# Patient Record
Sex: FEMALE | Race: BLACK OR AFRICAN AMERICAN | NOT HISPANIC OR LATINO | URBAN - METROPOLITAN AREA
[De-identification: names, ages, dates, MRNs, and addresses within clinical notes are randomized per-mention and may not be internally consistent; named-entity substitution may affect disease eponyms.]

---

## 2017-09-12 PROBLEM — Z00.00 ENCOUNTER FOR PREVENTIVE HEALTH EXAMINATION: Status: ACTIVE | Noted: 2017-09-12

## 2022-11-22 ENCOUNTER — EMERGENCY (EMERGENCY)
Facility: HOSPITAL | Age: 52
LOS: 1 days | Discharge: HOME | End: 2022-11-22
Attending: EMERGENCY MEDICINE | Admitting: EMERGENCY MEDICINE

## 2022-11-22 VITALS
RESPIRATION RATE: 18 BRPM | SYSTOLIC BLOOD PRESSURE: 176 MMHG | HEART RATE: 100 BPM | DIASTOLIC BLOOD PRESSURE: 93 MMHG | OXYGEN SATURATION: 99 % | TEMPERATURE: 99 F

## 2022-11-22 DIAGNOSIS — I10 ESSENTIAL (PRIMARY) HYPERTENSION: ICD-10-CM

## 2022-11-22 DIAGNOSIS — E78.49 OTHER HYPERLIPIDEMIA: ICD-10-CM

## 2022-11-22 DIAGNOSIS — K59.00 CONSTIPATION, UNSPECIFIED: ICD-10-CM

## 2022-11-22 DIAGNOSIS — E11.9 TYPE 2 DIABETES MELLITUS WITHOUT COMPLICATIONS: ICD-10-CM

## 2022-11-22 LAB
A1C WITH ESTIMATED AVERAGE GLUCOSE RESULT: 11.5 % — HIGH (ref 4–5.6)
ALBUMIN SERPL ELPH-MCNC: 4.4 G/DL — SIGNIFICANT CHANGE UP (ref 3.3–5)
ALP SERPL-CCNC: 132 U/L — HIGH (ref 40–120)
ALT FLD-CCNC: 16 U/L — SIGNIFICANT CHANGE UP (ref 4–33)
ANION GAP SERPL CALC-SCNC: 12 MMOL/L — SIGNIFICANT CHANGE UP (ref 7–14)
APPEARANCE UR: CLEAR — SIGNIFICANT CHANGE UP
AST SERPL-CCNC: 22 U/L — SIGNIFICANT CHANGE UP (ref 4–32)
BASE EXCESS BLDV CALC-SCNC: 1.5 MMOL/L — SIGNIFICANT CHANGE UP (ref -2–3)
BASOPHILS # BLD AUTO: 0.05 K/UL — SIGNIFICANT CHANGE UP (ref 0–0.2)
BASOPHILS NFR BLD AUTO: 0.7 % — SIGNIFICANT CHANGE UP (ref 0–2)
BILIRUB SERPL-MCNC: <0.2 MG/DL — SIGNIFICANT CHANGE UP (ref 0.2–1.2)
BILIRUB UR-MCNC: NEGATIVE — SIGNIFICANT CHANGE UP
BLOOD GAS VENOUS COMPREHENSIVE RESULT: SIGNIFICANT CHANGE UP
BUN SERPL-MCNC: 14 MG/DL — SIGNIFICANT CHANGE UP (ref 7–23)
CALCIUM SERPL-MCNC: 9.4 MG/DL — SIGNIFICANT CHANGE UP (ref 8.4–10.5)
CHLORIDE BLDV-SCNC: 97 MMOL/L — SIGNIFICANT CHANGE UP (ref 96–108)
CHLORIDE SERPL-SCNC: 95 MMOL/L — LOW (ref 98–107)
CO2 BLDV-SCNC: 29.2 MMOL/L — HIGH (ref 22–26)
CO2 SERPL-SCNC: 25 MMOL/L — SIGNIFICANT CHANGE UP (ref 22–31)
COLOR SPEC: SIGNIFICANT CHANGE UP
CREAT SERPL-MCNC: 0.72 MG/DL — SIGNIFICANT CHANGE UP (ref 0.5–1.3)
DIFF PNL FLD: NEGATIVE — SIGNIFICANT CHANGE UP
EGFR: 101 ML/MIN/1.73M2 — SIGNIFICANT CHANGE UP
EOSINOPHIL # BLD AUTO: 0.04 K/UL — SIGNIFICANT CHANGE UP (ref 0–0.5)
EOSINOPHIL NFR BLD AUTO: 0.6 % — SIGNIFICANT CHANGE UP (ref 0–6)
ESTIMATED AVERAGE GLUCOSE: 283 — SIGNIFICANT CHANGE UP
FLUAV AG NPH QL: SIGNIFICANT CHANGE UP
FLUBV AG NPH QL: SIGNIFICANT CHANGE UP
GAS PNL BLDV: 130 MMOL/L — LOW (ref 136–145)
GLUCOSE BLDV-MCNC: 213 MG/DL — HIGH (ref 70–99)
GLUCOSE SERPL-MCNC: 234 MG/DL — HIGH (ref 70–99)
GLUCOSE UR QL: ABNORMAL
HCO3 BLDV-SCNC: 28 MMOL/L — SIGNIFICANT CHANGE UP (ref 22–29)
HCT VFR BLD CALC: 38.2 % — SIGNIFICANT CHANGE UP (ref 34.5–45)
HCT VFR BLDA CALC: 38 % — SIGNIFICANT CHANGE UP (ref 34.5–46.5)
HGB BLD CALC-MCNC: 12.7 G/DL — SIGNIFICANT CHANGE UP (ref 11.5–15.5)
HGB BLD-MCNC: 12.4 G/DL — SIGNIFICANT CHANGE UP (ref 11.5–15.5)
IANC: 4.98 K/UL — SIGNIFICANT CHANGE UP (ref 1.8–7.4)
IMM GRANULOCYTES NFR BLD AUTO: 0.3 % — SIGNIFICANT CHANGE UP (ref 0–0.9)
KETONES UR-MCNC: NEGATIVE — SIGNIFICANT CHANGE UP
LACTATE BLDV-MCNC: 1 MMOL/L — SIGNIFICANT CHANGE UP (ref 0.5–2)
LEUKOCYTE ESTERASE UR-ACNC: NEGATIVE — SIGNIFICANT CHANGE UP
LYMPHOCYTES # BLD AUTO: 1.61 K/UL — SIGNIFICANT CHANGE UP (ref 1–3.3)
LYMPHOCYTES # BLD AUTO: 22.5 % — SIGNIFICANT CHANGE UP (ref 13–44)
MCHC RBC-ENTMCNC: 26.8 PG — LOW (ref 27–34)
MCHC RBC-ENTMCNC: 32.5 GM/DL — SIGNIFICANT CHANGE UP (ref 32–36)
MCV RBC AUTO: 82.5 FL — SIGNIFICANT CHANGE UP (ref 80–100)
MONOCYTES # BLD AUTO: 0.44 K/UL — SIGNIFICANT CHANGE UP (ref 0–0.9)
MONOCYTES NFR BLD AUTO: 6.2 % — SIGNIFICANT CHANGE UP (ref 2–14)
NEUTROPHILS # BLD AUTO: 4.98 K/UL — SIGNIFICANT CHANGE UP (ref 1.8–7.4)
NEUTROPHILS NFR BLD AUTO: 69.7 % — SIGNIFICANT CHANGE UP (ref 43–77)
NITRITE UR-MCNC: NEGATIVE — SIGNIFICANT CHANGE UP
NRBC # BLD: 0 /100 WBCS — SIGNIFICANT CHANGE UP (ref 0–0)
NRBC # FLD: 0 K/UL — SIGNIFICANT CHANGE UP (ref 0–0)
PCO2 BLDV: 49 MMHG — HIGH (ref 39–42)
PH BLDV: 7.36 — SIGNIFICANT CHANGE UP (ref 7.32–7.43)
PH UR: 7.5 — SIGNIFICANT CHANGE UP (ref 5–8)
PLATELET # BLD AUTO: 226 K/UL — SIGNIFICANT CHANGE UP (ref 150–400)
PO2 BLDV: 53 MMHG — SIGNIFICANT CHANGE UP
POTASSIUM BLDV-SCNC: 6.1 MMOL/L — HIGH (ref 3.5–5.1)
POTASSIUM SERPL-MCNC: 4.8 MMOL/L — SIGNIFICANT CHANGE UP (ref 3.5–5.3)
POTASSIUM SERPL-SCNC: 4.8 MMOL/L — SIGNIFICANT CHANGE UP (ref 3.5–5.3)
PROT SERPL-MCNC: 8 G/DL — SIGNIFICANT CHANGE UP (ref 6–8.3)
PROT UR-MCNC: NEGATIVE — SIGNIFICANT CHANGE UP
RBC # BLD: 4.63 M/UL — SIGNIFICANT CHANGE UP (ref 3.8–5.2)
RBC # FLD: 12.5 % — SIGNIFICANT CHANGE UP (ref 10.3–14.5)
RSV RNA NPH QL NAA+NON-PROBE: SIGNIFICANT CHANGE UP
SAO2 % BLDV: 84.5 % — SIGNIFICANT CHANGE UP
SARS-COV-2 RNA SPEC QL NAA+PROBE: SIGNIFICANT CHANGE UP
SODIUM SERPL-SCNC: 132 MMOL/L — LOW (ref 135–145)
SP GR SPEC: 1.01 — SIGNIFICANT CHANGE UP (ref 1.01–1.05)
UROBILINOGEN FLD QL: SIGNIFICANT CHANGE UP
WBC # BLD: 7.14 K/UL — SIGNIFICANT CHANGE UP (ref 3.8–10.5)
WBC # FLD AUTO: 7.14 K/UL — SIGNIFICANT CHANGE UP (ref 3.8–10.5)

## 2022-11-22 PROCEDURE — 71046 X-RAY EXAM CHEST 2 VIEWS: CPT | Mod: 26

## 2022-11-22 PROCEDURE — 99205 OFFICE O/P NEW HI 60 MIN: CPT

## 2022-11-22 PROCEDURE — 70450 CT HEAD/BRAIN W/O DYE: CPT | Mod: 26,MA

## 2022-11-22 PROCEDURE — 99220: CPT

## 2022-11-22 PROCEDURE — 93010 ELECTROCARDIOGRAM REPORT: CPT

## 2022-11-22 RX ORDER — SODIUM CHLORIDE 9 MG/ML
1000 INJECTION, SOLUTION INTRAVENOUS
Refills: 0 | Status: DISCONTINUED | OUTPATIENT
Start: 2022-11-22 | End: 2022-11-25

## 2022-11-22 RX ORDER — AMLODIPINE BESYLATE 2.5 MG/1
5 TABLET ORAL ONCE
Refills: 0 | Status: COMPLETED | OUTPATIENT
Start: 2022-11-22 | End: 2022-11-22

## 2022-11-22 RX ORDER — INSULIN LISPRO 100/ML
10 VIAL (ML) SUBCUTANEOUS
Refills: 0 | Status: DISCONTINUED | OUTPATIENT
Start: 2022-11-22 | End: 2022-11-22

## 2022-11-22 RX ORDER — GLUCAGON INJECTION, SOLUTION 0.5 MG/.1ML
1 INJECTION, SOLUTION SUBCUTANEOUS ONCE
Refills: 0 | Status: DISCONTINUED | OUTPATIENT
Start: 2022-11-22 | End: 2022-11-25

## 2022-11-22 RX ORDER — DEXTROSE 50 % IN WATER 50 %
15 SYRINGE (ML) INTRAVENOUS ONCE
Refills: 0 | Status: DISCONTINUED | OUTPATIENT
Start: 2022-11-22 | End: 2022-11-25

## 2022-11-22 RX ORDER — INSULIN GLARGINE 100 [IU]/ML
16 INJECTION, SOLUTION SUBCUTANEOUS
Refills: 0 | Status: DISCONTINUED | OUTPATIENT
Start: 2022-11-23 | End: 2022-11-23

## 2022-11-22 RX ORDER — DEXTROSE 50 % IN WATER 50 %
12.5 SYRINGE (ML) INTRAVENOUS ONCE
Refills: 0 | Status: DISCONTINUED | OUTPATIENT
Start: 2022-11-22 | End: 2022-11-25

## 2022-11-22 RX ORDER — INSULIN LISPRO 100/ML
VIAL (ML) SUBCUTANEOUS
Refills: 0 | Status: DISCONTINUED | OUTPATIENT
Start: 2022-11-22 | End: 2022-11-25

## 2022-11-22 RX ORDER — SODIUM CHLORIDE 9 MG/ML
1000 INJECTION, SOLUTION INTRAVENOUS ONCE
Refills: 0 | Status: COMPLETED | OUTPATIENT
Start: 2022-11-22 | End: 2022-11-22

## 2022-11-22 RX ORDER — DEXTROSE 50 % IN WATER 50 %
25 SYRINGE (ML) INTRAVENOUS ONCE
Refills: 0 | Status: DISCONTINUED | OUTPATIENT
Start: 2022-11-22 | End: 2022-11-25

## 2022-11-22 RX ORDER — INSULIN GLARGINE 100 [IU]/ML
16 INJECTION, SOLUTION SUBCUTANEOUS ONCE
Refills: 0 | Status: COMPLETED | OUTPATIENT
Start: 2022-11-22 | End: 2022-11-22

## 2022-11-22 RX ORDER — MECLIZINE HCL 12.5 MG
12.5 TABLET ORAL ONCE
Refills: 0 | Status: COMPLETED | OUTPATIENT
Start: 2022-11-22 | End: 2022-11-22

## 2022-11-22 RX ORDER — INSULIN LISPRO 100/ML
8 VIAL (ML) SUBCUTANEOUS
Refills: 0 | Status: DISCONTINUED | OUTPATIENT
Start: 2022-11-22 | End: 2022-11-23

## 2022-11-22 RX ADMIN — Medication 12.5 MILLIGRAM(S): at 10:09

## 2022-11-22 RX ADMIN — SODIUM CHLORIDE 1000 MILLILITER(S): 9 INJECTION, SOLUTION INTRAVENOUS at 10:09

## 2022-11-22 RX ADMIN — Medication 4: at 18:02

## 2022-11-22 RX ADMIN — Medication 8 UNIT(S): at 18:01

## 2022-11-22 RX ADMIN — AMLODIPINE BESYLATE 5 MILLIGRAM(S): 2.5 TABLET ORAL at 13:10

## 2022-11-22 RX ADMIN — INSULIN GLARGINE 16 UNIT(S): 100 INJECTION, SOLUTION SUBCUTANEOUS at 13:55

## 2022-11-22 NOTE — ED PROVIDER NOTE - ATTENDING CONTRIBUTION TO CARE
Pt was seen and evaluated by me. Pt is a 51 y/o female with PMHx of DM type 2 and HTN who presented to the ED for dizziness, cough, nausea, and vomiting X 1 day. Pt states having a cough with some sputum over the past 2 wks. Pt notes over the past day having some nausea with an episode of vomiting. Pt notes having dizziness, noted with position change. Pt states since having a headache. Pt denies any neck pain or back pain. Pt denies any fever, chills, SOB, chest pain, or abd pain. Pt admits to some constipation. Denies any dysuria.  VITALS: Vitals have been reviewed.  GEN APPEARANCE: Alert and cooperative, non-toxic appearing and in NAD  HEAD: Atraumatic, normocephalic.   EYES: PERRL, EOMI. No nystagmus.   EARS: Gross hearing intact. TMI b/l   NOSE: No nasal discharge.   THROAT: MMM. Oral cavity and pharynx normal.   CV: RRR, S1S2, no c/r/m/g. No cyanosis or pallor.   LUNGS: CTAB. No wheezing. No rales. No rhonchi. No diminished breath sounds.   ABDOMEN: Soft, NTND. No guarding or rebound.   MSK/EXT: Spine appears normal, no spine point tenderness.   NEURO: Alert, follows commands. Speech normal. Sensation and motor normal x4 extremities.   SKIN: Normal color for race, warm, dry and intact. No evidence of rash.  51 y/o female with PMHx of DM type 2 and HTN who presented to the ED for dizziness, cough, nausea, and vomiting X 1 day.  Concern for URI, Vertigo, Dehydration, ICB  Labs, EKG, CXR, CT

## 2022-11-22 NOTE — ED PROVIDER NOTE - OBJECTIVE STATEMENT
52-year-old with a past medical history of insulin-dependent diabetes senting with multiple chief complaints.  Of note, vital signs indicate a blood pressure of 176/93, heart rate of 100% afebrile in triage.  Patient has had 2 weeks of dry cough.  With positive sputum production.  Today at 5 AM, patient woke up and felt that the room was spinning when she mekhi to a seated position.  The patient sensation of dizziness went away promptly.  The sensation is usually not there when she is not moving her head it is worsened with head movement,  she has possibly been diagnosed with vertigo in the past but is unsure.  Associated associated is nausea and vomiting.  Headache started occurring today.  With gradual onset.  Is not on aspirin or Plavix.  Is in between PCPs.  Is not on any other medications.  Has recently also been unsteady on her feet.  She has not fallen nor lost consciousness.  No fevers at home. 52-year-old with a past medical history of insulin-dependent diabetes presenting with multiple chief complaints.  Of note, vital signs indicate a blood pressure of 176/93, pulse ox of 100% afebrile in triage.  Patient has had 2 weeks of dry cough.  With positive sputum production.  Today at 5 AM, patient woke up and felt that the room was spinning when she mekhi to a seated position.  The patient sensation of dizziness went away promptly.  The sensation is usually not there when she is not moving her head it is worsened with head movement,  she has possibly been diagnosed with vertigo in the past but is unsure.  Associated with nausea and vomiting.  Headache started occurring today.  With gradual onset.  Is not on aspirin or Plavix.  Is in between PCPs.  Is not on any other medications.  Has recently also been unsteady on her feet.  She has not fallen nor lost consciousness.  No fevers at home.

## 2022-11-22 NOTE — CONSULT NOTE ADULT - SUBJECTIVE AND OBJECTIVE BOX
HPI:    (Stroke only)  NIHSS:   MRS:   ICH:     REVIEW OF SYSTEMS  General:	  Skin/Breast:	  Ophthalmologic:  ENMT:	  Respiratory and Thorax:	  Cardiovascular:	  Gastrointestinal:	  Genitourinary:	  Musculoskeletal:	  Neurological:	  Psychiatric:	  Hematology/Lymphatics:	  Endocrine:	  Allergic/Immunologic:	    A 10-system ROS was performed and is negative except for those items noted above and/or in the HPI.    PAST MEDICAL & SURGICAL HISTORY:  DM2 (diabetes mellitus, type 2)      HTN (hypertension)      No significant past surgical history        FAMILY HISTORY:  No pertinent family history in first degree relatives      SOCIAL HISTORY:   T/E/D:   Occupation:   Lives with:     MEDICATIONS (HOME):  Home Medications:    MEDICATIONS  (STANDING):  dextrose 5%. 1000 milliLiter(s) (50 mL/Hr) IV Continuous <Continuous>  dextrose 5%. 1000 milliLiter(s) (100 mL/Hr) IV Continuous <Continuous>  dextrose 50% Injectable 25 Gram(s) IV Push once  dextrose 50% Injectable 12.5 Gram(s) IV Push once  dextrose 50% Injectable 25 Gram(s) IV Push once  glucagon  Injectable 1 milliGRAM(s) IntraMuscular once  insulin lispro (ADMELOG) corrective regimen sliding scale   SubCutaneous three times a day before meals  insulin lispro Injectable (ADMELOG) 8 Unit(s) SubCutaneous three times a day before meals    MEDICATIONS  (PRN):  dextrose Oral Gel 15 Gram(s) Oral once PRN Blood Glucose LESS THAN 70 milliGRAM(s)/deciliter    ALLERGIES/INTOLERANCES:  Allergies  No Known Allergies    Intolerances    VITALS & EXAMINATION:  Vital Signs Last 24 Hrs  T(C): 36.8 (2022 17:20), Max: 37.2 (2022 08:35)  T(F): 98.3 (2022 17:20), Max: 98.9 (2022 08:35)  HR: 102 (2022 17:20) (87 - 102)  BP: 122/71 (2022 17:20) (122/71 - 176/93)  BP(mean): --  RR: 17 (2022 17:20) (17 - 18)  SpO2: 99% (2022 17:20) (98% - 100%)    Parameters below as of 2022 17:20  Patient On (Oxygen Delivery Method): room air        General:  Constitutional: Obese Female, appears stated age, in no apparent distress including pain  Head: Normocephalic & atraumatic.  ENT: Patent ear canals, intact TM, mucus membranes moist & pink, neck supple, no lymphadenopathy.   Respiratory: Patent airway. All lung fields are clear to auscultation bilaterally.  Extremities: No cyanosis, clubbing, or edema.  Skin: No rashes, bruising, or discoloration.    Cardiovascular (>2): RRR no murmurs. Carotid pulsations symmetric, no bruits. Normal capillary beds refill, 1-2 seconds or less.     Neurological (>12):  MS: Awake, alert, oriented to person, place, situation, time. Normal affect. Follows all commands.    Language: Speech is clear, fluent with good repetition & comprehension (able to name objects___)    CNs: PERRLA (R = 3mm, L = 3mm). VFF. EOMI no nystagmus, no diplopia. V1-3 intact to LT/pinprick, well developed masseter muscles b/l. No facial asymmetry b/l, full eye closure strength b/l. Hearing grossly normal (rubbing fingers) b/l. Symmetric palate elevation in midline. Gag reflex deferred. Head turning & shoulder shrug intact b/l. Tongue midline, normal movements, no atrophy.    Fundoscopic: pale w/ sharp discs margins No vascular changes.      Motor: Normal muscle bulk & tone. No noticeable tremor or seizure. No pronator drift.              Deltoid	Biceps	Triceps	Wrist	Finger ABd	   R	5	5	5	5	5		5 	  L	5	5	5	5	5		5    	H-Flex	H-Ext	H-ABd	H-ADd	K-Flex	K-Ext	D-Flex	P-Flex  R	5	5	5	5	5	5	5	5 	   L	5	5	5	5	5	5	5	5	     Sensation: Intact to LT/PP/Temp/Vibration/Position b/l throughout.     Cortical: Extinction on DSS (neglect): none    Reflexes:              Biceps(C5)       BR(C6)     Triceps(C7)               Patellar(L4)    Achilles(S1)    Plantar Resp  R	2	          2	             2		        2		    2		Down   L	2	          2	             2		        2		    2		Down     Coordination: intact rapid-alt movements. No dysmetria to FTN/HTS    Gait: Normal Romberg. No postural instability. Normal stance and tandem gait.     LABORATORY:  CBC                       12.4   7.14  )-----------( 226      ( 2022 10:40 )             38.2     Chem 11-22    132<L>  |  95<L>  |  14  ----------------------------<  234<H>  4.8   |  25  |  0.72    Ca    9.4      2022 10:40    TPro  8.0  /  Alb  4.4  /  TBili  <0.2  /  DBili  x   /  AST  22  /  ALT  16  /  AlkPhos  132<H>  11-22    LFTs LIVER FUNCTIONS - ( 2022 10:40 )  Alb: 4.4 g/dL / Pro: 8.0 g/dL / ALK PHOS: 132 U/L / ALT: 16 U/L / AST: 22 U/L / GGT: x           Coagulopathy   Lipid Panel   A1c   Cardiac enzymes     U/A Urinalysis Basic - ( 2022 12:30 )    Color: Light Yellow / Appearance: Clear / S.010 / pH: x  Gluc: x / Ketone: Negative  / Bili: Negative / Urobili: <2 mg/dL   Blood: x / Protein: Negative / Nitrite: Negative   Leuk Esterase: Negative / RBC: x / WBC x   Sq Epi: x / Non Sq Epi: x / Bacteria: x      CSF  Immunological  Other    STUDIES & IMAGING:  Studies (EKG, EEG, EMG, etc):     Radiology (XR, CT, MR, U/S, TTE/KRIS):    CT head w/o: 1. Subtle decreased attenuation right external capsular region, may   represent an age-indeterminate ischemic event (axial 2-21 and coronal   4-36).  2. Ill-defined density involving the posterior right lobe, which may   represent retinal hemorrhage versus a tumor (as might be seen with   melanoma).  3. Air-fluid level involving the right frontal sinus with opacification   of anteriorly located right ethmoid air cells. Correlate clinically for   acute sinusitis.     HPI:  53 y/o female with PMHx of DM type 2 and HTN who presented to the ED for dizziness, cough, nausea, and vomiting X 1 day. Found to have a BS in the 200's in the ED with a HGA1C of 11 noncompliant with insulin regimen at home. Sent to CDU for endo consult and further education for DM.     Neurology consulted for CT head imaging showing 'decreased attenuation right external capsular region, may represent an age-indeterminate ischemic event.' Also showed concern for globe vitreous hemm vs mass. Denies...    (Stroke only)  NIHSS: 0   MRS: 0      REVIEW OF SYSTEMS  Per HPI    PAST MEDICAL & SURGICAL HISTORY:  DM2 (diabetes mellitus, type 2)      HTN (hypertension)      No significant past surgical history        FAMILY HISTORY:  No pertinent family history in first degree relatives      SOCIAL HISTORY:   T/E/D:   Occupation:   Lives with:     MEDICATIONS (HOME):  Home Medications:    MEDICATIONS  (STANDING):  dextrose 5%. 1000 milliLiter(s) (50 mL/Hr) IV Continuous <Continuous>  dextrose 5%. 1000 milliLiter(s) (100 mL/Hr) IV Continuous <Continuous>  dextrose 50% Injectable 25 Gram(s) IV Push once  dextrose 50% Injectable 12.5 Gram(s) IV Push once  dextrose 50% Injectable 25 Gram(s) IV Push once  glucagon  Injectable 1 milliGRAM(s) IntraMuscular once  insulin lispro (ADMELOG) corrective regimen sliding scale   SubCutaneous three times a day before meals  insulin lispro Injectable (ADMELOG) 8 Unit(s) SubCutaneous three times a day before meals    MEDICATIONS  (PRN):  dextrose Oral Gel 15 Gram(s) Oral once PRN Blood Glucose LESS THAN 70 milliGRAM(s)/deciliter    ALLERGIES/INTOLERANCES:  Allergies  No Known Allergies    Intolerances    VITALS & EXAMINATION:  Vital Signs Last 24 Hrs  T(C): 36.8 (2022 17:20), Max: 37.2 (2022 08:35)  T(F): 98.3 (2022 17:20), Max: 98.9 (2022 08:35)  HR: 102 (2022 17:20) (87 - 102)  BP: 122/71 (2022 17:20) (122/71 - 176/93)  BP(mean): --  RR: 17 (2022 17:20) (17 - 18)  SpO2: 99% (2022 17:20) (98% - 100%)    Parameters below as of 2022 17:20  Patient On (Oxygen Delivery Method): room air        General:  INCOMPLETE (TO BE UPDATED)  Constitutional: Female, appears stated age, in no apparent distress including pain  Head: Normocephalic & atraumatic.    Neurological (>12):  MS: Awake, alert, oriented to person, place, situation, time. Normal affect. Follows all commands.    Language: Speech is clear, fluent with good repetition & comprehension (able to name objects___)    CNs: PERRLA (R = 3mm, L = 3mm). VFF. EOMI no nystagmus, no diplopia. V1-3 intact to LT/pinprick, well developed masseter muscles b/l. No facial asymmetry b/l, full eye closure strength b/l. Hearing grossly normal (rubbing fingers) b/l. Symmetric palate elevation in midline. Gag reflex deferred. Head turning & shoulder shrug intact b/l. Tongue midline, normal movements, no atrophy.    Motor: Normal muscle bulk & tone. No noticeable tremor or seizure. No pronator drift.    Sensation: Intact to LT/PP/Temp/Vibration/Position b/l throughout.     Cortical: Extinction on DSS (neglect): none    Coordination: intact rapid-alt movements. No dysmetria to FTN/HTS    Gait: Normal Romberg. No postural instability. Normal stance and tandem gait.     LABORATORY:  CBC                       12.4   7.14  )-----------( 226      ( 2022 10:40 )             38.2     Chem 11-22    132<L>  |  95<L>  |  14  ----------------------------<  234<H>  4.8   |  25  |  0.72    Ca    9.4      2022 10:40    TPro  8.0  /  Alb  4.4  /  TBili  <0.2  /  DBili  x   /  AST  22  /  ALT  16  /  AlkPhos  132<H>  11-22    LFTs LIVER FUNCTIONS - ( 2022 10:40 )  Alb: 4.4 g/dL / Pro: 8.0 g/dL / ALK PHOS: 132 U/L / ALT: 16 U/L / AST: 22 U/L / GGT: x           Coagulopathy   Lipid Panel   A1c   Cardiac enzymes     U/A Urinalysis Basic - ( 2022 12:30 )    Color: Light Yellow / Appearance: Clear / S.010 / pH: x  Gluc: x / Ketone: Negative  / Bili: Negative / Urobili: <2 mg/dL   Blood: x / Protein: Negative / Nitrite: Negative   Leuk Esterase: Negative / RBC: x / WBC x   Sq Epi: x / Non Sq Epi: x / Bacteria: x      CSF  Immunological  Other    STUDIES & IMAGING:  Studies (EKG, EEG, EMG, etc):     Radiology (XR, CT, MR, U/S, TTE/KRIS):    CT head w/o: 1. Subtle decreased attenuation right external capsular region, may   represent an age-indeterminate ischemic event (axial 2-21 and coronal   4-36).  2. Ill-defined density involving the posterior right lobe, which may   represent retinal hemorrhage versus a tumor (as might be seen with   melanoma).  3. Air-fluid level involving the right frontal sinus with opacification   of anteriorly located right ethmoid air cells. Correlate clinically for   acute sinusitis.     HPI:  51 y/o female with PMHx of DM type 2 and HTN who presented to the ED for dizziness, cough, nausea, and vomiting X 1 day. Found to have a BS in the 200's in the ED with a HGA1C of 11 noncompliant with insulin regimen at home. Sent to CDU for endo consult and further education for DM.     Neurology consulted for CT head imaging showing 'decreased attenuation right external capsular region, may represent an age-indeterminate ischemic event.' Also showed concern for R globe vitreous hemm vs mass. Denies blurry vision, diplopia, speech changes, LOC, neck pain, focal numbness/tingling/paresis. Patient on and off takes ASA 81mg. No hx of infarcts in the past.     (Stroke only)  NIHSS: 0   MRS: 0      REVIEW OF SYSTEMS  Per HPI    PAST MEDICAL & SURGICAL HISTORY:  DM2 (diabetes mellitus, type 2)      HTN (hypertension)      No significant past surgical history        FAMILY HISTORY:  No pertinent family history in first degree relatives      SOCIAL HISTORY:   T/E/D:   Occupation:   Lives with:     MEDICATIONS (HOME):  Home Medications:    MEDICATIONS  (STANDING):  dextrose 5%. 1000 milliLiter(s) (50 mL/Hr) IV Continuous <Continuous>  dextrose 5%. 1000 milliLiter(s) (100 mL/Hr) IV Continuous <Continuous>  dextrose 50% Injectable 25 Gram(s) IV Push once  dextrose 50% Injectable 12.5 Gram(s) IV Push once  dextrose 50% Injectable 25 Gram(s) IV Push once  glucagon  Injectable 1 milliGRAM(s) IntraMuscular once  insulin lispro (ADMELOG) corrective regimen sliding scale   SubCutaneous three times a day before meals  insulin lispro Injectable (ADMELOG) 8 Unit(s) SubCutaneous three times a day before meals    MEDICATIONS  (PRN):  dextrose Oral Gel 15 Gram(s) Oral once PRN Blood Glucose LESS THAN 70 milliGRAM(s)/deciliter    ALLERGIES/INTOLERANCES:  Allergies  No Known Allergies    Intolerances    VITALS & EXAMINATION:  Vital Signs Last 24 Hrs  T(C): 36.8 (2022 17:20), Max: 37.2 (2022 08:35)  T(F): 98.3 (2022 17:20), Max: 98.9 (2022 08:35)  HR: 102 (2022 17:20) (87 - 102)  BP: 122/71 (2022 17:20) (122/71 - 176/93)  BP(mean): --  RR: 17 (2022 17:20) (17 - 18)  SpO2: 99% (2022 17:20) (98% - 100%)    Parameters below as of 2022 17:20  Patient On (Oxygen Delivery Method): room air        General:  INCOMPLETE (TO BE UPDATED)  Constitutional: Female, appears stated age, in no apparent distress including pain  Head: Normocephalic & atraumatic.    Neurological (>12):  MS: Awake, alert, oriented to person, place, situation, time. Normal affect. Follows all commands.    Language: Speech is clear, fluent with good repetition & comprehension (able to name objects___)    CNs: PERRLA (R = 3mm, L = 3mm). VFF. EOMI no nystagmus, no diplopia. V1-3 intact to LT/pinprick, well developed masseter muscles b/l. No facial asymmetry b/l, full eye closure strength b/l. Hearing grossly normal (rubbing fingers) b/l. Symmetric palate elevation in midline. Gag reflex deferred. Head turning & shoulder shrug intact b/l. Tongue midline, normal movements, no atrophy.    Motor: Normal muscle bulk & tone. No noticeable tremor or seizure. No pronator drift.    Sensation: Intact to LT/PP/Temp/Vibration/Position b/l throughout.     Cortical: Extinction on DSS (neglect): none    Coordination: intact rapid-alt movements. No dysmetria to FTN/HTS    Gait: Normal Romberg. No postural instability. Normal stance and tandem gait.     LABORATORY:  CBC                       12.4   7.14  )-----------( 226      ( 2022 10:40 )             38.2     Chem 11-22    132<L>  |  95<L>  |  14  ----------------------------<  234<H>  4.8   |  25  |  0.72    Ca    9.4      2022 10:40    TPro  8.0  /  Alb  4.4  /  TBili  <0.2  /  DBili  x   /  AST  22  /  ALT  16  /  AlkPhos  132<H>  11-22    LFTs LIVER FUNCTIONS - ( 2022 10:40 )  Alb: 4.4 g/dL / Pro: 8.0 g/dL / ALK PHOS: 132 U/L / ALT: 16 U/L / AST: 22 U/L / GGT: x           Coagulopathy   Lipid Panel   A1c   Cardiac enzymes     U/A Urinalysis Basic - ( 2022 12:30 )    Color: Light Yellow / Appearance: Clear / S.010 / pH: x  Gluc: x / Ketone: Negative  / Bili: Negative / Urobili: <2 mg/dL   Blood: x / Protein: Negative / Nitrite: Negative   Leuk Esterase: Negative / RBC: x / WBC x   Sq Epi: x / Non Sq Epi: x / Bacteria: x      CSF  Immunological  Other    STUDIES & IMAGING:  Studies (EKG, EEG, EMG, etc):     Radiology (XR, CT, MR, U/S, TTE/KRIS):    CT head w/o: 1. Subtle decreased attenuation right external capsular region, may   represent an age-indeterminate ischemic event (axial 2-21 and coronal   4-36).  2. Ill-defined density involving the posterior right lobe, which may   represent retinal hemorrhage versus a tumor (as might be seen with   melanoma).  3. Air-fluid level involving the right frontal sinus with opacification   of anteriorly located right ethmoid air cells. Correlate clinically for   acute sinusitis.     HPI:  51 y/o female with PMHx of DM type 2 and HTN who presented to the ED for dizziness, cough, nausea, and vomiting X 1 day. Found to have a BS in the 200's in the ED with a HGA1C of 11 noncompliant with insulin regimen at home. Sent to [facility]U for endo consult and further education for DM.     Neurology consulted for CT head imaging showing 'decreased attenuation right external capsular region, may represent an age-indeterminate ischemic event.' Also showed concern for R globe vitreous hemm vs mass. Denies blurry vision, diplopia, speech changes, LOC, neck pain, focal numbness/tingling/paresis. Patient on and off takes ASA 81mg. No hx of infarcts in the past.     (Stroke only)  NIHSS: 0   MRS: 0      REVIEW OF SYSTEMS  Per HPI    PAST MEDICAL & SURGICAL HISTORY:  DM2 (diabetes mellitus, type 2)      HTN (hypertension)      No significant past surgical history        FAMILY HISTORY:  No pertinent family history in first degree relatives      SOCIAL HISTORY:   T/E/D:   Occupation:   Lives with:     MEDICATIONS (HOME):  Home Medications:    MEDICATIONS  (STANDING):  dextrose 5%. 1000 milliLiter(s) (50 mL/Hr) IV Continuous <Continuous>  dextrose 5%. 1000 milliLiter(s) (100 mL/Hr) IV Continuous <Continuous>  dextrose 50% Injectable 25 Gram(s) IV Push once  dextrose 50% Injectable 12.5 Gram(s) IV Push once  dextrose 50% Injectable 25 Gram(s) IV Push once  glucagon  Injectable 1 milliGRAM(s) IntraMuscular once  insulin lispro (ADMELOG) corrective regimen sliding scale   SubCutaneous three times a day before meals  insulin lispro Injectable (ADMELOG) 8 Unit(s) SubCutaneous three times a day before meals    MEDICATIONS  (PRN):  dextrose Oral Gel 15 Gram(s) Oral once PRN Blood Glucose LESS THAN 70 milliGRAM(s)/deciliter    ALLERGIES/INTOLERANCES:  Allergies  No Known Allergies    Intolerances    VITALS & EXAMINATION:  Vital Signs Last 24 Hrs  T(C): 36.8 (2022 17:20), Max: 37.2 (2022 08:35)  T(F): 98.3 (2022 17:20), Max: 98.9 (2022 08:35)  HR: 102 (2022 17:20) (87 - 102)  BP: 122/71 (2022 17:20) (122/71 - 176/93)  BP(mean): --  RR: 17 (2022 17:20) (17 - 18)  SpO2: 99% (2022 17:20) (98% - 100%)    Parameters below as of 2022 17:20  Patient On (Oxygen Delivery Method): room air        General:  Constitutional: Female, appears stated age, in no apparent distress including pain  Head: Normocephalic & atraumatic.    Neurological (>12):  MS: Awake, alert, oriented to person, place, situation, time. Normal affect. Follows all commands.    Language: Speech is clear, fluent with good repetition & comprehension    CNs: PERRLA (R = 3mm, L = 3mm). VFF. EOMI no nystagmus V1-3 intact to LT. No facial asymmetry b/l, full eye closure strength b/l.Gag reflex deferred. Head turning & shoulder shrug intact b/l. Tongue midline, normal movements, no atrophy.    Motor: Normal muscle bulk & tone. No noticeable tremor or seizure. No pronator drift. No drift in LE's.     Sensation: Intact to LT b/l throughout.     Cortical: Extinction on DSS (neglect): none    Coordination: intact rapid-alt movements. No dysmetria to FTN/HTS    Gait: Deferred    LABORATORY:  CBC                       12.4   7.14  )-----------( 226      ( 2022 10:40 )             38.2     Chem 11-22    132<L>  |  95<L>  |  14  ----------------------------<  234<H>  4.8   |  25  |  0.72    Ca    9.4      2022 10:40    TPro  8.0  /  Alb  4.4  /  TBili  <0.2  /  DBili  x   /  AST  22  /  ALT  16  /  AlkPhos  132<H>  11-22    LFTs LIVER FUNCTIONS - ( 2022 10:40 )  Alb: 4.4 g/dL / Pro: 8.0 g/dL / ALK PHOS: 132 U/L / ALT: 16 U/L / AST: 22 U/L / GGT: x           Coagulopathy   Lipid Panel   A1c   Cardiac enzymes     U/A Urinalysis Basic - ( 2022 12:30 )    Color: Light Yellow / Appearance: Clear / S.010 / pH: x  Gluc: x / Ketone: Negative  / Bili: Negative / Urobili: <2 mg/dL   Blood: x / Protein: Negative / Nitrite: Negative   Leuk Esterase: Negative / RBC: x / WBC x   Sq Epi: x / Non Sq Epi: x / Bacteria: x      CSF  Immunological  Other    STUDIES & IMAGING:  Studies (EKG, EEG, EMG, etc):     Radiology (XR, CT, MR, U/S, TTE/KRIS):    CT head w/o: 1. Subtle decreased attenuation right external capsular region, may   represent an age-indeterminate ischemic event (axial 2-21 and coronal   4-36).  2. Ill-defined density involving the posterior right lobe, which may   represent retinal hemorrhage versus a tumor (as might be seen with   melanoma).  3. Air-fluid level involving the right frontal sinus with opacification   of anteriorly located right ethmoid air cells. Correlate clinically for   acute sinusitis.     HPI:  51 y/o female with PMHx of DM type 2 and HTN who presented to the ED for dizziness, cough, nausea, and vomiting X 1 day. Found to have a BS in the 200's in the ED with a HGA1C of 11 noncompliant with insulin regimen at home. Sent to CDU for endo consult and further education for DM.     Neurology consulted for CT head imaging showing 'decreased attenuation right external capsular region, may represent an age-indeterminate ischemic event.' Also showed concern for R globe vitreous hemm vs mass. Denies blurry vision, diplopia, speech changes, LOC, neck pain, focal numbness/tingling/paresis. Patient on and off takes ASA 81mg. No hx of infarcts in the past.     (Stroke only)  NIHSS: 0   MRS: 0      REVIEW OF SYSTEMS  Per HPI    PAST MEDICAL & SURGICAL HISTORY:  DM2 (diabetes mellitus, type 2)      HTN (hypertension)      No significant past surgical history        FAMILY HISTORY:  No pertinent family history in first degree relatives      SOCIAL HISTORY:   T/E/D:   Occupation:   Lives with:     MEDICATIONS (HOME):  Home Medications:    MEDICATIONS  (STANDING):  dextrose 5%. 1000 milliLiter(s) (50 mL/Hr) IV Continuous <Continuous>  dextrose 5%. 1000 milliLiter(s) (100 mL/Hr) IV Continuous <Continuous>  dextrose 50% Injectable 25 Gram(s) IV Push once  dextrose 50% Injectable 12.5 Gram(s) IV Push once  dextrose 50% Injectable 25 Gram(s) IV Push once  glucagon  Injectable 1 milliGRAM(s) IntraMuscular once  insulin lispro (ADMELOG) corrective regimen sliding scale   SubCutaneous three times a day before meals  insulin lispro Injectable (ADMELOG) 8 Unit(s) SubCutaneous three times a day before meals    MEDICATIONS  (PRN):  dextrose Oral Gel 15 Gram(s) Oral once PRN Blood Glucose LESS THAN 70 milliGRAM(s)/deciliter    ALLERGIES/INTOLERANCES:  Allergies  No Known Allergies    Intolerances    VITALS & EXAMINATION:  Vital Signs Last 24 Hrs  T(C): 36.8 (2022 17:20), Max: 37.2 (2022 08:35)  T(F): 98.3 (2022 17:20), Max: 98.9 (2022 08:35)  HR: 102 (2022 17:20) (87 - 102)  BP: 122/71 (2022 17:20) (122/71 - 176/93)  BP(mean): --  RR: 17 (2022 17:20) (17 - 18)  SpO2: 99% (2022 17:20) (98% - 100%)    Parameters below as of 2022 17:20  Patient On (Oxygen Delivery Method): room air        General:  Constitutional: Female, appears stated age, in no apparent distress including pain  Head: Normocephalic & atraumatic.    Neurological (>12):  MS: Awake, alert, oriented to person, place, situation, time. Normal affect. Follows all commands.    Language: Speech is clear, fluent with good repetition & comprehension    CNs: PERRLA (R = 3mm, L = 3mm). EOMI no nystagmus V1-3 intact to LT. No facial asymmetry b/l, full eye closure strength b/l.Gag reflex deferred. Head turning & shoulder shrug intact b/l. Tongue midline, normal movements, no atrophy.    Motor: Normal muscle bulk & tone. No noticeable tremor or seizure. No pronator drift. No drift in LE's.     Sensation: Intact to LT b/l throughout.     Cortical: Extinction on DSS (neglect): none    Coordination: intact rapid-alt movements. No dysmetria to FTN/HTS    Gait: Deferred    LABORATORY:  CBC                       12.4   7.14  )-----------( 226      ( 2022 10:40 )             38.2     Chem 11-22    132<L>  |  95<L>  |  14  ----------------------------<  234<H>  4.8   |  25  |  0.72    Ca    9.4      2022 10:40    TPro  8.0  /  Alb  4.4  /  TBili  <0.2  /  DBili  x   /  AST  22  /  ALT  16  /  AlkPhos  132<H>  11-22    LFTs LIVER FUNCTIONS - ( 2022 10:40 )  Alb: 4.4 g/dL / Pro: 8.0 g/dL / ALK PHOS: 132 U/L / ALT: 16 U/L / AST: 22 U/L / GGT: x           Coagulopathy   Lipid Panel   A1c   Cardiac enzymes     U/A Urinalysis Basic - ( 2022 12:30 )    Color: Light Yellow / Appearance: Clear / S.010 / pH: x  Gluc: x / Ketone: Negative  / Bili: Negative / Urobili: <2 mg/dL   Blood: x / Protein: Negative / Nitrite: Negative   Leuk Esterase: Negative / RBC: x / WBC x   Sq Epi: x / Non Sq Epi: x / Bacteria: x      CSF  Immunological  Other    STUDIES & IMAGING:  Studies (EKG, EEG, EMG, etc):     Radiology (XR, CT, MR, U/S, TTE/KRIS):    CT head w/o: 1. Subtle decreased attenuation right external capsular region, may   represent an age-indeterminate ischemic event (axial 2-21 and coronal   4-36).  2. Ill-defined density involving the posterior right lobe, which may   represent retinal hemorrhage versus a tumor (as might be seen with   melanoma).  3. Air-fluid level involving the right frontal sinus with opacification   of anteriorly located right ethmoid air cells. Correlate clinically for   acute sinusitis.     HPI:  51 y/o female with PMHx of DM type 2 and HTN who presented to the ED for dizziness, cough, nausea, and vomiting X 1 day. Found to have a BS in the 200's in the ED with a HGA1C of 11 noncompliant with insulin regimen at home. Sent to CDU for endo consult and further education for DM.     Neurology consulted for CT head imaging showing 'decreased attenuation right external capsular region, may represent an age-indeterminate ischemic event.' Also showed concern for R globe vitreous hemm vs mass. Denies diplopia, speech changes, LOC, neck pain, focal numbness/tingling/paresis. Patient on and off takes ASA 81mg. No hx of infarcts in the past.     (Stroke only)  NIHSS: 0   MRS: 0      REVIEW OF SYSTEMS  Per HPI    PAST MEDICAL & SURGICAL HISTORY:  DM2 (diabetes mellitus, type 2)      HTN (hypertension)      No significant past surgical history        FAMILY HISTORY:  No pertinent family history in first degree relatives      SOCIAL HISTORY:   T/E/D:   Occupation:   Lives with:     MEDICATIONS (HOME):  Home Medications:    MEDICATIONS  (STANDING):  dextrose 5%. 1000 milliLiter(s) (50 mL/Hr) IV Continuous <Continuous>  dextrose 5%. 1000 milliLiter(s) (100 mL/Hr) IV Continuous <Continuous>  dextrose 50% Injectable 25 Gram(s) IV Push once  dextrose 50% Injectable 12.5 Gram(s) IV Push once  dextrose 50% Injectable 25 Gram(s) IV Push once  glucagon  Injectable 1 milliGRAM(s) IntraMuscular once  insulin lispro (ADMELOG) corrective regimen sliding scale   SubCutaneous three times a day before meals  insulin lispro Injectable (ADMELOG) 8 Unit(s) SubCutaneous three times a day before meals    MEDICATIONS  (PRN):  dextrose Oral Gel 15 Gram(s) Oral once PRN Blood Glucose LESS THAN 70 milliGRAM(s)/deciliter    ALLERGIES/INTOLERANCES:  Allergies  No Known Allergies    Intolerances    VITALS & EXAMINATION:  Vital Signs Last 24 Hrs  T(C): 36.8 (2022 17:20), Max: 37.2 (2022 08:35)  T(F): 98.3 (2022 17:20), Max: 98.9 (2022 08:35)  HR: 102 (2022 17:20) (87 - 102)  BP: 122/71 (2022 17:20) (122/71 - 176/93)  BP(mean): --  RR: 17 (2022 17:20) (17 - 18)  SpO2: 99% (2022 17:20) (98% - 100%)    Parameters below as of 2022 17:20  Patient On (Oxygen Delivery Method): room air        General:  Constitutional: Female, appears stated age, in no apparent distress including pain  Head: Normocephalic & atraumatic.    Neurological (>12):  MS: Awake, alert, oriented to person, place, situation, time. Normal affect. Follows all commands.    Language: Speech is clear, fluent with good repetition & comprehension    CNs: PERRLA (R = 3mm, L = 3mm). EOMI no nystagmus V1-3 intact to LT. No facial asymmetry b/l, full eye closure strength b/l.Gag reflex deferred. Head turning & shoulder shrug intact b/l. Tongue midline, normal movements, no atrophy.    Motor: Normal muscle bulk & tone. No noticeable tremor or seizure. No pronator drift. No drift in LE's.     Sensation: Intact to LT b/l throughout.     Cortical: Extinction on DSS (neglect): none    Coordination: intact rapid-alt movements. No dysmetria to FTN/HTS    Gait: Deferred    LABORATORY:  CBC                       12.4   7.14  )-----------( 226      ( 2022 10:40 )             38.2     Chem 11-22    132<L>  |  95<L>  |  14  ----------------------------<  234<H>  4.8   |  25  |  0.72    Ca    9.4      2022 10:40    TPro  8.0  /  Alb  4.4  /  TBili  <0.2  /  DBili  x   /  AST  22  /  ALT  16  /  AlkPhos  132<H>  11-22    LFTs LIVER FUNCTIONS - ( 2022 10:40 )  Alb: 4.4 g/dL / Pro: 8.0 g/dL / ALK PHOS: 132 U/L / ALT: 16 U/L / AST: 22 U/L / GGT: x           Coagulopathy   Lipid Panel   A1c   Cardiac enzymes     U/A Urinalysis Basic - ( 2022 12:30 )    Color: Light Yellow / Appearance: Clear / S.010 / pH: x  Gluc: x / Ketone: Negative  / Bili: Negative / Urobili: <2 mg/dL   Blood: x / Protein: Negative / Nitrite: Negative   Leuk Esterase: Negative / RBC: x / WBC x   Sq Epi: x / Non Sq Epi: x / Bacteria: x      CSF  Immunological  Other    STUDIES & IMAGING:  Studies (EKG, EEG, EMG, etc):     Radiology (XR, CT, MR, U/S, TTE/KRIS):    CT head w/o: 1. Subtle decreased attenuation right external capsular region, may   represent an age-indeterminate ischemic event (axial 2-21 and coronal   4-36).  2. Ill-defined density involving the posterior right lobe, which may   represent retinal hemorrhage versus a tumor (as might be seen with   melanoma).  3. Air-fluid level involving the right frontal sinus with opacification   of anteriorly located right ethmoid air cells. Correlate clinically for   acute sinusitis.     HPI:  53 y/o female with PMHx of DM type 2 and HTN who presented to the ED for dizziness, cough, nausea, and vomiting X 1 day. Found to have a BS in the 200's in the ED with a HGA1C of 11 noncompliant with insulin regimen at home. Sent to CDU for endo consult and further education for DM.     Neurology consulted for CT head imaging showing 'decreased attenuation right external capsular region, may represent an age-indeterminate ischemic event.' Also showed concern for R globe vitreous hemm vs mass. Denies diplopia, speech changes, LOC, neck pain, focal numbness/tingling/paresis. Patient on and off takes ASA 81mg. No hx of infarcts in the past.     (Stroke only)  NIHSS: 1   MRS: 0      REVIEW OF SYSTEMS  Per HPI    PAST MEDICAL & SURGICAL HISTORY:  DM2 (diabetes mellitus, type 2)      HTN (hypertension)      No significant past surgical history        FAMILY HISTORY:  No pertinent family history in first degree relatives      SOCIAL HISTORY:   T/E/D:   Occupation:   Lives with:     MEDICATIONS (HOME):  Home Medications:    MEDICATIONS  (STANDING):  dextrose 5%. 1000 milliLiter(s) (50 mL/Hr) IV Continuous <Continuous>  dextrose 5%. 1000 milliLiter(s) (100 mL/Hr) IV Continuous <Continuous>  dextrose 50% Injectable 25 Gram(s) IV Push once  dextrose 50% Injectable 12.5 Gram(s) IV Push once  dextrose 50% Injectable 25 Gram(s) IV Push once  glucagon  Injectable 1 milliGRAM(s) IntraMuscular once  insulin lispro (ADMELOG) corrective regimen sliding scale   SubCutaneous three times a day before meals  insulin lispro Injectable (ADMELOG) 8 Unit(s) SubCutaneous three times a day before meals    MEDICATIONS  (PRN):  dextrose Oral Gel 15 Gram(s) Oral once PRN Blood Glucose LESS THAN 70 milliGRAM(s)/deciliter    ALLERGIES/INTOLERANCES:  Allergies  No Known Allergies    Intolerances    VITALS & EXAMINATION:  Vital Signs Last 24 Hrs  T(C): 36.8 (2022 17:20), Max: 37.2 (2022 08:35)  T(F): 98.3 (2022 17:20), Max: 98.9 (2022 08:35)  HR: 102 (2022 17:20) (87 - 102)  BP: 122/71 (2022 17:20) (122/71 - 176/93)  BP(mean): --  RR: 17 (2022 17:20) (17 - 18)  SpO2: 99% (2022 17:20) (98% - 100%)    Parameters below as of 2022 17:20  Patient On (Oxygen Delivery Method): room air        General:  Constitutional: Female, appears stated age, in no apparent distress including pain  Head: Normocephalic & atraumatic.    Neurological (>12):  MS: Awake, alert, oriented to person, place, situation, time. Normal affect. Follows all commands.    Language: Speech is clear, fluent with good repetition & comprehension    CNs: PERRLA (R = 3mm, L = 3mm). EOMI no nystagmus V1-3 intact to LT. No facial asymmetry b/l, full eye closure strength b/l.Gag reflex deferred. Head turning & shoulder shrug intact b/l. Tongue midline, normal movements, no atrophy.    Motor: Normal muscle bulk & tone. No noticeable tremor or seizure. No pronator drift. No drift in LE's.     Sensation: Intact to LT b/l throughout.     Cortical: Extinction on DSS (neglect): none    Coordination: intact rapid-alt movements. No dysmetria to FTN/HTS    Gait: Deferred    LABORATORY:  CBC                       12.4   7.14  )-----------( 226      ( 2022 10:40 )             38.2     Chem 11-22    132<L>  |  95<L>  |  14  ----------------------------<  234<H>  4.8   |  25  |  0.72    Ca    9.4      2022 10:40    TPro  8.0  /  Alb  4.4  /  TBili  <0.2  /  DBili  x   /  AST  22  /  ALT  16  /  AlkPhos  132<H>  11-22    LFTs LIVER FUNCTIONS - ( 2022 10:40 )  Alb: 4.4 g/dL / Pro: 8.0 g/dL / ALK PHOS: 132 U/L / ALT: 16 U/L / AST: 22 U/L / GGT: x           Coagulopathy   Lipid Panel   A1c   Cardiac enzymes     U/A Urinalysis Basic - ( 2022 12:30 )    Color: Light Yellow / Appearance: Clear / S.010 / pH: x  Gluc: x / Ketone: Negative  / Bili: Negative / Urobili: <2 mg/dL   Blood: x / Protein: Negative / Nitrite: Negative   Leuk Esterase: Negative / RBC: x / WBC x   Sq Epi: x / Non Sq Epi: x / Bacteria: x      CSF  Immunological  Other    STUDIES & IMAGING:  Studies (EKG, EEG, EMG, etc):     Radiology (XR, CT, MR, U/S, TTE/KRIS):    CT head w/o: 1. Subtle decreased attenuation right external capsular region, may   represent an age-indeterminate ischemic event (axial 2-21 and coronal   4-36).  2. Ill-defined density involving the posterior right lobe, which may   represent retinal hemorrhage versus a tumor (as might be seen with   melanoma).  3. Air-fluid level involving the right frontal sinus with opacification   of anteriorly located right ethmoid air cells. Correlate clinically for   acute sinusitis.

## 2022-11-22 NOTE — ED PROVIDER NOTE - NS ED ROS FT
Constitutional: (-) chills, (-) lethargy  ENMT: (-) nasal discharge, (-) neck pain or stiffness  Cardiac: (-) chest pain   Respiratory:  (+) dry cough  GI:  (+) nausea (+) vomiting (-) diarrhea (-) abdominal pain.  :  (-) dysuria   Neuro: (-) numbness (-) tingling (-) focal weakness  Skin:  (-) rash  Except as documented in the HPI,  all other systems are negative

## 2022-11-22 NOTE — ED ADULT TRIAGE NOTE - CHIEF COMPLAINT QUOTE
Pt brought in by EMS from home complaining of nausea, vomiting and headache. Pt denies chest pain, sob, fever or chills.

## 2022-11-22 NOTE — ED CDU PROVIDER INITIAL DAY NOTE - OBJECTIVE STATEMENT
53 y/o female with PMHx of DM type 2 and HTN who presented to the ED for dizziness, cough, nausea, and vomiting X 1 day. Pt states having a cough with some sputum over the past 2 wks. Pt notes over the past day having some nausea with an episode of vomiting. Pt notes having dizziness, noted with position change. Pt states since having a headache. Pt denies any neck pain or back pain. Pt denies any fever, chills, SOB, chest pain, or abd pain. Pt admits to some constipation. Denies any dysuria. Sent to OBS for medical management. 53 y/o female with PMHx of DM type 2 and HTN who presented to the ED for dizziness, cough, nausea, and vomiting X 1 day. Pt states having a cough with some sputum over the past 2 wks. Pt notes over the past day having some nausea with an episode of vomiting. Pt notes having dizziness, noted with position change. Pt states since having a headache. Pt denies any neck pain or back pain. Pt denies any fever, chills, SOB, chest pain, or abd pain. Pt admits to some constipation. Denies any dysuria.     CDU PA Sofie-   HPI as above. Patient found to have a BS in the 200's in the ED with a HGA1C of 11 noncompliant with insulin regimen at home. Does not have an Endocrinologist.   Patient also noted to have elevated BP not on BP meds at home. Sent to CDU for Endo consult, DM education and to start antihypertensive medications- norvasc suggested by ED team. 51 y/o female with PMHx of DM type 2 and HTN who presented to the ED for dizziness, cough, nausea, and vomiting X 1 day. Pt states having a cough with some sputum over the past 2 wks. Pt notes over the past day having some nausea with an episode of vomiting. Pt notes having dizziness, noted with position change. Pt states since having a headache. Pt denies any neck pain or back pain. Pt denies any fever, chills, SOB, chest pain, or abd pain. Pt admits to some constipation. Denies any dysuria.     CDU LUIS Sullivanloricruzito-   51 y/o F presenting to ED with multiple complaints including cough, nausea, vomiting, dizziness found to have a BS in the 200's in the ED with a HGA1C of 11 noncompliant with insulin regimen at home. States she takes "insulin 10 units before meals 3x/day and another insulin at night 16 units" Cannot recall names. Does not have an Endocrinologist.   Patient also noted to have elevated BP, not on BP meds at home. Sent to CDU for Endo consult, DM education and to start antihypertensive medications- Norvasc given in ED.  PMD- Dr. Santoyo  Plan: Endo consult, diabetes education, glucose monitoring, BP monitoring, Endo outpt follow up

## 2022-11-22 NOTE — CONSULT NOTE ADULT - NS ATTEND AMEND GEN_ALL_CORE FT
DM2 uncontrolled with hyperglycemia HbA1c 11.5%.  Missing basal insulin at night, will change timing to noon per patient preference. Take rapid acting insulin before not after meals.  Will need outpatient endocrine follow up with provider in [facility].  Endocrine team consulted for uncontrolled diabetes. Patient is high risk with high level decision making due to uncontrolled diabetes which places patient at high risk for cardiovascular and cerebrovascular events. Patient with lability of glucose requiring close monitoring and insulin adjustments.    Leland Terry MD  Division of Endocrinology  Pager: [phone]    If after 6PM or before 9AM, or on weekends/holidays, please call endocrine answering service for assistance ([phone]).  For nonurgent matters email [email or web address] for assistance.

## 2022-11-22 NOTE — CONSULT NOTE ADULT - SUBJECTIVE AND OBJECTIVE BOX
HPI: 52 yr old female with history of htn, uncontrolled dm2 arrives with to ED for dizziness, cough, nausea, and vomiting X 1 day. Pt states having a cough with some sputum over the past 2 wks. Has had DM2 for 15 years.  Initially took oral meds, then changed to insulin in last 8 years. Patient reports being nonadherent to basal bolus regimen at home.  Patient is on a long acting insulin, 16 units qhs but admits missing dose often.  Reports on novolog 10 units and takes this after meals.  States rarely missing any doses.  Patient is managed by PCP who has changed recently and also insurance has changed recently.  Patient used to have a freestyle chas but reports with insurance change, unsure if it is covered.  DOes not check FS at home.  In the past year, pt reports being hospitalized at [facility] for osteomyelitis, refused amputation and was dc'd on iv antibiotics x 3 months.  Has had hypoglycemia episodes 2-3 x in life and drank juice to feel better. 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  Left eye with intermittent blurry vision.  Diet consists of eggs, rice and beans, proteins like chicken or beef.  Only eats broccolui for vegetables, otherwise its mostly protein and vegetables.  Eats crackers with cheese for snacks.      PAST MEDICAL & SURGICAL HISTORY:  DM2 (diabetes mellitus, type 2)      HTN (hypertension)      No significant past surgical history          FAMILY HISTORY:  mother - HTN  father- DM        Social History: does not drink etoh, smoke cogaretes or use drugs, lives with mother    Outpatient Medications: see above    MEDICATIONS  (STANDING):  dextrose 5%. 1000 milliLiter(s) (50 mL/Hr) IV Continuous <Continuous>  dextrose 5%. 1000 milliLiter(s) (100 mL/Hr) IV Continuous <Continuous>  dextrose 50% Injectable 25 Gram(s) IV Push once  dextrose 50% Injectable 12.5 Gram(s) IV Push once  dextrose 50% Injectable 25 Gram(s) IV Push once  glucagon  Injectable 1 milliGRAM(s) IntraMuscular once  insulin lispro (ADMELOG) corrective regimen sliding scale   SubCutaneous three times a day before meals  insulin lispro Injectable (ADMELOG) 8 Unit(s) SubCutaneous three times a day before meals    MEDICATIONS  (PRN):  dextrose Oral Gel 15 Gram(s) Oral once PRN Blood Glucose LESS THAN 70 milliGRAM(s)/deciliter      Allergies    No Known Allergies    Intolerances      Review of Systems:  Constitutional: No fever  Eyes: + blurry vision  Neuro: No tremors  HEENT: No pain  Cardiovascular: No chest pain, palpitations  Respiratory: No SOB, + cough  GI: No nausea, vomiting, abdominal pain  : No dysuria  Skin: no rash  Psych: no depression  Endocrine: no polyuria, polydipsia  Hem/lymph: no swelling  Osteoporosis: no fractures    ALL OTHER SYSTEMS REVIEWED AND NEGATIVE        PHYSICAL EXAM:  VITALS: T(C): 36.8 (11-22-22 @ 15:43)  T(F): 98.3 (11-22-22 @ 15:43), Max: 98.9 (11-22-22 @ 08:35)  HR: 87 (11-22-22 @ 15:43) (87 - 100)  BP: 122/78 (11-22-22 @ 15:43) (122/78 - 176/93)  RR:  (17 - 18)  SpO2:  (98% - 100%)  Wt(kg): --  GENERAL: NAD, well-groomed, well-developed  EYES: No proptosis, no lid lag, anicteric  HEENT:  Atraumatic, Normocephalic, moist mucous membranes  THYROID: Normal size, no palpable nodules  RESPIRATORY: Mild wheeze b/l  CARDIOVASCULAR: Regular rate and rhythm; No murmurs; no peripheral edema  GI: Soft, nontender, non distended, normal bowel sounds  SKIN: Dry, intact, No rashes or lesions  MUSCULOSKELETAL: Full range of motion, normal strength  NEURO: sensation intact, extraocular movements intact, no tremor, normal reflexes  PSYCH: Alert and oriented x 3, normal affect, normal mood  CUSHING'S SIGNS: no striae    POCT Blood Glucose.: 208 mg/dL (11-22-22 @ 16:41)  POCT Blood Glucose.: 220 mg/dL (11-22-22 @ 13:33)  POCT Blood Glucose.: 232 mg/dL (11-22-22 @ 10:48)  POCT Blood Glucose.: 311 mg/dL (11-22-22 @ 08:40)                            12.4   7.14  )-----------( 226      ( 22 Nov 2022 10:40 )             38.2       11-22    132<L>  |  95<L>  |  14  ----------------------------<  234<H>  4.8   |  25  |  0.72    eGFR: 101    Ca    9.4      11-22    TPro  8.0  /  Alb  4.4  /  TBili  <0.2  /  DBili  x   /  AST  22  /  ALT  16  /  AlkPhos  132<H>  11-22      A1C with Estimated Average Glucose (11.22.22 @ 10:40)    A1C with Estimated Average Glucose Result: 11.5 %    Estimated Average Glucose: 283

## 2022-11-22 NOTE — CONSULT NOTE ADULT - ASSESSMENT
52 yr old female with uncontrolled dm2, a1c found to be 11%, non adherent to insulins at home arrives with cough, n/v.    Endocrine team consulted for uncontrolled diabetes. Patient is high risk with high level decision making due to uncontrolled diabetes which places patient at high risk for cardiovascular and cerebrovascular events. Patient with lability of glucose requiring close monitoring and insulin adjustments.      Uncontrolled Dm2  target a1c < 7%, pt a1c 11%  Home meds: long acting insulin 16 units- often misses dose, and novolog 10 units tid  CDU PLAN:  Start Lantus 16 units sq q 12pm, received at 2pm today.  Discussed with patient regarding adherence, and patient states changing time of basal insulin would help.  Start Admelog 8 units tid ac- hold if skips meal  Start Admelog low correction scale for ac and for hs  hypoglycemia protocol active  Bedside RN: to reinforce Dm education including diet, timing of insulins, and review insulin pen injection technique  Extensively discussed high a1c and risk of micro/macrovascular complications  Discussed importance of adherence to medication regimen and impact that diet has on diabetes  DISCHARGE PLAN:  Since patient has had some insurance changes, recommend to send both insulins to pharmacy to check insurance coverage.  Additionally please send freestyle chas 2 sensors to pharmacy and check for coverage  Please send Lantus solostar pen and humalog kwikpen as test script to check insurance coverage.  Ok to send with current doses and update prior to d/c, if needed  If Lantus not covered- can try alternating with one of following   tresiba/basaglar/toujeo/Levemir/Semglee  If Humalog not covered- can try alternating with one of following  novolog/apidra/admelog/fiasp  Please send Freestyle chas 2 reader and chas 2 sensors to pharmacy and check insurance coverage.    Ensure patient has working glucometer, test strips, lancets, alcohol pads, and BD saadia pen needles  Please also prescribe glucose tabs, Baqsimi nasal spray or glucagon emergency kit for hypoglycemia risk   Patient has [employer] medicaid, [facility] does not accept.  Please ask patient to call insurance company to find an in network endocrinologist    Constipation  Recommend to check thyroid panel    HLD  Check lipid panel      HTN  Consider Acei or ARB  Urine microalbumin outpatient        Selina Kaur  Nurse Practitioner  Division of Endocrinology & Diabetes  Pager # [phone]      If after 6PM or before 9AM, or on weekends/holidays, please call endocrine answering service for assistance ([phone]).  For nonurgent matters email [email or web address] for assistance.

## 2022-11-22 NOTE — ED CDU PROVIDER INITIAL DAY NOTE - PROGRESS NOTE DETAILS
Seen by Carli. Patient states she was taking her insulin after meals not before and was falling asleep before taking her Lantus. Endo recommends Humalog 8 pre meal tid and moving Lantus 16 units from bedtime to 12pm daily. [facility] does not take her insurance. Patient was instructed to call her insurance company for an Endocrinologist recommendation. CT Head impression: "Subtle decreased attenuation right external capsular region, may   represent an age-indeterminate ischemic event (axial 2-21 and coronal   4-36). Ill-defined density involving the posterior right lobe, which may   represent retinal hemorrhage versus a tumor (as might be seen with   melanoma.    Neuro exam, non-focal. Strength 5/5 to upper and lower extremities. Finger-to-nose are smooth and symmetric.  Sensation intact to light touch.  Moving all extremities equally.  Neurology consult obtained, will be in to see patient within the next half hour. CT Head impression: "Subtle decreased attenuation right external capsular region, may   represent an age-indeterminate ischemic event (axial 2-21 and coronal   4-36). Ill-defined density involving the posterior right lobe, which may   represent retinal hemorrhage versus a tumor (as might be seen with   melanoma.    Neuro exam, non-focal. Strength 5/5 to upper and lower extremities. Finger-to-nose are smooth and symmetric.  Sensation intact to light touch.  Moving all extremities equally.  Neurology consult obtained, will be in to see patient within the next half hour. MRs ordered per CT read recommendations. CT Head impression: "Subtle decreased attenuation right external capsular region, may   represent an age-indeterminate ischemic event (axial 2-21 and coronal   4-36). Ill-defined density involving the posterior right lobe, which may   represent retinal hemorrhage versus a tumor (as might be seen with   melanoma.    Neuro exam, non-focal. Strength 5/5 to upper and lower extremities. Finger-to-nose are smooth and symmetric.  Sensation intact to light touch.  Moving all extremities equally.  Neurology and ophtho consult obtained, will be in to see patient within the next half hour. MRs ordered per CT read recommendations. LUIS Walters: pt resting comfortably in bed, NAD, Opthalmology at bedside, pt states symptoms improved, pt pending MRI, neurology following, pt seen by endocrinology.

## 2022-11-22 NOTE — ED PROVIDER NOTE - PHYSICAL EXAMINATION
CONSTITUTIONAL: well-appearing, in NAD  SKIN: Warm dry  HEAD: NCAT  EYES: no scleral icterus, conjunctiva pink  ENT: normal pharynx with no erythema or exudates  NECK: Supple; non tender.   CARD: RRR, no murmurs.  RESP: clear to ausculation b/l. No crackles or wheezing.  ABD: soft, non-tender, non-distended, no rebound or guarding.  MSK: no pedal edema, no calf tenderness  NEURO:     CRANIAL NERVES:  CN 2: Pupils 3 mm, PERRLA.   CN 3, 4, 6: EOMI.   CN 5: Facial sensation intact to light touch in all 3 divisions b/l.  CN 7: Face is symmetric with normal eye closure, eye opening, and smile.  CN 8: Hearing is intact and appropriate for age/phx, to rubbing fingers.  CN 9 and 10: Palate elevates symmetrically, phonation is normal.    CN 11: Shoulder shrug intact w/ appropriate strength bilaterally.   CN 12: Tongue is midline with normal movements and no atrophy.    MOTOR (Bilateral unless specified below):   Deltoids: 5/5.   Biceps: 5/5.  Tripceps: 5/5.  Wrist Flexors: 5/5.  Wrist Extensors: 5/5.   Hip Flexors: 5/5.  Knee Flexors: 5/5.  Knee Extensors: 5/5.   Plantarflexion: 5/5.  Dorsiflexion: 5/5.     SENSORY (Bilateral unless specified below):  Light touch sensation normal in the UE and LE.     PROPRIOCEPTION and BALANCE (Bilateral unless specified below):   Finger to nose test: Pt performed w/o issue b/l.   Pronator drift: Negative b/l.   Romberg test: Negative.    PSYCH: Cooperative, appropriate.

## 2022-11-22 NOTE — ED CDU PROVIDER INITIAL DAY NOTE - MEDICAL DECISION MAKING DETAILS
51 y/o female with PMHx of DM type 2 and HTN who presented to the ED for dizziness, cough, nausea, and vomiting X 1 day. Sent to OBS for medical management. 51 y/o female with PMHx of DM type 2 and HTN who presented to the ED for dizziness, cough, nausea, and vomiting X 1 day.  Patient found to have a BS in the 200's in the ED with a HGA1C of 11 noncompliant with insulin regimen at home. Does not have an Endocrinologist.  Patient also noted to have elevated BP not on BP meds at home. Sent to CDU for Endo consult, DM education and to start antihypertensive medications- Norvasc suggested by ED team. 53 y/o F presenting to ED with multiple complaints including cough, nausea, vomiting, dizziness found to have a BS in the 200's in the ED with a HGA1C of 11 noncompliant with insulin regimen at home. States she takes "insulin 10 units before meals 3x/day and another insulin at night 16 units" Cannot recall names. Does not have an Endocrinologist.   Patient also noted to have elevated BP, not on BP meds at home. Sent to CDU for Endo consult, DM education and to start antihypertensive medications- Norvasc given in ED.  Plan: Endo consult, diabetes education, glucose monitoring, BP monitoring, Endo outpt follow up

## 2022-11-22 NOTE — ED ADULT NURSE NOTE - OBJECTIVE STATEMENT
Patient to the ED With c/o fo dizziness x 1 day and dry cough x 2 weeks. The patient describes the verbalized having a hx of HTN and DM ( insulin dependent). The patient is alert and oriented x4. The patient stated " I woke up this morning and the room with spinning. I said I have to 911 right away because I know I'm sick".  Patient verbalized having an unsteady gait. No distress noted at this time. Verbalized dizziness stops when she laying down. Denies chest pain, numbnes, tingling or SOB

## 2022-11-22 NOTE — ED PROVIDER NOTE - PROGRESS NOTE DETAILS
Dr. Carrizales: Pt states she did not take her Long acting insulin last night, 16 units. Will order Lantus for pt. Dr. Carrizales: Discussed concern with pt's non-compliance and elevated A1C as well as BP. Offered OBS for management and Endocrine consult. Dr. Carrizales: Discussed concern with pt's non-compliance and elevated A1C as well as BP. Offered OBS for management and Endocrine consult which pt agreed to. Dr. Carrizales: Noted CT head results that were reported from a pt's different images. Pt remains awaiting CT head. GEMINI MD - Called w/ critical labs regarding head imaging. 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 PA made aware and discussed w/ PA plan in-person. Likely admit from CDU w/ neuro/opthal consults.

## 2022-11-22 NOTE — CONSULT NOTE ADULT - ATTENDING COMMENTS
Uvaldo   53 y/o female with uncontrolled DM type 2 and HTN who presented to the ED for dizziness, cough, nausea, and vomiting X 1 day. Found to have a BS in the 200's in the ED with a HGA1C of 11. Sent to CDU for endo consult.  Neurology consulted for CT head imaging showing 'decreased attenuation right external capsular region, may represent an age-indeterminate ischemic event.' Also showed concern for 'R globe retinal hemm vs mass.' Denies diplopia, speech changes, LOC, neck pain, focal numbness/tingling/paresis. Patient on and off takes ASA 81mg. No hx of infarcts in the past. Decreased visual acuity (R>L), not new. No other focal deficits on neuro exam. Further imaging to r/o infarct warranted.     NIHSS: 1   MRS: 0  MRA H/N unreamrkable   MRI brain and orbits: retinal thickening on R>L; old R BG and R frontal infarcts.      A1c 11.5    Impression: old/chronic infarcts likely from small vessel disease. R BG and R frontal  - ASA 81mg PO daily if no obection from optho.    - High dose statin therapy - atorvastatin 40mg PO daily. LDL goal <70mg/dL. PCSK9 is another option.   - TTE can be outpatient as part of stroke workup   - telemetry  - PT/OT/SS/SLP, OOBC, no needs   - check FS, glucose control <180  - GI/DVT ppx  - Counseling on diet, exercise, and medication adherence was done  - Counseling on smoking cessation and alcohol consumption offered when appropriate.  - Pain assessed and judicious use of narcotics when appropriate was discussed.    - Stroke education given when appropriate.  - Importance of fall prevention discussed.   - Differential diagnosis and plan of care discussed with patient and/or family and primary team  - Thank you for allowing me to participate in the care of this patient. Call with questions.   - outpatient f/u with me  Cleve Guerin MD  Vascular Neurology  Office: [phone]

## 2022-11-22 NOTE — CONSULT NOTE ADULT - SUBJECTIVE AND OBJECTIVE BOX
[facility] DEPARTMENT OF OPHTHALMOLOGY - INITIAL ADULT CONSULT  -----------------------------------------------------------------------------------------------------------------  Tania Neville MD PGY 2  Contact via [employer] Teams  -----------------------------------------------------------------------------------------------------------------    HPI: Per ED  52-year-old with a past medical history of insulin-dependent diabetes presenting with multiple chief complaints.  Of note, vital signs indicate a blood pressure of 176/93, pulse ox of 100% afebrile in triage.  Patient has had 2 weeks of dry cough.  With positive sputum production.  Today at 5 AM, patient woke up and felt that the room was spinning when she mekhi to a seated position.  The patient sensation of dizziness went away promptly.  The sensation is usually not there when she is not moving her head it is worsened with head movement,  she has possibly been diagnosed with vertigo in the past but is unsure.  Associated with nausea and vomiting.  Headache started occurring today.  With gradual onset.  Is not on aspirin or Plavix.  Is in between PCPs.  Is not on any other medications.  Has recently also been unsteady on her feet.  She has not fallen nor lost consciousness.  No fevers at home.    Interval History: Ophthalmology consulted because CT scan of head reads "ill-defined density in posterior right lobe representing retinal hemorrhage versus tumor (as can be seen with melanoma". Patient states she has known "bleeding in the back of the eye". She has had chronic vision loss in both eyes, right more than left. She states this all started with her cataract surgeries. Patient states she was seeing a retina specialist for injections in both eyes. Denies any NEW blurry vision at this time. Denies any changes from baseline. Denies any eye pain, double vision, new flashes/floaters/curtains.     PAST MEDICAL & SURGICAL HISTORY:  DM2 (diabetes mellitus, type 2)      HTN (hypertension)      No significant past surgical history        Past Ocular History: PCIOL OU, Retinal intravitreal injections for unknown reason    Family History:  FAMILY HISTORY:  No pertinent family history in first degree relatives    Ophthalmic Medications: None    MEDICATIONS  (STANDING):  dextrose 5%. 1000 milliLiter(s) (50 mL/Hr) IV Continuous <Continuous>  dextrose 5%. 1000 milliLiter(s) (100 mL/Hr) IV Continuous <Continuous>  dextrose 50% Injectable 25 Gram(s) IV Push once  dextrose 50% Injectable 12.5 Gram(s) IV Push once  dextrose 50% Injectable 25 Gram(s) IV Push once  glucagon  Injectable 1 milliGRAM(s) IntraMuscular once  insulin lispro (ADMELOG) corrective regimen sliding scale   SubCutaneous three times a day before meals  insulin lispro Injectable (ADMELOG) 8 Unit(s) SubCutaneous three times a day before meals    MEDICATIONS  (PRN):  dextrose Oral Gel 15 Gram(s) Oral once PRN Blood Glucose LESS THAN 70 milliGRAM(s)/deciliter    Allergies & Intolerances:     Review of Systems:  Constitutional: No fever, chills  Eyes: +blurry vision, flashes, floaters, FBS, erythema, discharge, double vision, OU  Neuro: No tremors  Cardiovascular: No chest pain, palpitations  Respiratory: No SOB, no cough  GI: No nausea, vomiting, abdominal pain  : No dysuria  Skin: no rash  Psych: no depression  Endocrine: no polyuria, polydipsia  Heme/lymph: no swelling    VITALS: T(C): 36.9 (11-22-22 @ 21:40)  T(F): 98.5 (11-22-22 @ 21:40), Max: 98.9 (11-22-22 @ 08:35)  HR: 93 (11-22-22 @ 21:40) (87 - 102)  BP: 115/62 (11-22-22 @ 21:40) (115/62 - 176/93)  RR:  (17 - 18)  SpO2:  (98% - 100%)  Wt(kg): --  General: AAO x 3, appropriate mood and affect    Ophthalmology Exam:  Visual acuity (sc): LP OD, 20/200 -1 OS.  Pupils: OD: peaked inferiorly. reactive. OS: Round reactibe. +APD right  Tonometry:  14 OD, 12 OS  Extraocular movements (EOMs): Full OU, no pain, no diplopia.  Confrontational Visual Field (CVF): Unable OD. Superotemporal and inferotemporal constriction OS.   Color Plates: Unable OD. 6/12 OS.     Pen Light Exam (PLE)  External: Normal OU.  Lids/Lashes/Lacrimal Ducts: Flat OU.  Sclera/Conjunctiva: White and quiet OU.  Cornea: Trace SPK OU.   Anterior Chamber: Deep and formed OU.    Iris: Flat OU.  Lens: PCIOL OU    Fundus Exam: dilated with 1% tropicamide and 2.5% phenylephrine  Approval obtained from primary team for dilation  Patient aware that pupils can remained dilated for at least 4-6 hours.  Exam performed with 20 D lens    Vitreous: wnl OU  Disc, cup/disc: sharp and pink, 0.4 OU  Macula: wnl OU  Vessels: wnl OU  Periphery: wnl OU    Labs/Imaging:  < from: CT Head No Cont (11.22.22 @ 17:45) >  1. Subtle decreased attenuation right external capsular region, may   represent an age-indeterminate ischemic event (axial 2-21 and coronal   4-36).  2. Ill-defined density involving the posterior right lobe, which may   represent retinal hemorrhage versus a tumor (as might be seen with   melanoma).  3. Air-fluid level involving the right frontal sinus with opacification   of anteriorly located right ethmoid air cells. Correlate clinically for   acute sinusitis.    < end of copied text >   [facility] DEPARTMENT OF OPHTHALMOLOGY - INITIAL ADULT CONSULT  -----------------------------------------------------------------------------------------------------------------  Tania Neville MD PGY 2  Contact via [employer] Teams  -----------------------------------------------------------------------------------------------------------------    HPI: Per ED  52-year-old with a past medical history of insulin-dependent diabetes presenting with multiple chief complaints.  Of note, vital signs indicate a blood pressure of 176/93, pulse ox of 100% afebrile in triage.  Patient has had 2 weeks of dry cough.  With positive sputum production.  Today at 5 AM, patient woke up and felt that the room was spinning when she mekhi to a seated position.  The patient sensation of dizziness went away promptly.  The sensation is usually not there when she is not moving her head it is worsened with head movement,  she has possibly been diagnosed with vertigo in the past but is unsure.  Associated with nausea and vomiting.  Headache started occurring today.  With gradual onset.  Is not on aspirin or Plavix.  Is in between PCPs.  Is not on any other medications.  Has recently also been unsteady on her feet.  She has not fallen nor lost consciousness.  No fevers at home.    Interval History: Ophthalmology consulted because CT scan of head reads "ill-defined density in posterior right lobe representing retinal hemorrhage versus tumor (as can be seen with melanoma". Patient states she has known "bleeding in the back of the eye". She has had chronic vision loss in both eyes, right more than left. She states this all started with her cataract surgeries. Patient states she was seeing a retina specialist for injections in both eyes. Denies any NEW blurry vision at this time. Denies any changes from baseline. Denies any eye pain, double vision, new flashes/floaters/curtains.     PAST MEDICAL & SURGICAL HISTORY:  DM2 (diabetes mellitus, type 2)      HTN (hypertension)      No significant past surgical history        Past Ocular History: PCIOL OU, Retinal intravitreal injections for unknown reason    Family History:  FAMILY HISTORY:  No pertinent family history in first degree relatives    Ophthalmic Medications: None    MEDICATIONS  (STANDING):  dextrose 5%. 1000 milliLiter(s) (50 mL/Hr) IV Continuous <Continuous>  dextrose 5%. 1000 milliLiter(s) (100 mL/Hr) IV Continuous <Continuous>  dextrose 50% Injectable 25 Gram(s) IV Push once  dextrose 50% Injectable 12.5 Gram(s) IV Push once  dextrose 50% Injectable 25 Gram(s) IV Push once  glucagon  Injectable 1 milliGRAM(s) IntraMuscular once  insulin lispro (ADMELOG) corrective regimen sliding scale   SubCutaneous three times a day before meals  insulin lispro Injectable (ADMELOG) 8 Unit(s) SubCutaneous three times a day before meals    MEDICATIONS  (PRN):  dextrose Oral Gel 15 Gram(s) Oral once PRN Blood Glucose LESS THAN 70 milliGRAM(s)/deciliter    Allergies & Intolerances:     Review of Systems:  Constitutional: No fever, chills  Eyes: +blurry vision, flashes, floaters, FBS, erythema, discharge, double vision, OU  Neuro: No tremors  Cardiovascular: No chest pain, palpitations  Respiratory: No SOB, no cough  GI: No nausea, vomiting, abdominal pain  : No dysuria  Skin: no rash  Psych: no depression  Endocrine: no polyuria, polydipsia  Heme/lymph: no swelling    VITALS: T(C): 36.9 (11-22-22 @ 21:40)  T(F): 98.5 (11-22-22 @ 21:40), Max: 98.9 (11-22-22 @ 08:35)  HR: 93 (11-22-22 @ 21:40) (87 - 102)  BP: 115/62 (11-22-22 @ 21:40) (115/62 - 176/93)  RR:  (17 - 18)  SpO2:  (98% - 100%)  Wt(kg): --  General: AAO x 3, appropriate mood and affect    Ophthalmology Exam:  Visual acuity (sc): LP OD, 20/200 -1 OS.  Pupils: OD: peaked inferiorly. reactive. OS: Round reactibe. +APD right  Tonometry:  14 OD, 12 OS  Extraocular movements (EOMs): Full OU, no pain, no diplopia.  Confrontational Visual Field (CVF): Unable OD. Superotemporal and inferotemporal constriction OS.   Color Plates: Unable OD. 6/12 OS.     Pen Light Exam (PLE)  External: Normal OU.  Lids/Lashes/Lacrimal Ducts: Flat OU.  Sclera/Conjunctiva: White and quiet OU.  Cornea: Trace SPK OU.   Anterior Chamber: Deep and formed OU.    Iris: Flat OU.  Lens: PCIOL OU    Fundus Exam: dilated with 1% tropicamide and 2.5% phenylephrine  Approval obtained from primary team for dilation  Patient aware that pupils can remained dilated for at least 4-6 hours.  Exam performed with 20 D lens    Vitreous: wnl OU  Disc, cup/disc: sharp and pink, 0.4 OU  Macula: wnl OU  Vessels: wnl OU  Periphery: wnl OU    Labs/Imaging:  < from: CT Head No Cont (11.22.22 @ 17:45) >  1. Subtle decreased attenuation right external capsular region, may   represent an age-indeterminate ischemic event (axial 2-21 and coronal   4-36).  2. Ill-defined density involving the posterior right lobe, which may   represent retinal hemorrhage versus a tumor (as might be seen with   melanoma).  3. Air-fluid level involving the right frontal sinus with opacification   of anteriorly located right ethmoid air cells. Correlate clinically for   acute sinusitis.    < end of copied text >   [facility] DEPARTMENT OF OPHTHALMOLOGY - INITIAL ADULT CONSULT  -----------------------------------------------------------------------------------------------------------------  Tania Neville MD PGY 2  Contact via [employer] Teams  -----------------------------------------------------------------------------------------------------------------    HPI: Per ED  52-year-old with a past medical history of insulin-dependent diabetes presenting with multiple chief complaints.  Of note, vital signs indicate a blood pressure of 176/93, pulse ox of 100% afebrile in triage.  Patient has had 2 weeks of dry cough.  With positive sputum production.  Today at 5 AM, patient woke up and felt that the room was spinning when she mekhi to a seated position.  The patient sensation of dizziness went away promptly.  The sensation is usually not there when she is not moving her head it is worsened with head movement,  she has possibly been diagnosed with vertigo in the past but is unsure.  Associated with nausea and vomiting.  Headache started occurring today.  With gradual onset.  Is not on aspirin or Plavix.  Is in between PCPs.  Is not on any other medications.  Has recently also been unsteady on her feet.  She has not fallen nor lost consciousness.  No fevers at home.    Interval History: Ophthalmology consulted because CT scan of head reads "ill-defined density in posterior right lobe representing retinal hemorrhage versus tumor (as can be seen with melanoma". Patient states she has known "bleeding in the back of the eye". She has had chronic vision loss in both eyes, right more than left. She states this all started with her cataract surgeries. Patient states she was seeing a retina specialist for injections in both eyes. Denies any NEW blurry vision at this time. Denies any changes from baseline. Denies any eye pain, double vision, new flashes/floaters/curtains.     PAST MEDICAL & SURGICAL HISTORY:  DM2 (diabetes mellitus, type 2)      HTN (hypertension)      No significant past surgical history        Past Ocular History: PCIOL OU, Retinal intravitreal injections for unknown reason    Family History:  FAMILY HISTORY:  No pertinent family history in first degree relatives    Ophthalmic Medications: None    MEDICATIONS  (STANDING):  dextrose 5%. 1000 milliLiter(s) (50 mL/Hr) IV Continuous <Continuous>  dextrose 5%. 1000 milliLiter(s) (100 mL/Hr) IV Continuous <Continuous>  dextrose 50% Injectable 25 Gram(s) IV Push once  dextrose 50% Injectable 12.5 Gram(s) IV Push once  dextrose 50% Injectable 25 Gram(s) IV Push once  glucagon  Injectable 1 milliGRAM(s) IntraMuscular once  insulin lispro (ADMELOG) corrective regimen sliding scale   SubCutaneous three times a day before meals  insulin lispro Injectable (ADMELOG) 8 Unit(s) SubCutaneous three times a day before meals    MEDICATIONS  (PRN):  dextrose Oral Gel 15 Gram(s) Oral once PRN Blood Glucose LESS THAN 70 milliGRAM(s)/deciliter    Allergies & Intolerances:     Review of Systems:  Constitutional: No fever, chills  Eyes: +blurry vision, flashes, floaters, FBS, erythema, discharge, double vision, OU  Neuro: No tremors  Cardiovascular: No chest pain, palpitations  Respiratory: No SOB, no cough  GI: No nausea, vomiting, abdominal pain  : No dysuria  Skin: no rash  Psych: no depression  Endocrine: no polyuria, polydipsia  Heme/lymph: no swelling    VITALS: T(C): 36.9 (11-22-22 @ 21:40)  T(F): 98.5 (11-22-22 @ 21:40), Max: 98.9 (11-22-22 @ 08:35)  HR: 93 (11-22-22 @ 21:40) (87 - 102)  BP: 115/62 (11-22-22 @ 21:40) (115/62 - 176/93)  RR:  (17 - 18)  SpO2:  (98% - 100%)  Wt(kg): --  General: AAO x 3, appropriate mood and affect    Ophthalmology Exam:  Visual acuity (sc): LP OD, 20/200 -1 OS.  Pupils: OD: peaked inferiorly. reactive. OS: Round reactibe. +APD right  Tonometry:  14 OD, 12 OS  Extraocular movements (EOMs): Full OU, no pain, no diplopia.  Confrontational Visual Field (CVF): Unable OD. Superotemporal and inferotemporal constriction OS.   Color Plates: Unable OD. 6/12 OS.     Pen Light Exam (PLE)  External: Normal OU.  Lids/Lashes/Lacrimal Ducts: Flat OU.  Sclera/Conjunctiva: White and quiet OU.  Cornea: Trace SPK OU.   Anterior Chamber: Deep and formed OU.    Iris: Flat OU.  Lens: PCIOL OU    Fundus Exam: dilated with 1% tropicamide and 2.5% phenylephrine  Approval obtained from primary team for dilation  Patient aware that pupils can remained dilated for at least 4-6 hours.  Exam performed with 20 D lens    Vitreous: wnl OU  Disc, cup/disc: sharp and pink, 0.4 OU  Macula: wnl OU  Vessels: wnl OU  Periphery: wnl OU    Labs/Imaging:  < from: CT Head No Cont (11.22.22 @ 17:45) >  1. Subtle decreased attenuation right external capsular region, may   represent an age-indeterminate ischemic event (axial 2-21 and coronal   4-36).  2. Ill-defined density involving the posterior right lobe, which may   represent retinal hemorrhage versus a tumor (as might be seen with   melanoma).  3. Air-fluid level involving the right frontal sinus with opacification   of anteriorly located right ethmoid air cells. Correlate clinically for   acute sinusitis.    < end of copied text >   [facility] DEPARTMENT OF OPHTHALMOLOGY - INITIAL ADULT CONSULT  -----------------------------------------------------------------------------------------------------------------  Tania Neville MD PGY 2  Contact via [employer] Teams  -----------------------------------------------------------------------------------------------------------------    HPI: Per ED  52-year-old with a past medical history of insulin-dependent diabetes presenting with multiple chief complaints.  Of note, vital signs indicate a blood pressure of 176/93, pulse ox of 100% afebrile in triage.  Patient has had 2 weeks of dry cough.  With positive sputum production.  Today at 5 AM, patient woke up and felt that the room was spinning when she mekhi to a seated position.  The patient sensation of dizziness went away promptly.  The sensation is usually not there when she is not moving her head it is worsened with head movement,  she has possibly been diagnosed with vertigo in the past but is unsure.  Associated with nausea and vomiting.  Headache started occurring today.  With gradual onset.  Is not on aspirin or Plavix.  Is in between PCPs.  Is not on any other medications.  Has recently also been unsteady on her feet.  She has not fallen nor lost consciousness.  No fevers at home.    Interval History: Ophthalmology consulted because CT scan of head reads "ill-defined density in posterior right lobe representing retinal hemorrhage versus tumor (as can be seen with melanoma". Patient states she has known "bleeding in the back of the eye". She has had chronic vision loss in both eyes, right more than left. She states this all started with her cataract surgeries. Patient states she was seeing a retina specialist for injections in both eyes. Denies any NEW blurry vision at this time. Denies any changes from baseline. Denies any eye pain, double vision, new flashes/floaters/curtains.     PAST MEDICAL & SURGICAL HISTORY:  DM2 (diabetes mellitus, type 2)      HTN (hypertension)      No significant past surgical history        Past Ocular History: PCIOL OU, Retinal intravitreal injections for unknown reason    Family History:  FAMILY HISTORY:  No pertinent family history in first degree relatives    Ophthalmic Medications: None    MEDICATIONS  (STANDING):  dextrose 5%. 1000 milliLiter(s) (50 mL/Hr) IV Continuous <Continuous>  dextrose 5%. 1000 milliLiter(s) (100 mL/Hr) IV Continuous <Continuous>  dextrose 50% Injectable 25 Gram(s) IV Push once  dextrose 50% Injectable 12.5 Gram(s) IV Push once  dextrose 50% Injectable 25 Gram(s) IV Push once  glucagon  Injectable 1 milliGRAM(s) IntraMuscular once  insulin lispro (ADMELOG) corrective regimen sliding scale   SubCutaneous three times a day before meals  insulin lispro Injectable (ADMELOG) 8 Unit(s) SubCutaneous three times a day before meals    MEDICATIONS  (PRN):  dextrose Oral Gel 15 Gram(s) Oral once PRN Blood Glucose LESS THAN 70 milliGRAM(s)/deciliter    Allergies & Intolerances:     Review of Systems:  Constitutional: No fever, chills  Eyes: +blurry vision, flashes, floaters, FBS, erythema, discharge, double vision, OU  Neuro: No tremors  Cardiovascular: No chest pain, palpitations  Respiratory: No SOB, no cough  GI: No nausea, vomiting, abdominal pain  : No dysuria  Skin: no rash  Psych: no depression  Endocrine: no polyuria, polydipsia  Heme/lymph: no swelling    VITALS: T(C): 36.9 (11-22-22 @ 21:40)  T(F): 98.5 (11-22-22 @ 21:40), Max: 98.9 (11-22-22 @ 08:35)  HR: 93 (11-22-22 @ 21:40) (87 - 102)  BP: 115/62 (11-22-22 @ 21:40) (115/62 - 176/93)  RR:  (17 - 18)  SpO2:  (98% - 100%)  Wt(kg): --  General: AAO x 3, appropriate mood and affect    Ophthalmology Exam:  Visual acuity (sc): LP OD, 20/200 -1 OS.  Pupils: OD: peaked inferiorly. reactive. OS: Round reactibe. +APD right  Tonometry:  14 OD, 12 OS  Extraocular movements (EOMs): Full OU, no pain, no diplopia.  Confrontational Visual Field (CVF): Unable OD. Superotemporal and inferotemporal constriction OS.   Color Plates: Unable OD. 6/12 OS.     Pen Light Exam (PLE)  External: Normal OU.  Lids/Lashes/Lacrimal Ducts: Flat OU.  Sclera/Conjunctiva: White and quiet OU.  Cornea: Trace SPK OU.   Anterior Chamber: Deep and formed OU.    Iris: Flat OU.  Lens: PCIOL OU    Fundus Exam: dilated with 1% tropicamide and 2.5% phenylephrine  Approval obtained from primary team for dilation  Patient aware that pupils can remained dilated for at least 4-6 hours.  Exam performed with 20 D lens    Vitreous: wnl OU  Disc, cup/disc: sharp margins, 0.5 OU. NVD OU.   Macula: OD: Tractional Retinal detachment. Fibrovascular tissue with NVE. Pre-retinal heme in periphery. OS: FIbrovascular tissues with NVE. Pre-retinal heme inferiorly.   Vessels: wnl OU  Periphery: Fibrovascular tissue with scattered Blot heme.     Labs/Imaging:  < from: CT Head No Cont (11.22.22 @ 17:45) >  1. Subtle decreased attenuation right external capsular region, may   represent an age-indeterminate ischemic event (axial 2-21 and coronal   4-36).  2. Ill-defined density involving the posterior right lobe, which may   represent retinal hemorrhage versus a tumor (as might be seen with   melanoma).  3. Air-fluid level involving the right frontal sinus with opacification   of anteriorly located right ethmoid air cells. Correlate clinically for   acute sinusitis.    < end of copied text >   [facility] DEPARTMENT OF OPHTHALMOLOGY - INITIAL ADULT CONSULT  -----------------------------------------------------------------------------------------------------------------  Tania Neville MD PGY 2  Contact via [employer] Teams  -----------------------------------------------------------------------------------------------------------------    HPI: Per ED  52-year-old with a past medical history of insulin-dependent diabetes presenting with multiple chief complaints.  Of note, vital signs indicate a blood pressure of 176/93, pulse ox of 100% afebrile in triage.  Patient has had 2 weeks of dry cough.  With positive sputum production.  Today at 5 AM, patient woke up and felt that the room was spinning when she mekhi to a seated position.  The patient sensation of dizziness went away promptly.  The sensation is usually not there when she is not moving her head it is worsened with head movement,  she has possibly been diagnosed with vertigo in the past but is unsure.  Associated with nausea and vomiting.  Headache started occurring today.  With gradual onset.  Is not on aspirin or Plavix.  Is in between PCPs.  Is not on any other medications.  Has recently also been unsteady on her feet.  She has not fallen nor lost consciousness.  No fevers at home.    Interval History: Ophthalmology consulted because CT scan of head reads "ill-defined density in posterior right lobe representing retinal hemorrhage versus tumor (as can be seen with melanoma". Patient states she has known "bleeding in the back of the eye". She has had chronic vision loss in both eyes, right more than left. She states this all started with her cataract surgeries. Patient states she was seeing a retina specialist for injections in both eyes. Denies any NEW blurry vision at this time. Denies any changes from baseline. Denies any eye pain, double vision, new flashes/floaters/curtains.     PAST MEDICAL & SURGICAL HISTORY:  DM2 (diabetes mellitus, type 2)      HTN (hypertension)      No significant past surgical history        Past Ocular History: PCIOL OU, Retinal intravitreal injections for unknown reason    Family History:  FAMILY HISTORY:  No pertinent family history in first degree relatives    Ophthalmic Medications: None    MEDICATIONS  (STANDING):  dextrose 5%. 1000 milliLiter(s) (50 mL/Hr) IV Continuous <Continuous>  dextrose 5%. 1000 milliLiter(s) (100 mL/Hr) IV Continuous <Continuous>  dextrose 50% Injectable 25 Gram(s) IV Push once  dextrose 50% Injectable 12.5 Gram(s) IV Push once  dextrose 50% Injectable 25 Gram(s) IV Push once  glucagon  Injectable 1 milliGRAM(s) IntraMuscular once  insulin lispro (ADMELOG) corrective regimen sliding scale   SubCutaneous three times a day before meals  insulin lispro Injectable (ADMELOG) 8 Unit(s) SubCutaneous three times a day before meals    MEDICATIONS  (PRN):  dextrose Oral Gel 15 Gram(s) Oral once PRN Blood Glucose LESS THAN 70 milliGRAM(s)/deciliter    Allergies & Intolerances:     Review of Systems:  Constitutional: No fever, chills  Eyes: +blurry vision, flashes, floaters, FBS, erythema, discharge, double vision, OU  Neuro: No tremors  Cardiovascular: No chest pain, palpitations  Respiratory: No SOB, no cough  GI: No nausea, vomiting, abdominal pain  : No dysuria  Skin: no rash  Psych: no depression  Endocrine: no polyuria, polydipsia  Heme/lymph: no swelling    VITALS: T(C): 36.9 (11-22-22 @ 21:40)  T(F): 98.5 (11-22-22 @ 21:40), Max: 98.9 (11-22-22 @ 08:35)  HR: 93 (11-22-22 @ 21:40) (87 - 102)  BP: 115/62 (11-22-22 @ 21:40) (115/62 - 176/93)  RR:  (17 - 18)  SpO2:  (98% - 100%)  Wt(kg): --  General: AAO x 3, appropriate mood and affect    Ophthalmology Exam:  Visual acuity (sc): LP OD, 20/200 -1 OS.  Pupils: OD: peaked inferiorly. reactive. OS: Round reactibe. +APD right  Tonometry:  14 OD, 12 OS  Extraocular movements (EOMs): Full OU, no pain, no diplopia.  Confrontational Visual Field (CVF): Unable OD. Superotemporal and inferotemporal constriction OS.   Color Plates: Unable OD. 6/12 OS.     Pen Light Exam (PLE)  External: Normal OU.  Lids/Lashes/Lacrimal Ducts: Flat OU.  Sclera/Conjunctiva: White and quiet OU.  Cornea: Trace SPK OU.   Anterior Chamber: Deep and formed OU.    Iris: Flat OU.  Lens: PCIOL OU    Fundus Exam: dilated with 1% tropicamide and 2.5% phenylephrine  Approval obtained from primary team for dilation  Patient aware that pupils can remained dilated for at least 4-6 hours.  Exam performed with 20 D lens    Vitreous: wnl OU  Disc, cup/disc: sharp margins, 0.5 OU. NVD OU.   Macula: OD: Tractional Retinal detachment. Fibrovascular tissue with NVE. Pre-retinal heme in periphery. OS: FIbrovascular tissues with NVE. Pre-retinal heme inferiorly.   Vessels: wnl OU  Periphery: Fibrovascular tissue with scattered Blot heme.     Labs/Imaging:  < from: CT Head No Cont (11.22.22 @ 17:45) >  1. Subtle decreased attenuation right external capsular region, may   represent an age-indeterminate ischemic event (axial 2-21 and coronal   4-36).  2. Ill-defined density involving the posterior right lobe, which may   represent retinal hemorrhage versus a tumor (as might be seen with   melanoma).  3. Air-fluid level involving the right frontal sinus with opacification   of anteriorly located right ethmoid air cells. Correlate clinically for   acute sinusitis.    < end of copied text >   [facility] DEPARTMENT OF OPHTHALMOLOGY - INITIAL ADULT CONSULT  -----------------------------------------------------------------------------------------------------------------  Tania Neville MD PGY 2  Contact via [employer] Teams  -----------------------------------------------------------------------------------------------------------------    HPI: Per ED  52-year-old with a past medical history of insulin-dependent diabetes presenting with multiple chief complaints.  Of note, vital signs indicate a blood pressure of 176/93, pulse ox of 100% afebrile in triage.  Patient has had 2 weeks of dry cough.  With positive sputum production.  Today at 5 AM, patient woke up and felt that the room was spinning when she mekhi to a seated position.  The patient sensation of dizziness went away promptly.  The sensation is usually not there when she is not moving her head it is worsened with head movement,  she has possibly been diagnosed with vertigo in the past but is unsure.  Associated with nausea and vomiting.  Headache started occurring today.  With gradual onset.  Is not on aspirin or Plavix.  Is in between PCPs.  Is not on any other medications.  Has recently also been unsteady on her feet.  She has not fallen nor lost consciousness.  No fevers at home.    Interval History: Ophthalmology consulted because CT scan of head reads "ill-defined density in posterior right lobe representing retinal hemorrhage versus tumor (as can be seen with melanoma". Patient states she has known "bleeding in the back of the eye". She has had chronic vision loss in both eyes, right more than left. She states this all started with her cataract surgeries. Patient states she was seeing a retina specialist for injections in both eyes. Denies any NEW blurry vision at this time. Denies any changes from baseline. Denies any eye pain, double vision, new flashes/floaters/curtains.     PAST MEDICAL & SURGICAL HISTORY:  DM2 (diabetes mellitus, type 2)      HTN (hypertension)      No significant past surgical history        Past Ocular History: PCIOL OU, Retinal intravitreal injections for unknown reason    Family History:  FAMILY HISTORY:  No pertinent family history in first degree relatives    Ophthalmic Medications: None    MEDICATIONS  (STANDING):  dextrose 5%. 1000 milliLiter(s) (50 mL/Hr) IV Continuous <Continuous>  dextrose 5%. 1000 milliLiter(s) (100 mL/Hr) IV Continuous <Continuous>  dextrose 50% Injectable 25 Gram(s) IV Push once  dextrose 50% Injectable 12.5 Gram(s) IV Push once  dextrose 50% Injectable 25 Gram(s) IV Push once  glucagon  Injectable 1 milliGRAM(s) IntraMuscular once  insulin lispro (ADMELOG) corrective regimen sliding scale   SubCutaneous three times a day before meals  insulin lispro Injectable (ADMELOG) 8 Unit(s) SubCutaneous three times a day before meals    MEDICATIONS  (PRN):  dextrose Oral Gel 15 Gram(s) Oral once PRN Blood Glucose LESS THAN 70 milliGRAM(s)/deciliter    Allergies & Intolerances:     Review of Systems:  Constitutional: No fever, chills  Eyes: +blurry vision, flashes, floaters, FBS, erythema, discharge, double vision, OU  Neuro: No tremors  Cardiovascular: No chest pain, palpitations  Respiratory: No SOB, no cough  GI: No nausea, vomiting, abdominal pain  : No dysuria  Skin: no rash  Psych: no depression  Endocrine: no polyuria, polydipsia  Heme/lymph: no swelling    VITALS: T(C): 36.9 (11-22-22 @ 21:40)  T(F): 98.5 (11-22-22 @ 21:40), Max: 98.9 (11-22-22 @ 08:35)  HR: 93 (11-22-22 @ 21:40) (87 - 102)  BP: 115/62 (11-22-22 @ 21:40) (115/62 - 176/93)  RR:  (17 - 18)  SpO2:  (98% - 100%)  Wt(kg): --  General: AAO x 3, appropriate mood and affect    Ophthalmology Exam:  Visual acuity (sc): LP OD, 20/200 -1 OS.  Pupils: OD: peaked inferiorly. reactive. OS: Round reactibe. +APD right  Tonometry:  14 OD, 12 OS  Extraocular movements (EOMs): Full OU, no pain, no diplopia.  Confrontational Visual Field (CVF): Unable OD. Superotemporal and inferotemporal constriction OS.   Color Plates: Unable OD. 6/12 OS.     Pen Light Exam (PLE)  External: Normal OU.  Lids/Lashes/Lacrimal Ducts: Flat OU.  Sclera/Conjunctiva: White and quiet OU.  Cornea: Trace SPK OU.   Anterior Chamber: Deep and formed OU.    Iris: Flat OU.  Lens: PCIOL OU    Fundus Exam: dilated with 1% tropicamide and 2.5% phenylephrine  Approval obtained from primary team for dilation  Patient aware that pupils can remained dilated for at least 4-6 hours.  Exam performed with 20 D lens    Vitreous: wnl OU  Disc, cup/disc: sharp margins, 0.5 OU. NVD OU.   Macula: OD: Tractional Retinal detachment. Fibrovascular tissue with NVE. Pre-retinal heme in periphery. OS: FIbrovascular tissues with NVE. Pre-retinal heme inferiorly.   Vessels: wnl OU  Periphery: Fibrovascular tissue with scattered Blot heme.     Labs/Imaging:  < from: CT Head No Cont (11.22.22 @ 17:45) >  1. Subtle decreased attenuation right external capsular region, may   represent an age-indeterminate ischemic event (axial 2-21 and coronal   4-36).  2. Ill-defined density involving the posterior right lobe, which may   represent retinal hemorrhage versus a tumor (as might be seen with   melanoma).  3. Air-fluid level involving the right frontal sinus with opacification   of anteriorly located right ethmoid air cells. Correlate clinically for   acute sinusitis.    < end of copied text >

## 2022-11-22 NOTE — ED PROVIDER NOTE - CLINICAL SUMMARY MEDICAL DECISION MAKING FREE TEXT BOX
52-year-old female presenting with sudden onset (this morning) hiccups, nausea, headache, vertigo in the setting of dry cough starting 2 weeks ago, found to have a blood pressure of 176/93.  He is not on aspirin or Plavix.  Likely is a viral illness that has started this malaise and sickness 2 weeks ago starting with a dry cough.  Patient is not medically optimized at home and with her blood pressure here there is a concern for possible atherosclerosis or chronic infarcts of the brain.  We will CTA head and neck check urine and x-ray for possible infection such as pneumonia or UTI.  Due to poor compliance with her insulin, will check hemoglobin A1c and will also check VBG to make sure patient is not in DKA.  We will get a head CT to rule out masses or bleeds.  Less likely given good neuro exam.  Given episodic dizziness, could also be peripheral vertigo versus central.

## 2022-11-22 NOTE — CONSULT NOTE ADULT - ASSESSMENT
Assessment: *Brief summary w/ reason why presented to hospital/admitted, chief concern why neuro was consulted, pertinent history/physical exam/imaging/studies/labs. Differential diagnosis.        Plan:  -MRI brain w/ w/o  -MRI orbits w/ w/o  -MRA head/neck w/  -Optho c/s  -DM management per primary team and endo c/s  -Further recs pending above imaging, notably for possible start of AP therapy    -Management & disposition to be discussed with neuro attending, Dr. Guerin Assessment: 53 y/o female with PMHx of DM type 2 and HTN who presented to the ED for dizziness, cough, nausea, and vomiting X 1 day. Found to have a BS in the 200's in the ED with a HGA1C of 11. Sent to CDU for endo consult.  Neurology consulted for CT head imaging showing 'decreased attenuation right external capsular region, may represent an age-indeterminate ischemic event.' Also showed concern for 'R globe retinal hemm vs mass.' Denies blurry vision, diplopia, speech changes, LOC, neck pain, focal numbness/tingling/paresis. Patient on and off takes ASA 81mg. No hx of infarcts in the past. No focal deficits on neuro exam. Further imaging to r/o infarct warranted.     (Stroke only)  NIHSS: 0   MRS: 0    Plan:  -MRI brain w/ w/o  -MRI orbits w/ w/o  -MRA head/neck w/  -Optho c/s  -DM management per primary team and endo c/s  -Further recs pending above imaging, notably for possibly start AP therapy    -Management & disposition to be discussed with neuro attending, Dr. Guerin Assessment: 53 y/o female with PMHx of DM type 2 and HTN who presented to the ED for dizziness, cough, nausea, and vomiting X 1 day. Found to have a BS in the 200's in the ED with a HGA1C of 11. Sent to CDU for endo consult.  Neurology consulted for CT head imaging showing 'decreased attenuation right external capsular region, may represent an age-indeterminate ischemic event.' Also showed concern for 'R globe retinal hemm vs mass.' Denies diplopia, speech changes, LOC, neck pain, focal numbness/tingling/paresis. Patient on and off takes ASA 81mg. No hx of infarcts in the past. No focal deficits on neuro exam. Further imaging to r/o infarct warranted.     (Stroke only)  NIHSS: 0   MRS: 0    Plan:  -MRI brain w/ w/o  -MRI orbits w/ w/o  -MRA head/neck w/  -Optho c/s  -DM management per primary team and endo c/s  -Further recs pending above imaging, notably for possibly start AP therapy    -Management & disposition to be discussed with neuro attending, Dr. Guerin Assessment: 51 y/o female with PMHx of DM type 2 and HTN who presented to the ED for dizziness, cough, nausea, and vomiting X 1 day. Found to have a BS in the 200's in the ED with a HGA1C of 11. Sent to CDU for endo consult.  Neurology consulted for CT head imaging showing 'decreased attenuation right external capsular region, may represent an age-indeterminate ischemic event.' Also showed concern for 'R globe retinal hemm vs mass.' Denies diplopia, speech changes, LOC, neck pain, focal numbness/tingling/paresis. Patient on and off takes ASA 81mg. No hx of infarcts in the past. Decreased visual acuity (R>L), not new. No other focal deficits on neuro exam. Further imaging to r/o infarct warranted.     (Stroke only)  NIHSS: 1   MRS: 0    Plan:  -MRI brain w/ w/o  -MRI orbits w/ w/o  -MRA head/neck w/  -Optho c/s  -DM management per primary team and endo c/s  -Further recs pending above imaging, notably for possibly start AP therapy    -Management & disposition to be discussed with neuro attending, Dr. Guerin

## 2022-11-22 NOTE — CONSULT NOTE ADULT - ASSESSMENT
Assessment and Recommendations:  52y female with a past medical history insulin-dependent diabetes and ocular history of PCIOL OU and retinal intra-vitreal injections for unknown reason, ophthalmology consulted for evaluation in setting of incidental findings of "intra-retinal hemorrhage vs tumor" on CT scan.     #Retinal Hemorrhage OU  #Vitreous Hemorrhage OD  -Likely in setting of proliferative diabetic retinopathy given patient's history of uncontrolled diabetes  -Patient will likely need continued intravitreal injections as an outpatient to control retinal and vitreous hemorrhage  - Recommend head of bed elevation at all times  - no heavy lifting, straining, or bending forward  - no NSAIDs; may continue antiplatelets if clinically necessary per primary team  - Cautioned for urgent reevaluation if symptoms of curtain vision loss or severe flashing of lights occurs      DW Dr. Beltran        Outpatient Follow-up: Patient should follow-up with his/her ophthalmologist or with [facility] Department of Ophthalmology within 1 week of after discharge at:    [address]  [place] [address] [phone]    Tania Neville MD PGY 1  Available on [employer] Teams Assessment and Recommendations:  52y female with a past medical history insulin-dependent diabetes and ocular history of PCIOL OU and retinal intra-vitreal injections for unknown reason, ophthalmology consulted for evaluation in setting of incidental findings of "intra-retinal hemorrhage vs tumor" on CT scan.     #Retinal Hemorrhage OU  #Vitreous Hemorrhage OD  -Likely in setting of proliferative diabetic retinopathy given patient's history of uncontrolled diabetes  -Patient will likely need continued intravitreal injections as an outpatient to control retinal and vitreous hemorrhage  - Recommend head of bed elevation at all times  - no heavy lifting, straining, or bending forward  - no NSAIDs; may continue antiplatelets if clinically necessary per primary team  - Cautioned for urgent reevaluation if symptoms of curtain vision loss or severe flashing of lights occurs      DW Dr. Beltran        Outpatient Follow-up: Patient should follow-up with his/her ophthalmologist or with [facility] Department of Ophthalmology within 1 week of after discharge at:    [address]  [place] [address] [phone]    Tania Neville MD PGY 1  Available on [employer] Teams Assessment and Recommendations:  52y female with a past medical history insulin-dependent diabetes and ocular history of PCIOL OU and retinal intra-vitreal injections for unknown reason, ophthalmology consulted for evaluation in setting of incidental findings of "intra-retinal hemorrhage vs tumor" on CT scan.     #Retinal Hemorrhage OU  #Vitreous Hemorrhage OD  -Likely in setting of proliferative diabetic retinopathy given patient's history of uncontrolled diabetes  -Patient will likely need continued intravitreal injections as an outpatient to control retinal and vitreous hemorrhage  - Recommend head of bed elevation at all times  - no heavy lifting, straining, or bending forward  - no NSAIDs; may continue antiplatelets if clinically necessary per primary team  - Cautioned for urgent reevaluation if symptoms of curtain vision loss or severe flashing of lights occurs      DW Dr. Beltran        Outpatient Follow-up: Patient should follow-up with his/her ophthalmologist or with [facility] Department of Ophthalmology within 1 week of after discharge at:    [address]  [place] [address] [phone]    Tania Neville MD PGY 1  Available on [employer] Teams Assessment and Recommendations:  52y female with a past medical history insulin-dependent diabetes and ocular history of PCIOL OU and retinal intra-vitreal injections for unknown reason, ophthalmology consulted for evaluation in setting of incidental findings of "intra-retinal hemorrhage vs tumor" on CT scan.     #Proliferative Vitreoretinopathy with retinal hemorrhages OU  -Patient is at her baseline visual acuity subjectively.  -Retinal findings likely all secondary to uncontrolled diabetes. 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   -No masses identified on B-scan (placed in chart)  -Patient will need continued intravitreal anti-VEGF injections as an outpatient to control retinal hemorrhage and curb neovascularization  -Please let ophthalmology know if patient develops any new onset curtains/flashes in LEFT eye. Patient is essentially monocular, all efforts should be made to protect vision in her left eye.   -Diabetes management per primary team    DW Dr. Devin NUÑEZ PGY 4    Outpatient Follow-up: Patient should follow-up with his/her ophthalmologist or with [facility] Department of Ophthalmology within 1 week of after discharge at:    [address]  [place] [address]  [phone]    Tania Neville MD PGY 2  Available on [employer] Teams Assessment and Recommendations:  52y female with a past medical history insulin-dependent diabetes and ocular history of PCIOL OU and retinal intra-vitreal injections for unknown reason, ophthalmology consulted for evaluation in setting of incidental findings of "intra-retinal hemorrhage vs tumor" on CT scan.     #Proliferative Vitreoretinopathy with retinal hemorrhages OU  -Patient is at her baseline visual acuity subjectively.  -Retinal findings likely all secondary to uncontrolled diabetes. 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   -No masses identified on B-scan (placed in chart)  -Patient will need continued intravitreal anti-VEGF injections as an outpatient to control retinal hemorrhage and curb neovascularization  -Please let ophthalmology know if patient develops any new onset curtains/flashes in LEFT eye. Patient is essentially monocular, all efforts should be made to protect vision in her left eye.   -Diabetes management per primary team    DW Dr. Devin NUÑEZ PGY 4    Outpatient Follow-up: Patient should follow-up with his/her ophthalmologist or with [facility] Department of Ophthalmology within 1 week of after discharge at:    [address]  [place] [address]  [phone]    Tania Neville MD PGY 2  Available on [employer] Teams Assessment and Recommendations:  52y female with a past medical history insulin-dependent diabetes and ocular history of PCIOL OU and retinal intra-vitreal injections for unknown reason, ophthalmology consulted for evaluation in setting of incidental findings of "intra-retinal hemorrhage vs tumor" on CT scan.     #Proliferative Vitreoretinopathy with retinal hemorrhages OU  -Patient is at her baseline visual acuity subjectively.  -Retinal findings likely all secondary to uncontrolled diabetes. 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   -No masses identified on B-scan (placed in chart)  -Patient will need continued intravitreal anti-VEGF injections as an outpatient to control retinal hemorrhage and curb neovascularization  -Please let ophthalmology know if patient develops any new onset curtains/flashes in LEFT eye. Patient is essentially monocular, all efforts should be made to protect vision in her left eye.   -Diabetes management per primary team    DW Dr. Devin NUÑEZ PGY 4    Outpatient Follow-up: Patient should follow-up with his/her ophthalmologist or with [facility] Department of Ophthalmology within 1 week of after discharge at:    [address]  [place] [address]  [phone]    Tania Neville MD PGY 2  Available on [employer] Teams

## 2022-11-22 NOTE — ED ADULT NURSE NOTE - CHPI ED NUR TIMING2
Vancomycin IV Pharmacy-to-Dose Consultation   Erick Pizano is a 39 y o  male who was receiving Vancomycin IV with management by the Pharmacy Consult service for treatment of staphylococcus aureus bacteremia  The patients Vancomycin therapy has discontinued  Thank you for allowing us to take part in this patient's care  Pharmacy will sign-off now; please call or re-consult if there are any questions         Imani Cardona, [address]    [phone] none

## 2022-11-23 VITALS
HEART RATE: 97 BPM | SYSTOLIC BLOOD PRESSURE: 125 MMHG | RESPIRATION RATE: 17 BRPM | DIASTOLIC BLOOD PRESSURE: 67 MMHG | TEMPERATURE: 98 F | OXYGEN SATURATION: 100 %

## 2022-11-23 LAB
CHOLEST SERPL-MCNC: 237 MG/DL — HIGH
HDLC SERPL-MCNC: 48 MG/DL — LOW
LIPID PNL WITH DIRECT LDL SERPL: 171 MG/DL — HIGH
NON HDL CHOLESTEROL: 189 MG/DL — HIGH
T4 FREE SERPL-MCNC: 1.2 NG/DL — SIGNIFICANT CHANGE UP (ref 0.9–1.8)
TRIGL SERPL-MCNC: 91 MG/DL — SIGNIFICANT CHANGE UP
TSH SERPL-MCNC: 0.76 UIU/ML — SIGNIFICANT CHANGE UP (ref 0.27–4.2)

## 2022-11-23 PROCEDURE — 70553 MRI BRAIN STEM W/O & W/DYE: CPT | Mod: 26,MA

## 2022-11-23 PROCEDURE — 70548 MR ANGIOGRAPHY NECK W/DYE: CPT | Mod: 26

## 2022-11-23 PROCEDURE — 70544 MR ANGIOGRAPHY HEAD W/O DYE: CPT | Mod: 26,59,MA

## 2022-11-23 PROCEDURE — 70543 MRI ORBT/FAC/NCK W/O &W/DYE: CPT | Mod: 26,MA

## 2022-11-23 PROCEDURE — 99217: CPT

## 2022-11-23 RX ORDER — INSULIN LISPRO 100/ML
8 VIAL (ML) SUBCUTANEOUS
Qty: 1 | Refills: 0
Start: 2022-11-23 | End: 2022-12-22

## 2022-11-23 RX ORDER — ATORVASTATIN CALCIUM 80 MG/1
20 TABLET, FILM COATED ORAL AT BEDTIME
Refills: 0 | Status: DISCONTINUED | OUTPATIENT
Start: 2022-11-23 | End: 2022-11-25

## 2022-11-23 RX ORDER — INSULIN GLARGINE 100 [IU]/ML
17 INJECTION, SOLUTION SUBCUTANEOUS
Qty: 0 | Refills: 0 | DISCHARGE
Start: 2022-11-23 | End: 2022-12-22

## 2022-11-23 RX ORDER — ACETAMINOPHEN 500 MG
650 TABLET ORAL ONCE
Refills: 0 | Status: COMPLETED | OUTPATIENT
Start: 2022-11-23 | End: 2022-11-23

## 2022-11-23 RX ORDER — INSULIN GLARGINE 100 [IU]/ML
16 INJECTION, SOLUTION SUBCUTANEOUS
Qty: 1 | Refills: 0
Start: 2022-11-23 | End: 2022-12-22

## 2022-11-23 RX ORDER — SIMVASTATIN 20 MG/1
1 TABLET, FILM COATED ORAL
Qty: 30 | Refills: 0
Start: 2022-11-23 | End: 2022-12-22

## 2022-11-23 RX ORDER — INSULIN LISPRO 100/ML
10 VIAL (ML) SUBCUTANEOUS
Qty: 0 | Refills: 0 | DISCHARGE
Start: 2022-11-23 | End: 2022-12-22

## 2022-11-23 RX ORDER — ISOPROPYL ALCOHOL, BENZOCAINE .7; .06 ML/ML; ML/ML
1 SWAB TOPICAL
Qty: 100 | Refills: 1
Start: 2022-11-23 | End: 2023-01-11

## 2022-11-23 RX ORDER — INSULIN LISPRO 100/ML
10 VIAL (ML) SUBCUTANEOUS
Refills: 0 | Status: DISCONTINUED | OUTPATIENT
Start: 2022-11-23 | End: 2022-11-25

## 2022-11-23 RX ORDER — INSULIN GLARGINE 100 [IU]/ML
20 INJECTION, SOLUTION SUBCUTANEOUS
Refills: 0 | Status: DISCONTINUED | OUTPATIENT
Start: 2022-11-23 | End: 2022-11-25

## 2022-11-23 RX ADMIN — Medication 650 MILLIGRAM(S): at 08:53

## 2022-11-23 RX ADMIN — Medication 1: at 11:38

## 2022-11-23 RX ADMIN — Medication 10 UNIT(S): at 11:38

## 2022-11-23 RX ADMIN — Medication 650 MILLIGRAM(S): at 09:23

## 2022-11-23 RX ADMIN — Medication 2: at 07:35

## 2022-11-23 RX ADMIN — Medication 8 UNIT(S): at 07:36

## 2022-11-23 NOTE — ED CDU PROVIDER SUBSEQUENT DAY NOTE - ATTENDING APP SHARED VISIT CONTRIBUTION OF CARE
I have made the decision to admit this patient to the CDU as documented in my Provider note I have reviewed the note  written by Kevin Sen [facility], on that visit day. I have supervised and participated as necessary in the performance of procedures indicated for patient management and was available at all phases of the patient´s visit when needed.    Please see the provider note for the details of the decision to admit  Vital Signs Last 24 Hrs  T(F): 98 HR: 84 BP: 147/84 RR: 16 SpO2: 99% (23 Nov 2022 05:53) (96% - 100%)  PE unchanged at time of admission  cough nausea CTA head and neck = suspicious lesion   MRI IMPRESSIONS:    1.  MR ORBITS:   Right retinal thickening material suggestive of   hemorrhagic fluid, moderate on the right, minimal on the left  2.  MR BRAIN:  Right basal ganglia and right frontal subcortical white   matter remote infarctions. Few cerebral hemispheric white matter lesions   consistent with ischemic white matter disease, advanced for age.    Plan Cleared by Neuro for d/c on statin and baby ASA (provided optho clears her for ASA)  Endocrine reccs noted will follow  Will reassess; probable d/c   c/o groin pain dysuria; UA negative will give APAP I have made the decision to admit this patient to the CDU as documented in my Provider note I have reviewed the note  written by Kevin Sen [facility], on that visit day. I have supervised and participated as necessary in the performance of procedures indicated for patient management and was available at all phases of the patient´s visit when needed.    Please see the provider note for the details of the decision to admit  Vital Signs Last 24 Hrs  T(F): 98 HR: 84 BP: 147/84 RR: 16 SpO2: 99% (23 Nov 2022 05:53) (96% - 100%)  PE unchanged at time of admission  cough nausea CTA head and neck = suspicious lesion   MRI IMPRESSIONS:    1.  MR ORBITS:   Right retinal thickening material suggestive of   hemorrhagic fluid, moderate on the right, minimal on the left  2.  MR BRAIN:  Right basal ganglia and right frontal subcortical white   matter remote infarctions. Few cerebral hemispheric white matter lesions   consistent with ischemic white matter disease, advanced for age.    Plan Cleared by Neuro for d/c on statin and baby ASA (provided optho clears her for ASA)  Endocrine reccs noted Dr Tripp to reeval as outpt  Will reassess; probable d/c   c/o groin pain dysuria; UA negative will give APAP

## 2022-11-23 NOTE — ED CDU PROVIDER SUBSEQUENT DAY NOTE - MEDICAL DECISION MAKING DETAILS
51 y/o female with a hx of DM, HTN presented to the ER c/o dizziness, cough, nausea, vomiting.  Pt placed in CDU for MRI, Glucose control, Endocrinology consult, Opthalmology consult, neurology following.

## 2022-11-23 NOTE — ED CDU PROVIDER SUBSEQUENT DAY NOTE - NS ED ATTENDING STATEMENT MOD
This was a shared visit with the GAMALIEL. I reviewed and verified the documentation and independently performed the documented: HPI:  69 y/o male former smoker (60 PYH) with pmh of HTN (controlled on meds), HLD, CAD s/p MI (2004), PCI/LAD stent 2004, HFreF 2/2 ICM AICD 2005, Gen change 2011, LVEF 30% (as per pt), severe AS s/p AVR 2014 after 12 sec of Asystole/Syncope with 1 shock, Afib post procedure s/p DCCV 2014 with return 3 weeks later (on Eliquis last dose 7/9 AM) seen and evaluated by Dr. Snowden, EP for reports of shortness of breath from his AF noted to have atrial flutter and presents for Aflutter ablation with ANDREY prior.  Pt denies symptoms presently.      HF/Cardiologist - Dr. De Leon (taking over from Dr. Ricci) (10 Jul 2017 11:48)    Pt s/p Aflutter ablation via RFV with no site complications or Tele events overnight.  Pt restarted on Eliquis and will f/u with Dr. Snowden as scheduled.  Pt tolerated procedure well with no post procedure complications and hospitalization remained uneventful. Pt stable for discharge home.

## 2022-11-23 NOTE — ED CDU PROVIDER SUBSEQUENT DAY NOTE - PROGRESS NOTE DETAILS
Pt feeling better after CDU stay. Pt seen and eval by neuro attending, rec to dc on ASA and statin. Spoke with ohptho, ok to take ASA with retinal hemorrhage since benefits outweigh the risks. Pt is stable for dc w/ close outpatient f/u

## 2022-11-23 NOTE — ED CDU PROVIDER DISPOSITION NOTE - PATIENT PORTAL LINK FT
You can access the FollowMyHealth Patient Portal offered by [facility] by registering at the following website: [email or web address]. By joining [employer]’s FollowMyHealth portal, you will also be able to view your health information using other applications (apps) compatible with our system.

## 2022-11-23 NOTE — ED CDU PROVIDER DISPOSITION NOTE - ATTENDING CONTRIBUTION TO CARE
I have made the decision to discharge this patient to the CDU as documented in my Provider note I have reviewed the note  written by Kevin Sen [facility], on that visit day. I have supervised and participated as necessary in the performance of procedures indicated for patient management and was available at all phases of the patient´s visit when needed.    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

## 2022-11-23 NOTE — ED ADULT NURSE REASSESSMENT NOTE - NS ED NURSE REASSESS COMMENT FT1
Pt escorted to Pharmacy to  home medications, Pt escorted back to [place] for assistance in calling a cab.  Pt denies any adverse symptoms at this time.  Medication instructions reviewed with patient including daily lantus to be taken today.  instructions written down by author read to patient by author, read back by patient.

## 2022-11-23 NOTE — ED CDU PROVIDER DISPOSITION NOTE - CLINICAL COURSE
51 y/o female pmh htn dm c/o dizziness, nausea and vomiting. pt was found to be hyperglycemic and had abnormal CT head. Pt was sent to CDU for endocrine, neuro and ophtho eval w/ glucose control. Pt was seen by endo who rec to dc with lantus 16 and novolog 8, neuro rec to dc on ASA and statin. Optho will continue outpatient management for retinal hemorrhage. Pt felt better after CDU stay and is stable for dc.

## 2022-11-25 LAB
-  AMIKACIN: SIGNIFICANT CHANGE UP
-  AMOXICILLIN/CLAVULANIC ACID: SIGNIFICANT CHANGE UP
-  AMPICILLIN/SULBACTAM: SIGNIFICANT CHANGE UP
-  AMPICILLIN: SIGNIFICANT CHANGE UP
-  AZTREONAM: SIGNIFICANT CHANGE UP
-  CEFAZOLIN: SIGNIFICANT CHANGE UP
-  CEFEPIME: SIGNIFICANT CHANGE UP
-  CEFOXITIN: SIGNIFICANT CHANGE UP
-  CEFTRIAXONE: SIGNIFICANT CHANGE UP
-  CIPROFLOXACIN: SIGNIFICANT CHANGE UP
-  ERTAPENEM: SIGNIFICANT CHANGE UP
-  GENTAMICIN: SIGNIFICANT CHANGE UP
-  LEVOFLOXACIN: SIGNIFICANT CHANGE UP
-  MEROPENEM: SIGNIFICANT CHANGE UP
-  NITROFURANTOIN: SIGNIFICANT CHANGE UP
-  PIPERACILLIN/TAZOBACTAM: SIGNIFICANT CHANGE UP
-  TOBRAMYCIN: SIGNIFICANT CHANGE UP
-  TRIMETHOPRIM/SULFAMETHOXAZOLE: SIGNIFICANT CHANGE UP
CULTURE RESULTS: SIGNIFICANT CHANGE UP
METHOD TYPE: SIGNIFICANT CHANGE UP
ORGANISM # SPEC MICROSCOPIC CNT: SIGNIFICANT CHANGE UP
ORGANISM # SPEC MICROSCOPIC CNT: SIGNIFICANT CHANGE UP
SPECIMEN SOURCE: SIGNIFICANT CHANGE UP

## 2022-11-25 RX ORDER — CEFDINIR 250 MG/5ML
1 POWDER, FOR SUSPENSION ORAL
Qty: 14 | Refills: 0
Start: 2022-11-25 | End: 2022-12-01

## 2022-11-25 NOTE — ED POST DISCHARGE NOTE - DETAILS
patient symptomatic- Cefdinir 300mg 1 tab 2x/day for 7 days. PMD follow up advised. Strict ED return precautions discussed. Patient understands and agrees.

## 2023-01-28 ENCOUNTER — EMERGENCY (EMERGENCY)
Facility: HOSPITAL | Age: 53
LOS: 1 days | Discharge: HOME | End: 2023-01-28
Attending: EMERGENCY MEDICINE | Admitting: EMERGENCY MEDICINE
Payer: MEDICAID

## 2023-01-28 VITALS
SYSTOLIC BLOOD PRESSURE: 142 MMHG | OXYGEN SATURATION: 100 % | HEART RATE: 98 BPM | DIASTOLIC BLOOD PRESSURE: 87 MMHG | TEMPERATURE: 98 F | RESPIRATION RATE: 16 BRPM

## 2023-01-28 VITALS
SYSTOLIC BLOOD PRESSURE: 143 MMHG | HEART RATE: 96 BPM | DIASTOLIC BLOOD PRESSURE: 94 MMHG | TEMPERATURE: 98 F | OXYGEN SATURATION: 98 % | RESPIRATION RATE: 16 BRPM

## 2023-01-28 PROBLEM — E11.9 TYPE 2 DIABETES MELLITUS WITHOUT COMPLICATIONS: Chronic | Status: ACTIVE | Noted: 2022-11-22

## 2023-01-28 PROCEDURE — 99284 EMERGENCY DEPT VISIT MOD MDM: CPT

## 2023-01-28 NOTE — ED PROVIDER NOTE - PROGRESS NOTE DETAILS
Evaluated by ophthalmology, pt refused dilation exam at this time. Ophtho recommending discharge with outpatient surgery on Monday, clinic to call and schedule appointment for her.  Patient requesting /home care services. Typically independent in all ADLs but now concerned given worsening vision. lives with family but states they will not be able to help her.  consulted.

## 2023-01-28 NOTE — ED PROVIDER NOTE - CLINICAL SUMMARY MEDICAL DECISION MAKING FREE TEXT BOX
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 Was evaluated by ophthalmology, found to have L fresh vitreous hemorrhage on exam, suspect 2/2 proliferative vitreoretinopathy 2/2 uncontrolled T2DM, recommending discharge home with outpatient ophtho follow up on Monday for surgical evaluation.

## 2023-01-28 NOTE — ED PROVIDER NOTE - NSFOLLOWUPCLINICS_GEN_ALL_ED_FT
[facility] Endocrinology  Endocrinology  [address] [place] [address]  Phone: [phone]  Fax:

## 2023-01-28 NOTE — ED PROVIDER NOTE - CHIEF COMPLAINT
The patient is a 52y Female complaining of L vision change The patient is a 52y Female complaining of decreased L vision

## 2023-01-28 NOTE — ED PROVIDER NOTE - OBJECTIVE STATEMENT
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 Pt states she was watching TV last night, fell asleep, and when she woke up, noticed blurry vision in L eye, not dark. Was previously receiving intravitreous injections for eyes but stopped because she believe it was making her vision worse. Denies any headache, eye pain, flashing lights, fever/chills, URI sxs, CP, SOB. 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 Pt states she was watching TV last night, fell asleep, and when she woke up, noticed blurry vision in L eye, not dark. Was previously receiving intravitreous injections for eyes but stopped because she believed it was making her vision worse. Denies any headache, eye pain, flashing lights, fever/chills, URI sxs, CP, SOB.

## 2023-01-28 NOTE — ED PROVIDER NOTE - PATIENT PORTAL LINK FT
You can access the FollowMyHealth Patient Portal offered by [facility] by registering at the following website: [email or web address]. By joining [employer]’s FollowMyHealth portal, you will also be able to view your health information using other applications (apps) compatible with our system.

## 2023-01-28 NOTE — ED PROVIDER NOTE - NS ED ROS FT
REVIEW OF SYSTEMS:  CONSTITUTIONAL: No fevers or chills  EYES/ENT: No vertigo or throat pain   NECK: No pain or stiffness  RESPIRATORY: No cough, wheezing, hemoptysis; No shortness of breath  CARDIOVASCULAR: No chest pain or palpitations  GASTROINTESTINAL: No abdominal or epigastric pain. No nausea, vomiting, or hematemesis; No diarrhea or constipation. No melena or hematochezia.  GENITOURINARY: No dysuria, frequency or hematuria  NEUROLOGICAL: No syncope or dizziness  SKIN: No itching, rashes

## 2023-01-28 NOTE — ED PROVIDER NOTE - ATTENDING CONTRIBUTION TO CARE
Brief HPI:  52-year-old female past medical history of diabetes, bilateral proliferative vitreoretinopathy, presents with floaters and decreased vision in left eye since last night.  Symptoms occurred from watching TV.  Denies trauma.  Denies pain or headache.  She does not wear contact lenses.    Vitals:   Reviewed    Exam:    GEN:  Non-toxic appearing, non-distressed, speaking full sentences, non-diaphoretic, AAOx3  HEENT:  NCAT, neck supple, no stridor, normal voice, no tonsillar exudate, uvula midline  EYE:  see resident note   CV:  regular rhythm and rate, s1/s2 audible, no murmurs, rubs or gallops, peripheral pulses 2+ and symmetric  PULM:  non-labored respirations, lungs clear to auscultation bilaterally, no wheezes, crackles or rales  ABD:  non distended, non-tender, no rebound, no guarding, negative Hsu's sign, bowel sounds normal, no cvat  MSK:  no gross deformity, non-tender extremities and joints, range of motion grossly normal appearing, no extremity edema, extremities warm and well perfused   NEURO:  AAOx3, CN II-XII intact, motor 5/5 in upper and lower extremities bilaterally, sensation grossly intact in extremities and trunk  SKIN:  warm, dry, no rash or vesicles     A/P:  52-year-old female past medical history of diabetes, bilateral proliferative vitreoretinopathy, presents with floaters and decreased vision in left eye since last night. VSS AF.  Exam non-toxic appearing.  Decreased acuity with field loss in left eye.  Possible vitreous hemorrhage given vasculopathy.  Low c/f acute closed angle glaucoma.  Plan for optho consult.  Dispo pending.

## 2023-01-28 NOTE — ED PROVIDER NOTE - NSPTACCESSSVCSAPPTDETAILS_ED_ALL_ED_FT
Pt knows to present to ophtho clinic on Monday, clinic will also reach out.    Please follow up with PCP in order to obtain authorization for "free style chas 2 sensor" as well as any home care services if she qualifies.

## 2023-01-28 NOTE — ED ADULT TRIAGE NOTE - CHIEF COMPLAINT QUOTE
pt woke up and was seeing floating things in front of eye  pt is diabetic and loss vision in rt eye 3 yrs ago   today left eye vision problem states I feel like I am just seeing the silhouettes of people pt woke up and was seeing floating things in front of eye  pt is diabetic and loss vision in rt eye 3 yrs ago   today left eye vision problem states I feel like I am just seeing the silhouettes of people   pt admits it started yesterday evening the eye become dark

## 2023-01-28 NOTE — ED PROVIDER NOTE - PHYSICAL EXAMINATION
General: NAD, non-toxic appearing   HEENT: PERRLA, EOMi without pain, no scleral icterus, complete vision loss in R eye, 20/200 in L eye, visual fields unable  CV: RRR, normal S1 and S2  Lungs: normal respiratory effort on RA, CTAB  Abd: soft, nontender, nondistended  Ext: no edema, 2+ peripheral pulses   Pysch: AAOx3, appropriate affect   Neuro: grossly non-focal, moving all extremities spontaneously   Skin: no rashes or lesions

## 2023-01-28 NOTE — ED ADULT NURSE NOTE - CHIEF COMPLAINT QUOTE
pt woke up and was seeing floating things in front of eye  pt is diabetic and loss vision in rt eye 3 yrs ago   today left eye vision problem states I feel like I am just seeing the silhouettes of people   pt admits it started yesterday evening the eye become dark

## 2023-01-28 NOTE — ED PROVIDER NOTE - CARE PROVIDER_API CALL
., .  [place] [facility] Op[facility]  [address], [address]  Phone: (   )    -  Fax: (   )    -  Follow Up Time:

## 2023-01-28 NOTE — CONSULT NOTE ADULT - SUBJECTIVE AND OBJECTIVE BOX
[facility] DEPARTMENT OF OPHTHALMOLOGY - INITIAL ADULT CONSULT  -----------------------------------------------------------------------------------------------------------------  Branden Andres MD PGY 3  [phone]  -----------------------------------------------------------------------------------------------------------------    HPI: 52F with history of insulin dependent T2DM, recent evaluation in ER in Nov 2022 with findings of PVR secondary to PDR, chronically poor vision in the right eye. Patient reports getting cataract surgeries with a doctor in [place] followed by deterioration of her vision in both eyes. Reports right eye has been blind for years. Left eye acutely worsened today with associated floaters which brought her to the hospital. Denies loss of vision, headaches, scalp tenderness, shoulder pain, jaw claudication.     PAST MEDICAL & SURGICAL HISTORY:  DM2 (diabetes mellitus, type 2)      HTN (hypertension)      No significant past surgical history        Past Ocular History: PVR OU,PCIOL OU  Ophthalmic Medications: none  FAMILY HISTORY:    Social History: denies etoh/tobacco    MEDICATIONS  (STANDING):    MEDICATIONS  (PRN):    Allergies & Intolerances:     Review of Systems:  Constitutional: No fever, chills  Eyes: No blurry vision, flashes, floaters, FBS, erythema, discharge, double vision, OU  Neuro: No tremors  Cardiovascular: No chest pain, palpitations  Respiratory: No SOB, no cough  GI: No nausea, vomiting, abdominal pain  : No dysuria  Skin: no rash  Psych: no depression  Endocrine: no polyuria, polydipsia  Heme/lymph: no swelling    VITALS: T(C): 36.7 (01-28-23 @ 14:23)  T(F): 98 (01-28-23 @ 14:23), Max: 98 (01-28-23 @ 14:23)  HR: 98 (01-28-23 @ 14:23) (98 - 98)  BP: 142/87 (01-28-23 @ 14:23) (142/87 - 142/87)  RR:  (16 - 16)  SpO2:  (100% - 100%)  Wt(kg): --  General: AAO x 3, appropriate mood and affect    Ophthalmology Exam:  Visual acuity (sc):  LP OD, 20/400 OS NIPH  Pupils: OD: peaked inferiorly. reactive. OS: Round reactibe. +APD right  Ttono: 16 OD, 16 OS  Extraocular movements (EOMs): Full OU, no pain, no diplopia      Pen Light Exam (PLE)  External: Flat OU  Lids/Lashes/Lacrimal Ducts: Flat OU    Sclera/Conjunctiva: W+Q OU  Cornea: Cl OU  Anterior Chamber: D+F OU    Iris: Flat OU  Lens: PCIOL OU    Fundus Exam: dilated with 1% tropicamide and 2.5% phenylephrine  Approval obtained from primary team for dilation  Patient aware that pupils can remained dilated for at least 4-6 hours  Exam performed with 20D lens    *** [facility] DEPARTMENT OF OPHTHALMOLOGY - INITIAL ADULT CONSULT  -----------------------------------------------------------------------------------------------------------------  Branden Andres MD PGY 3  [phone]  -----------------------------------------------------------------------------------------------------------------    HPI: 52F with history of insulin dependent T2DM, recent evaluation in ER in Nov 2022 with findings of PVR secondary to PDR, chronically poor vision in the right eye. Patient reports getting cataract surgeries with a doctor in [place] followed by deterioration of her vision in both eyes. Reports right eye has been blind for years. Left eye acutely worsened today with associated floaters which brought her to the hospital. Denies loss of vision, headaches, scalp tenderness, shoulder pain, jaw claudication.     PAST MEDICAL & SURGICAL HISTORY:  DM2 (diabetes mellitus, type 2)      HTN (hypertension)      No significant past surgical history        Past Ocular History: PVR OU,PCIOL OU  Ophthalmic Medications: none  FAMILY HISTORY:    Social History: denies etoh/tobacco    MEDICATIONS  (STANDING):    MEDICATIONS  (PRN):    Allergies & Intolerances:     Review of Systems:  Constitutional: No fever, chills  Eyes: No blurry vision, flashes, floaters, FBS, erythema, discharge, double vision, OU  Neuro: No tremors  Cardiovascular: No chest pain, palpitations  Respiratory: No SOB, no cough  GI: No nausea, vomiting, abdominal pain  : No dysuria  Skin: no rash  Psych: no depression  Endocrine: no polyuria, polydipsia  Heme/lymph: no swelling    VITALS: T(C): 36.7 (01-28-23 @ 14:23)  T(F): 98 (01-28-23 @ 14:23), Max: 98 (01-28-23 @ 14:23)  HR: 98 (01-28-23 @ 14:23) (98 - 98)  BP: 142/87 (01-28-23 @ 14:23) (142/87 - 142/87)  RR:  (16 - 16)  SpO2:  (100% - 100%)  Wt(kg): --  General: AAO x 3, appropriate mood and affect    Ophthalmology Exam:  Visual acuity (sc):  LP OD, 20/400 OS NIPH  Pupils: OD: peaked inferiorly. reactive. OS: Round reactibe. +APD right  Ttono: 16 OD, 16 OS  Extraocular movements (EOMs): Full OU, no pain, no diplopia      Pen Light Exam (PLE)  External: Flat OU  Lids/Lashes/Lacrimal Ducts: Flat OU    Sclera/Conjunctiva: W+Q OU  Cornea: Cl OU  Anterior Chamber: D+F OU    Iris: Flat OU  Lens: PCIOL OU    Fundus Exam: dilated with 1% tropicamide and 2.5% phenylephrine  Approval obtained from primary team for dilation  Patient aware that pupils can remained dilated for at least 4-6 hours  Exam performed with 20D lens    Vitreous: fibrotic contractile membranes OU  Disc, cup/disc: sharp margins, poorly visualized OU  Macula: OD: Tractional Retinal detachment. Fibrovascular tissue with NVE. Pre-retinal heme in periphery. OS: Fibrovascular tissues with NVE. Pre-retinal heme inferiorly. Fresh vitreous hemorrhage centrally   Vessels: sclerotic vessels OU  Periphery: Fibrovascular tissue with scattered Blot heme.

## 2023-01-28 NOTE — ED PROVIDER NOTE - NSFOLLOWUPINSTRUCTIONS_ED_ALL_ED_FT
Retinal Hemorrhage    WHAT YOU NEED TO KNOW:    What is retinal hemorrhage? Retinal hemorrhage is bleeding from the blood vessels in the retina, inside your eye. Your retina is the thin layer that lines the back of your eye.  Eye Anatomy         What causes retinal hemorrhage?   •Medical conditions, such as diabetes, high blood pressure, anemia, or leukemia      •Eye problems, such as macular degeneration, or a bulging of the blood vessels in the retina      •Head trauma caused by car accidents, or child abuse      •Rapid change in air pressure, such as in mountain climbing, or scuba diving      What are the signs and symptoms of retinal hemorrhage? You may have no symptoms. You may have a sudden or gradual loss of vision, ranging from mild to severe. You may have blind spots.    How is retinal hemorrhage diagnosed?   •Blood tests may show information about your overall health. They may also show if you have a medical condition that caused your retinal hemorrhage.      •Vision tests may be done to check how well you see straight ahead, off to the sides, and at different distances.      •Fluorescein angiography may be used to take a picture of the inside of your eye. This test uses a dye that is injected into a vein in your hand or arm. The dye flows into the blood vessels of your retina, so your healthcare provider can see it clearly.      •Ultrasound may be used to show the bleeding inside your eye. Ultrasound uses sound waves to show pictures on a monitor.      How is retinal hemorrhage treated? You may not need treatment, because a retinal hemorrhage often heals by itself. If your bleeding is caused by a medical condition, your healthcare provider will treat that illness. You may need any of the following:   •Steroid medicine may be given if you have macular degeneration.      •Laser treatment may be used to stop the bleeding.             What can I do to protect my vision? Your healthcare provider will tell you when it is okay to do your regular activities if you receive treatment. He or she will tell you if you need to change or stop any activity that may damage your retina. The following can help you build and keep eye health:  •Manage health conditions that can cause vision problems. Common examples include diabetes, high blood pressure, and high cholesterol. Follow up with healthcare providers who manage these conditions.      •Wear sunglasses with ultraviolet (UV) light protection. UV light from the sun can damage your eyes. It can increase your risk for vision loss.      •Eat foods that contain eye-healthy nutrients. Healthy nutrients include vitamin A, vitamin C, vitamin E, omega-3 fatty acids, lutein, and zeaxanthin. They can be found in foods such as spinach, peanuts, salmon, kwan greens, avocados, squash, eggs, and blueberries. Ask your healthcare provider for a full list of foods that contain eye-healthy nutrients. You may also need to take a vitamin or supplement to help you get enough of these nutrients.  Eye Health Nutrition           •Exercise as directed. Ask your healthcare provider about the best exercise plan for you.  [employer] Walking for Exercise           •Do not smoke. Nicotine can damage blood vessels in your eyes. Do not use e-cigarettes or smokeless tobacco in place of cigarettes or to help you quit. They still contain nicotine. Ask your healthcare provider for information if you currently smoke and need help to quit.      When should I seek immediate care?   •You cannot see, or your vision is greatly reduced.          When should I call my doctor?   •Your vision does not improve.      •You develop new vision problems, such as a lazy eye, or blind spots.      •You have questions or concerns about your condition or care.      CARE AGREEMENT:    You have the right to help plan your care. Learn about your health condition and how it may be treated. Discuss treatment options with your healthcare providers to decide what care you want to receive. You always have the right to refuse treatment

## 2023-01-28 NOTE — ED PROVIDER NOTE - PROVIDER TOKENS
FREE:[LAST:[.],FIRST:[.],PHONE:[(   )    -],FAX:[(   )    -],ADDRESS:[[place] [facility]  [address] [place] [address], [address]]]

## 2023-01-28 NOTE — CONSULT NOTE ADULT - ASSESSMENT
Assessment and Recommendations:  52y female with a past medical history insulin-dependent diabetes and ocular history of PCIOL OU and PVR OU, ophthalmology consulted for deterioration of chronically poor vision    # Proliferative Vitreoretinopathy with retinal hemorrhages OU  - Right eye at baseline; left eye 1 line worse than last admission in Nov 2022  - Dilated exam unchanged in the right eye. Left shows ***  - Retinal findings likely all secondary to uncontrolled diabetes. 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   - Patient will need continued intravitreal anti-VEGF injections as an outpatient to control retinal hemorrhage and curb neovascularization. Further evaluation needed to determine need for surgery  - Patient understands to follow-up at [facility] for further evaluation    Outpatient Follow-up: Patient should follow-up with his/her ophthalmologist or with [facility] within 1 week of after discharge at:    [address] [facility] [address], [address], [id number] Assessment and Recommendations:  52y female with a past medical history insulin-dependent diabetes and ocular history of PCIOL OU and PVR OU, ophthalmology consulted for deterioration of chronically poor vision    # Proliferative Vitreoretinopathy with retinal hemorrhages OU  - Right eye at baseline; left eye 1 line worse than last admission in Nov 2022  - Dilated exam unchanged in the right eye. Left shows fresh vitreous hemorrhage; both eyes affected by proliferative vitreoretinopathy   - Retinal findings likely all secondary to uncontrolled diabetes. 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   - Patient will need continued intravitreal anti-VEGF injections as an outpatient to control retinal hemorrhage and curb neovascularization. Further evaluation needed to determine need for surgery  - HOB elevation  - Avoid ASA/NSAID unless medically necessary   - Patient understands to follow-up at [facility] for further evaluation for laser vs injection vs surgery     Outpatient Follow-up: Patient should follow-up with his/her ophthalmologist or with [place] [facility] Op[facility]mology within 1 week of after discharge at:    [address] [facility] [place], [place] [id number] [place] [address], 4th floor, [id number]

## 2023-01-28 NOTE — PROVIDER CONTACT NOTE (OTHER) - ASSESSMENT
Pt was referred to ED CM by Dr. Sharif for initial assessment and potential home assistance, Met with Pt at the bedside, Pt with deterioration of her vision in both eyes, she stated her insurance coverage from her job has been terminated last month and currently she has [employer], She used to control glucose levels at home with FreeStyle Anthony 2 system, however, [facility] does not cover the system and she has a new glucometer but she not able to read correctly the numbers. CM explained the [employer] coverage process and the fact that her PCP- Dr. Kate Tate needs to obtain Auth. for the noted device. Also, recommended to follow-up with PCP to start the process for aide. Pt was referred to ED CM by Dr. Sharif for initial assessment and potential home assistance, Met with Pt at the bedside, Pt with deterioration of her vision in both eyes, she stated her insurance coverage from her job has been terminated last month and currently she has [facility], She used to control glucose levels at home with FreeStyle Anthony 2 system, however, [facility] did not cover the noted system and she uses a new glucometer and has difficulty to read correctly the numbers. CM explained the [facility] coverage process and the fact that her PCP- Dr. Kate Tate needs to obtain Auth. for the noted device from [facility].   Also, CM recommended to follow-up with PCP to complete M11Q form and [employer] to start the process for home aide through [facility]. Pt was referred to ED CM by Dr. Sharif for initial assessment and potential home assistance, Met with Pt at the bedside, Pt with deterioration of her vision in both eyes, she stated her insurance coverage from her job has been terminated last month and currently she has [facility], She used to control glucose levels at home with FreeStyle Anthony 2 system, however, [facility] did not cover the noted system and she uses a new glucometer and has difficulty to read correctly . CM explained the [facility] coverage process and the fact that her PCP- Dr. Kate Tate needs to obtain Auth. for the noted device from [facility].   Also, CM recommended to follow-up with PCP to complete M11Q form and [employer] to start the process for home aide through [facility].

## 2023-01-28 NOTE — ED PROVIDER NOTE - CARE PLAN
1 Principal Discharge DX:	Vitreous hemorrhage, left eye  Secondary Diagnosis:	Proliferative vitreoretinopathy, both eyes  Secondary Diagnosis:	T2DM (type 2 diabetes mellitus)

## 2023-01-28 NOTE — ED ADULT NURSE NOTE - OBJECTIVE STATEMENT
Pt received in spot 28. A&Ox4. Pt comes into ED after a change in vision in her L eye. Pt reports at baseline she is unable to see out of R eye and L eye is blurry. However this AM vision in L eye became worse and pt reports seeing more "specks". NSR on monitor. Radial pulses +2 bilaterally. Respirations even and unlabored. No abdominal distension noted. Pt reports pain in R lumbar region of abdomen upon palpation. No swelling noted in pt legs. Pedal pulses +2 bilaterally. Pt denies chest pain, N/V, dysuria. Phmx of DM and HTN. Pt received in spot 28. A&Ox4. Pt comes into ED after a change in vision in her L eye. Pt reports at baseline she is unable to see out of R eye and L eye is blurry. However this AM vision in L eye became worse and pt reports seeing more "specks". Pupils non reactive to light. NSR on monitor. Radial pulses +2 bilaterally. Respirations even and unlabored. No abdominal distension noted. Pt reports pain in R lumbar region of abdomen upon palpation. No swelling noted in pt legs. Pedal pulses +2 bilaterally. Pt denies chest pain, N/V, dysuria. Phmx of DM and HTN.

## 2023-01-28 NOTE — PROVIDER CONTACT NOTE (OTHER) - ASSESSMENT
Medical team informed pt has been medically cleared and needs taxi to get home. Writer contacted [facility] [phone] and spoke to Ms. Chiang and  invoice # [id number] with [employer]. Writer contacted [employer] [phone] and spoke with Evangelista and malaika came and  the pt in ED ramp. medical team was made aware. no further SW intervention needed for this visit.

## 2023-01-28 NOTE — PROVIDER CONTACT NOTE (OTHER) - ACTION/TREATMENT ORDERED:
M11Q application and [employer] office information provided, Pt will follow with PCP, as per Pt request d/c notes faxed to PCP office as well. M11Q application and [employer] office information provided, Pt will follow with PCP. As per Pt request d/c notes faxed to PCP office as well. M11Q application and [facility] office information provided, Pt will follow with PCP. ED attending notified.   As per Pt's request d/c notes faxed to PCP office as well.

## 2023-02-22 ENCOUNTER — EMERGENCY (EMERGENCY)
Facility: HOSPITAL | Age: 53
LOS: 0 days | Discharge: HOME | End: 2023-02-23
Attending: STUDENT IN AN ORGANIZED HEALTH CARE EDUCATION/TRAINING PROGRAM
Payer: MEDICAID

## 2023-02-22 VITALS
WEIGHT: 160.06 LBS | DIASTOLIC BLOOD PRESSURE: 99 MMHG | RESPIRATION RATE: 20 BRPM | HEIGHT: 62 IN | HEART RATE: 108 BPM | SYSTOLIC BLOOD PRESSURE: 176 MMHG | TEMPERATURE: 98 F | OXYGEN SATURATION: 96 %

## 2023-02-22 DIAGNOSIS — E78.5 HYPERLIPIDEMIA, UNSPECIFIED: ICD-10-CM

## 2023-02-22 DIAGNOSIS — Z79.4 LONG TERM (CURRENT) USE OF INSULIN: ICD-10-CM

## 2023-02-22 DIAGNOSIS — K59.00 CONSTIPATION, UNSPECIFIED: ICD-10-CM

## 2023-02-22 DIAGNOSIS — R42 DIZZINESS AND GIDDINESS: ICD-10-CM

## 2023-02-22 DIAGNOSIS — E11.9 TYPE 2 DIABETES MELLITUS WITHOUT COMPLICATIONS: ICD-10-CM

## 2023-02-22 DIAGNOSIS — R00.2 PALPITATIONS: ICD-10-CM

## 2023-02-22 DIAGNOSIS — I10 ESSENTIAL (PRIMARY) HYPERTENSION: ICD-10-CM

## 2023-02-22 LAB
ALBUMIN SERPL ELPH-MCNC: 3.5 G/DL — SIGNIFICANT CHANGE UP (ref 3.3–5)
ALP SERPL-CCNC: 105 U/L — SIGNIFICANT CHANGE UP (ref 40–120)
ALT FLD-CCNC: 21 U/L — SIGNIFICANT CHANGE UP (ref 12–78)
ANION GAP SERPL CALC-SCNC: 8 MMOL/L — SIGNIFICANT CHANGE UP (ref 5–17)
APPEARANCE UR: CLEAR — SIGNIFICANT CHANGE UP
AST SERPL-CCNC: 13 U/L — LOW (ref 15–37)
BASOPHILS # BLD AUTO: 0.03 K/UL — SIGNIFICANT CHANGE UP (ref 0–0.2)
BASOPHILS NFR BLD AUTO: 0.4 % — SIGNIFICANT CHANGE UP (ref 0–2)
BILIRUB SERPL-MCNC: 0.1 MG/DL — LOW (ref 0.2–1.2)
BILIRUB UR-MCNC: NEGATIVE — SIGNIFICANT CHANGE UP
BUN SERPL-MCNC: 21 MG/DL — SIGNIFICANT CHANGE UP (ref 7–23)
CALCIUM SERPL-MCNC: 9.2 MG/DL — SIGNIFICANT CHANGE UP (ref 8.5–10.1)
CHLORIDE SERPL-SCNC: 102 MMOL/L — SIGNIFICANT CHANGE UP (ref 96–108)
CO2 SERPL-SCNC: 27 MMOL/L — SIGNIFICANT CHANGE UP (ref 22–31)
COLOR SPEC: YELLOW — SIGNIFICANT CHANGE UP
CREAT SERPL-MCNC: 0.89 MG/DL — SIGNIFICANT CHANGE UP (ref 0.5–1.3)
DIFF PNL FLD: NEGATIVE — SIGNIFICANT CHANGE UP
EGFR: 78 ML/MIN/1.73M2 — SIGNIFICANT CHANGE UP
EOSINOPHIL # BLD AUTO: 0.11 K/UL — SIGNIFICANT CHANGE UP (ref 0–0.5)
EOSINOPHIL NFR BLD AUTO: 1.5 % — SIGNIFICANT CHANGE UP (ref 0–6)
GLUCOSE BLDC GLUCOMTR-MCNC: 154 MG/DL — HIGH (ref 70–99)
GLUCOSE SERPL-MCNC: 139 MG/DL — HIGH (ref 70–99)
GLUCOSE UR QL: NEGATIVE MG/DL — SIGNIFICANT CHANGE UP
HCG SERPL-ACNC: 1 MIU/ML — SIGNIFICANT CHANGE UP
HCT VFR BLD CALC: 36.6 % — SIGNIFICANT CHANGE UP (ref 34.5–45)
HGB BLD-MCNC: 11.6 G/DL — SIGNIFICANT CHANGE UP (ref 11.5–15.5)
IMM GRANULOCYTES NFR BLD AUTO: 0.3 % — SIGNIFICANT CHANGE UP (ref 0–0.9)
KETONES UR-MCNC: NEGATIVE — SIGNIFICANT CHANGE UP
LEUKOCYTE ESTERASE UR-ACNC: NEGATIVE — SIGNIFICANT CHANGE UP
LYMPHOCYTES # BLD AUTO: 1.94 K/UL — SIGNIFICANT CHANGE UP (ref 1–3.3)
LYMPHOCYTES # BLD AUTO: 26.6 % — SIGNIFICANT CHANGE UP (ref 13–44)
MCHC RBC-ENTMCNC: 26.8 PG — LOW (ref 27–34)
MCHC RBC-ENTMCNC: 31.7 G/DL — LOW (ref 32–36)
MCV RBC AUTO: 84.5 FL — SIGNIFICANT CHANGE UP (ref 80–100)
MONOCYTES # BLD AUTO: 0.69 K/UL — SIGNIFICANT CHANGE UP (ref 0–0.9)
MONOCYTES NFR BLD AUTO: 9.5 % — SIGNIFICANT CHANGE UP (ref 2–14)
NEUTROPHILS # BLD AUTO: 4.49 K/UL — SIGNIFICANT CHANGE UP (ref 1.8–7.4)
NEUTROPHILS NFR BLD AUTO: 61.7 % — SIGNIFICANT CHANGE UP (ref 43–77)
NITRITE UR-MCNC: NEGATIVE — SIGNIFICANT CHANGE UP
NRBC # BLD: 0 /100 WBCS — SIGNIFICANT CHANGE UP (ref 0–0)
PH UR: 6 — SIGNIFICANT CHANGE UP (ref 5–8)
PLATELET # BLD AUTO: 216 K/UL — SIGNIFICANT CHANGE UP (ref 150–400)
POTASSIUM SERPL-MCNC: 3.8 MMOL/L — SIGNIFICANT CHANGE UP (ref 3.5–5.3)
POTASSIUM SERPL-SCNC: 3.8 MMOL/L — SIGNIFICANT CHANGE UP (ref 3.5–5.3)
PROT SERPL-MCNC: 7.5 GM/DL — SIGNIFICANT CHANGE UP (ref 6–8.3)
PROT UR-MCNC: NEGATIVE MG/DL — SIGNIFICANT CHANGE UP
RBC # BLD: 4.33 M/UL — SIGNIFICANT CHANGE UP (ref 3.8–5.2)
RBC # FLD: 13 % — SIGNIFICANT CHANGE UP (ref 10.3–14.5)
SODIUM SERPL-SCNC: 137 MMOL/L — SIGNIFICANT CHANGE UP (ref 135–145)
SP GR SPEC: 1.01 — SIGNIFICANT CHANGE UP (ref 1.01–1.02)
TROPONIN I, HIGH SENSITIVITY RESULT: 8.6 NG/L — SIGNIFICANT CHANGE UP
TSH SERPL-MCNC: 1.07 UIU/ML — SIGNIFICANT CHANGE UP (ref 0.36–3.74)
UROBILINOGEN FLD QL: NEGATIVE MG/DL — SIGNIFICANT CHANGE UP
WBC # BLD: 7.28 K/UL — SIGNIFICANT CHANGE UP (ref 3.8–10.5)
WBC # FLD AUTO: 7.28 K/UL — SIGNIFICANT CHANGE UP (ref 3.8–10.5)

## 2023-02-22 PROCEDURE — 99285 EMERGENCY DEPT VISIT HI MDM: CPT

## 2023-02-22 PROCEDURE — 93010 ELECTROCARDIOGRAM REPORT: CPT | Mod: 76

## 2023-02-22 RX ORDER — SODIUM CHLORIDE 9 MG/ML
1000 INJECTION INTRAMUSCULAR; INTRAVENOUS; SUBCUTANEOUS ONCE
Refills: 0 | Status: COMPLETED | OUTPATIENT
Start: 2023-02-22 | End: 2023-02-22

## 2023-02-22 RX ADMIN — SODIUM CHLORIDE 1000 MILLILITER(S): 9 INJECTION INTRAMUSCULAR; INTRAVENOUS; SUBCUTANEOUS at 20:40

## 2023-02-22 NOTE — ED PROVIDER NOTE - CLINICAL SUMMARY MEDICAL DECISION MAKING FREE TEXT BOX
Pt here with PMH DM, chronic vision loss, HTN, HLD, here with episode of palpitations and dizziness after injecting insulin without checking fsg. FSG on arrival 150s without intervention. EKG 96 BPM NSR without ischemic change or arrhythmia. Pt currently asymptomatic. No LOC or syncope. On ROS pt also mentions R flank pain and constipation. NO CVA tendreness. NO UTI on UA to suggest uti/pyelo causing constipation. No n/v, low suspicion SBO. TSH, electrolytes, fsg, CR wnl. Pending ct a/p for dispo.

## 2023-02-22 NOTE — ED PROVIDER NOTE - IV ALTEPLASE EXCL REL HIDDEN
Subjective     Avis Paul is a 55 year old female who presents to clinic today for the following health issues:    HPI           Concern for COVID-19  About how many days ago did these symptoms start? X 3 weeks but fever was in the last 5 days. Patient does get allergies from cats and they did have a cat in the house in October/ November for three weeks.   Is this your first visit for this illness? Yes  In the 14 days before your symptoms started, have you had close contact with someone with COVID-19 (Coronavirus)? No  Do you have a fever or chills? Yes, the highest temperature was 103.  started 12/24  Are you having new or worsening difficulty breathing? Yes   Please describe what kind of difficulty you are having breathing:Mild dyspnea (able to do ADLs without difficulty, mild shortness of breath with activities such as climbing one or two flights of stairs or walking briskly)  Do you have new or worsening cough? Yes. Coughing up phlegm or mucus.  Have you had any new or unexplained body aches? No    Have you experienced any of the following NEW symptoms?    Headache: No    Sore throat: YES    Loss of taste or smell: No    Chest pain: No    Diarrhea: YES - 5-6 times a day. 3 weeks    Rash: No  What treatments have you tried? Tylenol and Singulair and Claritin helped a little bit.  Who do you live with?  and daughter  Are you, or a household member, a healthcare worker or a ? No  Do you live in a nursing home, group home, or shelter? No  Do you have a way to get food/medications if quarantined? Yes, I have a friend or family member who can help me.    When this started did have a kitten in the house. And has known allergies to cat, so if she brushed up against her would then get hives and shortness of breath, throat closing, and vomiting.    Cat then went in garage.  Then had puppies in the house taking care of them.  Any activity causes shortness of breath, sweating profusely (just  start in the last 1.5weeks).  Night sweats and fever started 5 days ago lasting 2-3 hours from 2-5 am.     Does not leave the home.   and daughter do shopping and they are the only two she sees, they have no symptoms. Does have a high schooler who could have brought something home.      Review of Systems   Constitutional, HEENT, cardiovascular, pulmonary, gi and gu systems are negative, except as otherwise noted.      Objective    Pulse 107   Temp 100.4  F (38  C) (Tympanic)   SpO2 94%   There is no height or weight on file to calculate BMI.  Physical Exam   GENERAL: healthy, alert and no distress  HENT: ear canals and TM's normal, nose and mouth without ulcers or lesions  NECK: no adenopathy, no asymmetry, masses, or scars and thyroid normal to palpation  RESP: lungs clear to auscultation - no rales, rhonchi or wheezes  CV: regular rate and rhythm, normal S1 S2, no S3 or S4, no murmur, click or rub, no peripheral edema and peripheral pulses strong  ABDOMEN: soft, nontender, no hepatosplenomegaly, no masses and bowel sounds normal  MS: no gross musculoskeletal defects noted, no edema  SKIN: no suspicious lesions or rashes  LYMPH: no cervical, or supraclavicular adenopathy    No results found for this or any previous visit (from the past 24 hour(s)).        Assessment & Plan     Avis was seen today for covid concern.    Diagnoses and all orders for this visit:    Lymphoma, mantle cell, multiple sites (H)    Cough: coccidiomycosis concern and COVID concern  Has new puppies.  At this time normal O2 sat, normal lung sounds, no need for Xray unless symptoms worsen.  -     Symptomatic COVID-19 Virus (Coronavirus) by PCR; Future    Fever, unspecified fever cause: many potential reasons for fever including cancer recurrance rue ellie with blood counts, fever and diarrhea could be COVID, cdiff, stool infection from these new puppies.    -     Symptomatic COVID-19 Virus (Coronavirus) by PCR; Future  -     Enteric  "Bacteria and Virus Panel by [facility]RI[facility] Stool  -     Cryptosporidium/Giardia Immunoassay  -     Coccidioides Agn Quant EIA Blood  -     CBC with platelets and differential  -     Comprehensive metabolic panel (BMP + Alb, Alk Phos, ALT, AST, Total. Bili, TP)  -     Clostridium difficile Toxin B PCR    Shortness of breath    Suspected COVID-19 virus infection  -     Symptomatic COVID-19 Virus (Coronavirus) by PCR; Future    Diarrhea, unspecified type  -     Enteric Bacteria and Virus Panel by ENRIQUE Stool  -     Cryptosporidium/Giardia Immunoassay  -     Clostridium difficile Toxin B PCR            Patient Instructions   Power port access 1 inch needle for draw and heparin.    I'll MyChart with results and next steps.  If you  Illness worsens, ok to go to ER.    Instructions for Patients  After Your COVID-19 (Coronavirus) Test  You have been tested for COVID-19 (coronavirus).   If you'll have surgery in the next few days, we'll let you know ahead of time if you have the virus. Please call your surgeon's office with any questions.  For all other patients: Results are usually available in [employer] within 2 to 3 days.   If you do not have a [employer] account, you'll get a letter in the mail in about 7 to 10 days.   [employer] is often the fastest way to get test results. Please sign up if you do not already have a [employer] account. See the handout Getting COVID-19 Test Results in [employer] for help.  What if my test result is positive?  If your test is positive and you have not viewed your result in [employer], you'll get a phone call with your result. (A positive test means that you have the virus.)     Follow the tips under \"How do I self-isolate?\" below for 10 days (20 days if you have a weak immune system).    You don't need to be retested for COVID-19 before going back to school or work. As long as you're fever-free and feeling better, you can go back to school, work and other activities after waiting the 10 or 20 days.  What if I have " "questions after I get my results?  If you have questions about your results, please visit our testing website at [email or web address].   After 7 to 10 days, if you have not gotten your results:     Call [phone] ([phone]) and ask to speak with our COVID-19 results team.    If you're being treated at an infusion center: Call your infusion center directly.  What are the symptoms of COVID-19?  Cough, fever and trouble breathing are the most common signs of COVID-19.  Other symptoms can include new headaches, new muscle or body aches, new and unexplained fatigue (feeling very tired), chills, sore throat, congestion (stuffy or runny nose), diarrhea (loose poop), loss of taste or smell, belly pain, and nausea or vomiting (feeling sick to your stomach or throwing up).  You may already have symptoms of COVID-19, or they may show up later.  What should I do if I have symptoms?  If you're having surgery: Call your surgeon's office.  For all other patients: Stay home and away from others (self-isolate) until ...    You've had no fever--and no medicine that reduces fever--for 1 full day (24 hours), AND    Other symptoms have gotten better. For example, your cough or breathing has improved, AND    At least 10 days have passed since your symptoms first started.  How do I self-isolate?    Stay in your own room, even for meals. Use your own bathroom if you can.    Stay away from others in your home. No hugging, kissing or shaking hands. No visitors.    Don't go to work, school or anywhere else.    Clean \"high touch\" surfaces often (doorknobs, counters, handles). Use household cleaning spray or wipes. You'll find a full list of  on the EPA website: www.epa.gov/pesticide-registration/list-n-disinfectants-use-against-sars-cov-2.    Cover your mouth and nose with a mask or other face covering to avoid spreading germs.    Wash your hands and face often. Use soap and water.    Caregivers " in these groups are at risk for severe illness due to COVID-19:  ? People 65 years and older  ? People who live in a nursing home or long-term care facility  ? People with chronic disease (lung, heart, cancer, diabetes, kidney, liver, immunologic)  ? People who have a weakened immune system, including those who:    Are in cancer treatment    Take medicine that weakens the immune system, such as corticosteroids    Had a bone marrow or organ transplant    Have an immune deficiency    Have poorly controlled HIV or AIDS    Are obese (body mass index of 40 or higher)    Smoke regularly    Caregivers should wear gloves while washing dishes, handling laundry and cleaning bedrooms and bathrooms.    Use caution when washing and drying laundry: Don't shake dirty laundry and use the warmest water setting that you can.    For more tips on managing your health at home, go to www.cdc.gov/coronavirus/2019-ncov/downloads/10Things.pdf.  How can I take care of myself at home?  1. Get lots of rest. Drink extra fluids (unless a doctor has told you not to).  2. Take Tylenol (acetaminophen) for fever or pain. If you have liver or kidney problems, ask your family doctor if it's OK to take Tylenol.   Adults can take either:  ? 650 mg (two 325 mg pills) every 4 to 6 hours, or   ? 1,000 mg (two 500 mg pills) every 8 hours as needed.  ? Note: Don't take more than 3,000 mg in one day. Acetaminophen is found in many medicines (both prescribed and over-the-counter medicines). Read all labels to be sure you don't take too much.   For children, check the Tylenol bottle for the right dose. The dose is based on the child's age or weight.  3. If you have other health problems (like cancer, heart failure, an organ transplant or severe kidney disease): Call your specialty clinic if you don't feel better in the next 2 days.  4. Know when to call 911. Emergency warning signs include:  ? Trouble breathing or shortness of breath  ? Chest pain or pressure  that doesn't go away  ? Feeling confused like you haven't felt before, or not being able to wake up  ? Bluish-colored lips or face  5. If your doctor prescribed a blood thinner medicine: Follow their instructions.  Where can I get more information?    [facility] - About COVID-19:   [email or web address]    CDC - If You're Sick: cdc.gov/coronavirus/2019-ncov/about/steps-when-sick.html    CDC - Ending Home Isolation: www.cdc.gov/coronavirus/2019-ncov/hcp/disposition-in-home-patients.html    CDC - Caring for Someone: www.cdc.gov/coronavirus/2019-ncov/if-you-are-sick/care-for-someone.html    [facility] - Interim Guidance for Hospital Discharge to Home: www.health.[facility].[place]./diseases/coronavirus/hcp/hospdischarge.pdf    [facility] clinical trials (COVID-19 research studies): clinicalaffairs.[facility]-clinical-trials    Below are the COVID-19 hotlines at the [place] Department of Health ([facility]). Interpreters are available.  ? For health questions: Call 800-735-8524 or 1-717.511.9596 (7 a.m. to 7 p.m.)  ? For questions about schools and childcare: Call 598-318-1258 or [phone] (7 a.m. to 7 p.m.)    For informational purposes only. Not to replace the advice of your health care provider. Clinically reviewed by Infection Prevention and the [facility] COVID-19 Clinical Team. Copyright   2020 [facility]. All rights reserved. [employer] 134127 - Rev 11/11/20.      Thank you for limiting contact with others, wearing a simple mask to cover your cough, practice good hand hygiene habits and accessing our virtual services where possible to limit the spread of this virus.    For more information about COVID19 and options for caring for yourself at home, please visit the CDC website at https://www.cdc.gov/coronavirus/2019-ncov/about/steps-when-sick.html  For more options for care at [facility], please visit our website at [email or web address]        Return in 1 week (on 1/5/2021).    Sunil Tan MD  [facility]     show

## 2023-02-22 NOTE — ED PROVIDER NOTE - NSFOLLOWUPINSTRUCTIONS_ED_ALL_ED_FT
Please follow up with your primary care doctor. Please return to the ED for any concerning symptoms such as chest pain, dizziness, nausea., vomiting, shortness of breath or any other concerning symptoms

## 2023-02-22 NOTE — ED PROVIDER NOTE - OBJECTIVE STATEMENT
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 Pt states was unable to check her fsg before or after as she is currently waiting for a glucometer with bigger screen so she can read the glucose readings on the screen. Pts fsg with EMS 150s without intervention. Similar in ED. Pt denies chest pain, sob, pleurisy, leg pain/swelling. No hx of PE/DVT or thyroid disease. Pts HR currently 90s, EKG 96 bpm NSR. 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 Pt states was unable to check her fsg before or after as she is currently waiting for a glucometer with bigger screen so she can read the glucose readings on the screen. Pts fsg with EMS 150s without intervention. Similar in ED. Pt denies chest pain, sob, pleurisy, leg pain/swelling. No hx of PE/DVT or thyroid disease. Pts HR currently 90s, EKG 96 bpm NSR.

## 2023-02-22 NOTE — ED ADULT NURSE NOTE - OBJECTIVE STATEMENT
Pt received in bed 7, 51 y/o F, A&Ox3, pt presents to the ED c/o palpitations feeling like she was going to pass out approx 1hour ago. Pt c/o of chest pain and R flank pain. Pt states I haven't peed in two weeks, I don't know why" pt states took aspirin 2tabs, and found minimal relief. hx dm, neuropathy, poor vision. NKDA. Pt denies sob, N,V,D, fever, cough, chills. Pt received in bed 7, 53 y/o F, A&Ox3, pt presents to the ED c/o palpitations feeling like she was going to pass out approx 1hour ago. Pt c/o of chest pain and R flank pain. Pt states "I haven't pooped in two weeks, I don't know why" pt states took aspirin 2tabs, and found minimal relief. hx dm, neuropathy, poor vision. NKDA. Pt denies sob, N,V,D, fever, cough, chills.

## 2023-02-22 NOTE — ED PROVIDER NOTE - PATIENT PORTAL LINK FT
You can access the FollowMyHealth Patient Portal offered by [facility] by registering at the following website: [email or web address]. By joining [employer]’s FollowMyHealth portal, you will also be able to view your health information using other applications (apps) compatible with our system.

## 2023-02-22 NOTE — ED PROVIDER NOTE - PROGRESS NOTE DETAILS
Received patient as sign out from [place] team. Patient was pending CT which shows no acute findings. Patient given report of all her lab work and CT findings. Patient was discharged.

## 2023-02-22 NOTE — ED ADULT TRIAGE NOTE - CHIEF COMPLAINT QUOTE
pt presents to the ED c/o palpitations feeling like she was going to pass out approx 1hour ago. hx dm  pt states took aspirin 2tabs, and found minimal relief.

## 2023-02-23 VITALS
TEMPERATURE: 98 F | DIASTOLIC BLOOD PRESSURE: 77 MMHG | SYSTOLIC BLOOD PRESSURE: 140 MMHG | OXYGEN SATURATION: 98 % | RESPIRATION RATE: 12 BRPM | HEART RATE: 85 BPM

## 2023-02-23 LAB
T3 SERPL-MCNC: 102 NG/DL — SIGNIFICANT CHANGE UP (ref 80–200)
T4 AB SER-ACNC: 7.2 UG/DL — SIGNIFICANT CHANGE UP (ref 4.6–12)

## 2023-02-23 PROCEDURE — G1004: CPT

## 2023-02-23 PROCEDURE — 74177 CT ABD & PELVIS W/CONTRAST: CPT | Mod: 26,ME

## 2023-02-23 RX ORDER — POLYETHYLENE GLYCOL 3350 17 G/17G
17 POWDER, FOR SOLUTION ORAL
Qty: 119 | Refills: 0
Start: 2023-02-23 | End: 2023-03-01

## 2023-02-23 RX ADMIN — SODIUM CHLORIDE 1000 MILLILITER(S): 9 INJECTION INTRAMUSCULAR; INTRAVENOUS; SUBCUTANEOUS at 00:15

## 2023-02-23 NOTE — ED ADULT NURSE REASSESSMENT NOTE - NS ED NURSE REASSESS COMMENT FT1
Pt AOx4 and resting comfortably in bed. Pt reports 5 out of 10 flank pain. Pt describes the pain as a dull pain but she can manage it. Will continue to monitor.

## 2023-02-24 LAB
CULTURE RESULTS: SIGNIFICANT CHANGE UP
SPECIMEN SOURCE: SIGNIFICANT CHANGE UP

## 2023-05-10 NOTE — ED ADULT NURSE NOTE - CHIEF COMPLAINT QUOTE
From: Nikolay Cowart  To: Domo Ingram  Sent: 5/9/2023 6:59 AM EDT  Subject: Tick Bite and Rash    I pulled a tick off of my ankle and a symmetrical red oval, almost round shaped rash appeared about 1.5 inches on the long axis. The bite area is in the center. Should I get a prescription for the lymes disease mitigating antibiotic?
Speaking Coherently
pt presents to the ED c/o palpitations feeling like she was going to pass out approx 1hour ago. hx dm  pt states took aspirin 2tabs, and found minimal relief.

## 2023-08-30 ENCOUNTER — EMERGENCY (EMERGENCY)
Facility: HOSPITAL | Age: 53
LOS: 1 days | Discharge: HOME | End: 2023-08-30
Attending: STUDENT IN AN ORGANIZED HEALTH CARE EDUCATION/TRAINING PROGRAM | Admitting: EMERGENCY MEDICINE
Payer: MEDICAID

## 2023-08-30 VITALS
SYSTOLIC BLOOD PRESSURE: 137 MMHG | DIASTOLIC BLOOD PRESSURE: 83 MMHG | OXYGEN SATURATION: 100 % | RESPIRATION RATE: 18 BRPM | TEMPERATURE: 100 F | HEART RATE: 98 BPM

## 2023-08-30 LAB
ALBUMIN SERPL ELPH-MCNC: 4.1 G/DL — SIGNIFICANT CHANGE UP (ref 3.3–5)
ALP SERPL-CCNC: 96 U/L — SIGNIFICANT CHANGE UP (ref 40–120)
ALT FLD-CCNC: 12 U/L — SIGNIFICANT CHANGE UP (ref 4–33)
ANION GAP SERPL CALC-SCNC: 11 MMOL/L — SIGNIFICANT CHANGE UP (ref 7–14)
AST SERPL-CCNC: 16 U/L — SIGNIFICANT CHANGE UP (ref 4–32)
BASE EXCESS BLDV CALC-SCNC: 5.2 MMOL/L — HIGH (ref -2–3)
BASOPHILS # BLD AUTO: 0.04 K/UL — SIGNIFICANT CHANGE UP (ref 0–0.2)
BASOPHILS NFR BLD AUTO: 0.6 % — SIGNIFICANT CHANGE UP (ref 0–2)
BILIRUB SERPL-MCNC: <0.2 MG/DL — SIGNIFICANT CHANGE UP (ref 0.2–1.2)
BLOOD GAS VENOUS COMPREHENSIVE RESULT: SIGNIFICANT CHANGE UP
BUN SERPL-MCNC: 24 MG/DL — HIGH (ref 7–23)
CALCIUM SERPL-MCNC: 9.3 MG/DL — SIGNIFICANT CHANGE UP (ref 8.4–10.5)
CHLORIDE BLDV-SCNC: 102 MMOL/L — SIGNIFICANT CHANGE UP (ref 96–108)
CHLORIDE SERPL-SCNC: 99 MMOL/L — SIGNIFICANT CHANGE UP (ref 98–107)
CO2 BLDV-SCNC: 33.5 MMOL/L — HIGH (ref 22–26)
CO2 SERPL-SCNC: 28 MMOL/L — SIGNIFICANT CHANGE UP (ref 22–31)
CREAT SERPL-MCNC: 1.18 MG/DL — SIGNIFICANT CHANGE UP (ref 0.5–1.3)
EGFR: 56 ML/MIN/1.73M2 — LOW
EOSINOPHIL # BLD AUTO: 0.19 K/UL — SIGNIFICANT CHANGE UP (ref 0–0.5)
EOSINOPHIL NFR BLD AUTO: 3 % — SIGNIFICANT CHANGE UP (ref 0–6)
GAS PNL BLDV: 136 MMOL/L — SIGNIFICANT CHANGE UP (ref 136–145)
GAS PNL BLDV: SIGNIFICANT CHANGE UP
GLUCOSE BLDV-MCNC: 192 MG/DL — HIGH (ref 70–99)
GLUCOSE SERPL-MCNC: 183 MG/DL — HIGH (ref 70–99)
HCO3 BLDV-SCNC: 32 MMOL/L — HIGH (ref 22–29)
HCT VFR BLD CALC: 35.3 % — SIGNIFICANT CHANGE UP (ref 34.5–45)
HCT VFR BLDA CALC: 36 % — SIGNIFICANT CHANGE UP (ref 34.5–46.5)
HGB BLD CALC-MCNC: 11.9 G/DL — SIGNIFICANT CHANGE UP (ref 11.7–16.1)
HGB BLD-MCNC: 11.3 G/DL — LOW (ref 11.5–15.5)
IANC: 3.25 K/UL — SIGNIFICANT CHANGE UP (ref 1.8–7.4)
IMM GRANULOCYTES NFR BLD AUTO: 0.2 % — SIGNIFICANT CHANGE UP (ref 0–0.9)
LACTATE BLDV-MCNC: 1.6 MMOL/L — SIGNIFICANT CHANGE UP (ref 0.5–2)
LIDOCAIN IGE QN: 15 U/L — SIGNIFICANT CHANGE UP (ref 7–60)
LYMPHOCYTES # BLD AUTO: 2.43 K/UL — SIGNIFICANT CHANGE UP (ref 1–3.3)
LYMPHOCYTES # BLD AUTO: 37.8 % — SIGNIFICANT CHANGE UP (ref 13–44)
MCHC RBC-ENTMCNC: 27 PG — SIGNIFICANT CHANGE UP (ref 27–34)
MCHC RBC-ENTMCNC: 32 GM/DL — SIGNIFICANT CHANGE UP (ref 32–36)
MCV RBC AUTO: 84.2 FL — SIGNIFICANT CHANGE UP (ref 80–100)
MONOCYTES # BLD AUTO: 0.51 K/UL — SIGNIFICANT CHANGE UP (ref 0–0.9)
MONOCYTES NFR BLD AUTO: 7.9 % — SIGNIFICANT CHANGE UP (ref 2–14)
NEUTROPHILS # BLD AUTO: 3.25 K/UL — SIGNIFICANT CHANGE UP (ref 1.8–7.4)
NEUTROPHILS NFR BLD AUTO: 50.5 % — SIGNIFICANT CHANGE UP (ref 43–77)
NRBC # BLD: 0 /100 WBCS — SIGNIFICANT CHANGE UP (ref 0–0)
NRBC # FLD: 0 K/UL — SIGNIFICANT CHANGE UP (ref 0–0)
PCO2 BLDV: 55 MMHG — HIGH (ref 39–52)
PH BLDV: 7.37 — SIGNIFICANT CHANGE UP (ref 7.32–7.43)
PLATELET # BLD AUTO: 193 K/UL — SIGNIFICANT CHANGE UP (ref 150–400)
PO2 BLDV: 41 MMHG — SIGNIFICANT CHANGE UP (ref 25–45)
POTASSIUM BLDV-SCNC: 4.9 MMOL/L — SIGNIFICANT CHANGE UP (ref 3.5–5.1)
POTASSIUM SERPL-MCNC: 4.7 MMOL/L — SIGNIFICANT CHANGE UP (ref 3.5–5.3)
POTASSIUM SERPL-SCNC: 4.7 MMOL/L — SIGNIFICANT CHANGE UP (ref 3.5–5.3)
PROT SERPL-MCNC: 7.7 G/DL — SIGNIFICANT CHANGE UP (ref 6–8.3)
RBC # BLD: 4.19 M/UL — SIGNIFICANT CHANGE UP (ref 3.8–5.2)
RBC # FLD: 13.6 % — SIGNIFICANT CHANGE UP (ref 10.3–14.5)
SAO2 % BLDV: 69.7 % — SIGNIFICANT CHANGE UP (ref 67–88)
SODIUM SERPL-SCNC: 138 MMOL/L — SIGNIFICANT CHANGE UP (ref 135–145)
WBC # BLD: 6.43 K/UL — SIGNIFICANT CHANGE UP (ref 3.8–10.5)
WBC # FLD AUTO: 6.43 K/UL — SIGNIFICANT CHANGE UP (ref 3.8–10.5)

## 2023-08-30 PROCEDURE — 99285 EMERGENCY DEPT VISIT HI MDM: CPT

## 2023-08-30 RX ORDER — ONDANSETRON 8 MG/1
4 TABLET, FILM COATED ORAL ONCE
Refills: 0 | Status: COMPLETED | OUTPATIENT
Start: 2023-08-30 | End: 2023-08-30

## 2023-08-30 NOTE — ED ADULT NURSE NOTE - OBJECTIVE STATEMENT
Received 51 y/o female reporting abdominal discomfort "because I haven't been able to have a bowel movement in 10 days. I was on Amoxicillin for 10 days for an infection." Denies any abdominal pain at this time. Denies any nausea or vomiting. Reports able to tolerate food.

## 2023-08-30 NOTE — ED ADULT TRIAGE NOTE - CHIEF COMPLAINT QUOTE
Pt c/o abd pain and constipation x10 days. Last bowel movement 10 days ago. Treated for colon infection about 2 weeks ago. Pt is blind in the R eye and partially blind in the L eye. Hx DM and diverticulitis, HLD. Denies N/V, urinary abnormalities.

## 2023-08-30 NOTE — ED PROVIDER NOTE - PATIENT PORTAL LINK FT
You can access the FollowMyHealth Patient Portal offered by [facility] by registering at the following website: [email or web address]. By joining [employer]’s FollowMyHealth portal, you will also be able to view your health information using other applications (apps) compatible with our system.

## 2023-08-30 NOTE — ED PROVIDER NOTE - NSFOLLOWUPINSTRUCTIONS_ED_ALL_ED_FT
You were seen in the ED for 10 days with bowel movements and abdominal discomfort with nausea. Your CT demonstrated no signs of bowel obstruction but showed a significant amount of stool in your colon consistent with constipation. You likely have constipation chronically and are having an acute episode. Patients who have chronic constipation typically require consistent bowel regimens with over the counter aids like Miralax and Senna. Please maintain a healthy high fiber diet.       1. TAKE ALL MEDICATIONS AS DIRECTED.    2. FOR PAIN OR FEVER YOU CAN TAKE IBUPROFEN (MOTRIN, ADVIL) OR ACETAMINOPHEN (TYLENOL) AS NEEDED, AS DIRECTED ON PACKAGING.  3. FOLLOW UP WITH YOUR PRIMARY DOCTOR WITHIN 5 DAYS AS DIRECTED.  4. IF YOU HAD LABS OR IMAGING DONE, YOU WERE GIVEN COPIES OF ALL LABS AND/OR IMAGING RESULTS FROM YOUR ER VISIT--PLEASE TAKE THEM WITH YOU TO YOUR FOLLOW UP APPOINTMENTS.  5. RETURN TO THE ER FOR ANY WORSENING SYMPTOMS OR CONCERNS.    Please follow up with primary care within one week.    Continue to take your medications as prescribed including your insulin.    Miralax - Take 1 cap full in 4-8oz of liquid. It may take 1-3 days to produce a bowel movement.  Senna - For the oral tablet form take 2-4 tablets once a day. Can go up to twice a day but do not exceed 8 tabs in one day.   You may need to consistently take this regimen daily to ensure proper bowel movements and habits.    ------------------------------------------------------------------------------------    Constipation    WHAT YOU NEED TO KNOW:    What is constipation? Constipation means you have hard, dry bowel movements, or you go longer than usual between bowel movements.    What causes constipation?    Not enough water or high-fiber foods    Lack of physical activity    Certain medicines, such as opioid pain medicine, antidepressants, or high blood pressure medicine    A medical condition such as hemorrhoids, diabetes, or a stroke  What are the signs and symptoms of constipation?    Trouble pushing out your bowel movement    Pain or bleeding during your bowel movement    A feeling that you did not finish having your bowel movement    Bloating  How is constipation diagnosed? Your healthcare provider will ask about your symptoms and examine you. Tell the provider how often you have a bowel movement and if it is painful. Your provider may ask about your eating habits and if you exercise. Tell the provider about any medicines you take, including fiber supplements. You may need an x-ray of your abdomen. This will help your provider see if you have constipation.    How is constipation treated? Medicine can help you have a bowel movement more easily. Medicines may increase moisture in your bowel movement or increase the motion of your intestines.    A suppository may be used to help soften your bowel movements. This may make them easier to pass. A suppository is guided into your rectum through your anus.  Suppository for Constipation      Laxatives can help stimulate your bowels to have a bowel movement. Your provider may recommend you only use laxatives for a short time. Long-term use can damage your bowel function over time.    An enema is liquid medicine used to clear bowel movement from your rectum. The medicine is put into your rectum through your anus.  Enemas  What can I do to prevent constipation?    Drink liquids as directed. You may need to drink extra liquids to help soften and move your bowels. Ask how much liquid to drink each day and which liquids are best for you.    Eat high-fiber foods. This may help decrease constipation by adding bulk to your bowel movements. High-fiber foods include fruit, vegetables, whole-grain breads and cereals, and beans. Your healthcare provider or dietitian can help you create a high-fiber meal plan. Your provider may also recommend a fiber supplement if you cannot get enough fiber from food.        Exercise regularly. Regular physical activity can help stimulate your intestines. Walking is a good exercise to prevent or relieve constipation. Ask which exercises are best for you.   FAMILY WALKING FOR EXERCISE      Schedule a time each day to have a bowel movement. This may help train your body to have regular bowel movements. Bend forward while you are on the toilet to help move the bowel movement out. Sit on the toilet for at least 10 minutes, even if you do not have a bowel movement.    Talk to your healthcare provider about your medicines. Certain medicines, such as opioids, can cause constipation. Your provider may be able to make medicine changes. For example, he or she may change the kind of medicine, or change when you take it.  When should I call my doctor?    You have blood in your bowel movements.    You have a fever and abdominal pain with the constipation.    Your constipation gets worse.    You start to vomit.    You have questions or concerns about your condition or care.

## 2023-08-30 NOTE — ED PROVIDER NOTE - PROGRESS NOTE DETAILS
Thompson Vidales MD PGY1: Patient reassessed, stable. CT demonstrates no obstruction but a marked stool burden consistent with acute on chronic constipation. Patient made aware of imaging findings. Enema ordered for patient to go home with. Patient urged to start consistent bowel regimen (miralax,senna). Now endorsing 1 small BM in ED. SW contacted to arrange transport. To be discharged, patient agreeable to plan. Deysi: Pt endorsed to me from Dr. Uriarte.  CT noted to have no obstruction, no acute pathology, marked stool burden noted.  Bowel regimen recommended.  Pt stable for discharge.

## 2023-08-30 NOTE — ED ADULT NURSE NOTE - NSFALLUNIVINTERV_ED_ALL_ED
Bed/Stretcher in lowest position, wheels locked, appropriate side rails in place/Call bell, personal items and telephone in reach/Instruct patient to call for assistance before getting out of bed/chair/stretcher/Non-slip footwear applied when patient is off stretcher/[address] to call system/Physically safe environment - no spills, clutter or unnecessary equipment/Purposeful proactive rounding/Room/bathroom lighting operational, light cord in reach

## 2023-08-30 NOTE — ED ADULT NURSE NOTE - CAS ELECT INFOMATION PROVIDED
Pt is alert and oriented x 3, breathing freely via room air with no sign of acute distress noted. Pt instructed on how to use enema. Lay on your side with one leg bent, insert enema in anus, squeeze all of the content and hold for 10-15 minute until urge to defecate. Patient verbally returned instructions to writer. Pt escorted to cab service for transportation by . Pt left the unit in stable condition./DC instructions

## 2023-08-30 NOTE — ED PROVIDER NOTE - ATTENDING CONTRIBUTION TO CARE
Dr. Uriarte, Attending Physician-  I performed a face to face bedside interview with patient regarding history of present illness, review of symptoms and past medical history. I completed an independent physical exam.  I have discussed patient's plan of care with the resident.    52F, diverticulitis, prior hysterectomy, who presents with abdominal pain. Has not stooled in over ten days. Typically does not have regular stools. Was seen in the past week at OSH and found to have radiology confirmed diverticulitis - today is her last day of ABX. No dysuria/hematuria. No headaches, fevers, chills, cough, sputum, cp, sob. Physical: Afebrile, well appearing, neck supple, no rash, rrr, ctabl, abdomen soft and ndnt, no le edema, stable gait. Plan: labs, CTAP. Dispo pending.

## 2023-08-30 NOTE — ED PROVIDER NOTE - PHYSICAL EXAMINATION
GEN: Patient awake and alert. No acute distress. Well appearing.  Head: normocephalic, atraumatic.  Neck: Nontender, full ROM. No LAD.  Eyes: PERRLA b/l. EOMI, no scleral icterus, no conjunctival injection. Moist mucous membranes.  CARDIAC: RRR. Normal S1, S2. No murmur, rubs, or gallops. No peripheral edema noted.  PULM: CTA B/L no wheeze, rales or rhonchi. No signs of respiratory distress, no accessory muscle usage or nasal flaring.  ABD: Soft, mildly TTP in LLQ, nondistended. No rebound, no involuntary guarding. BS x 4, no auscultated bruit. No HSM appreciated.  : No CVA tenderness, no suprapubic tenderness.  MSK: Moving all extremities. 5/5 strength and full ROM in all extremities. No obvious deformity.  NEURO: A&Ox3, no focal neurological deficits.  SKIN: warm, dry, no rash, no lesions, no open wounds. No urticaria.

## 2023-08-30 NOTE — ED PROVIDER NOTE - NSPTACCESSSVCSAPPTDETAILS_ED_ALL_ED_FT
Patient has multiple chronic medical conditions and needs referral and help with establishing with primary care. thank you!

## 2023-08-30 NOTE — ED PROVIDER NOTE - OBJECTIVE STATEMENT
52-year-old female with history of recent diverticulitis on amoxicillin, prior history of ectopic pregnancy and hysterectomy, IDDM, presenting to the emergency department with subacute abdominal pain.  Patient reports he has not had a bowel movement in 10 days.  + flatus.  Was seen at outside hospital and noted to have diverticulitis on imaging.  Has taken her full course of antibiotics which today's last day.  Denies urinary symptoms.  No headaches, fevers, chills, cough, chest pain/shortness of breath.  No rashes.  Endorsing nausea but no vomiting.

## 2023-08-30 NOTE — ED PROVIDER NOTE - CLINICAL SUMMARY MEDICAL DECISION MAKING FREE TEXT BOX
52-year-old female with history of diverticulitis and ectopic/hysterectomy presenting emergency department with 10 days without bowel movement.  Patient otherwise well-appearing.  Positive flatus.  No vomiting.  Denies chest pain/shortness of breath.  Patient has a previous history of constipation.  DDx includes functional abdominal pain in the setting of constipation versus concern for small bowel obstruction in setting of abdominal surgeries.  Will obtain screening labs, symptomatic management, CT abdomen pelvis.  Disposition pending labs and imaging.

## 2023-08-31 VITALS
RESPIRATION RATE: 18 BRPM | HEART RATE: 99 BPM | SYSTOLIC BLOOD PRESSURE: 141 MMHG | DIASTOLIC BLOOD PRESSURE: 73 MMHG | OXYGEN SATURATION: 98 %

## 2023-08-31 PROCEDURE — 74177 CT ABD & PELVIS W/CONTRAST: CPT | Mod: 26,MA

## 2023-08-31 RX ADMIN — Medication 1 ENEMA: at 02:51

## 2023-08-31 NOTE — PROVIDER CONTACT NOTE (OTHER) - ASSESSMENT
Pt reports she currently stays at a shelter in the [place] on [address].  Is requesting taxi via [email or web address].

## 2023-08-31 NOTE — ED ADULT NURSE REASSESSMENT NOTE - GENERAL PATIENT STATE
Pt reports a small BM. EVAN Vidales MD spoke to pt and informed her she will be discharged with an enema to take a home. Pt verbalized understanding.

## 2023-09-22 ENCOUNTER — INPATIENT (INPATIENT)
Facility: HOSPITAL | Age: 53
LOS: 33 days | Discharge: NURSING, REHAB OR LONG-TERM CARE | End: 2023-10-26
Attending: HOSPITALIST | Admitting: HOSPITALIST
Payer: MEDICAID

## 2023-09-22 VITALS
HEART RATE: 92 BPM | DIASTOLIC BLOOD PRESSURE: 82 MMHG | TEMPERATURE: 99 F | OXYGEN SATURATION: 100 % | RESPIRATION RATE: 18 BRPM | SYSTOLIC BLOOD PRESSURE: 153 MMHG

## 2023-09-22 DIAGNOSIS — H33.22 SEROUS RETINAL DETACHMENT, LEFT EYE: ICD-10-CM

## 2023-09-22 LAB
ALBUMIN SERPL ELPH-MCNC: 4.2 G/DL — SIGNIFICANT CHANGE UP (ref 3.3–5)
ALP SERPL-CCNC: 115 U/L — SIGNIFICANT CHANGE UP (ref 40–120)
ALT FLD-CCNC: 12 U/L — SIGNIFICANT CHANGE UP (ref 4–33)
ANION GAP SERPL CALC-SCNC: 10 MMOL/L — SIGNIFICANT CHANGE UP (ref 7–14)
AST SERPL-CCNC: 11 U/L — SIGNIFICANT CHANGE UP (ref 4–32)
B-OH-BUTYR SERPL-SCNC: <0 MMOL/L — SIGNIFICANT CHANGE UP (ref 0–0.4)
BASE EXCESS BLDV CALC-SCNC: 5.1 MMOL/L — HIGH (ref -2–3)
BASOPHILS # BLD AUTO: 0.05 K/UL — SIGNIFICANT CHANGE UP (ref 0–0.2)
BASOPHILS NFR BLD AUTO: 0.8 % — SIGNIFICANT CHANGE UP (ref 0–2)
BILIRUB SERPL-MCNC: <0.2 MG/DL — SIGNIFICANT CHANGE UP (ref 0.2–1.2)
BLOOD GAS VENOUS COMPREHENSIVE RESULT: SIGNIFICANT CHANGE UP
BUN SERPL-MCNC: 19 MG/DL — SIGNIFICANT CHANGE UP (ref 7–23)
CALCIUM SERPL-MCNC: 9.4 MG/DL — SIGNIFICANT CHANGE UP (ref 8.4–10.5)
CHLORIDE BLDV-SCNC: 100 MMOL/L — SIGNIFICANT CHANGE UP (ref 96–108)
CHLORIDE SERPL-SCNC: 98 MMOL/L — SIGNIFICANT CHANGE UP (ref 98–107)
CO2 BLDV-SCNC: 33.8 MMOL/L — HIGH (ref 22–26)
CO2 SERPL-SCNC: 29 MMOL/L — SIGNIFICANT CHANGE UP (ref 22–31)
CREAT SERPL-MCNC: 1.1 MG/DL — SIGNIFICANT CHANGE UP (ref 0.5–1.3)
EGFR: 60 ML/MIN/1.73M2 — SIGNIFICANT CHANGE UP
EOSINOPHIL # BLD AUTO: 0.21 K/UL — SIGNIFICANT CHANGE UP (ref 0–0.5)
EOSINOPHIL NFR BLD AUTO: 3.5 % — SIGNIFICANT CHANGE UP (ref 0–6)
GAS PNL BLDV: 134 MMOL/L — LOW (ref 136–145)
GLUCOSE BLDV-MCNC: 154 MG/DL — HIGH (ref 70–99)
GLUCOSE SERPL-MCNC: 160 MG/DL — HIGH (ref 70–99)
HCO3 BLDV-SCNC: 32 MMOL/L — HIGH (ref 22–29)
HCT VFR BLD CALC: 33.8 % — LOW (ref 34.5–45)
HCT VFR BLDA CALC: 32 % — LOW (ref 34.5–46.5)
HGB BLD CALC-MCNC: 10.7 G/DL — LOW (ref 11.7–16.1)
HGB BLD-MCNC: 11 G/DL — LOW (ref 11.5–15.5)
IANC: 2.79 K/UL — SIGNIFICANT CHANGE UP (ref 1.8–7.4)
IMM GRANULOCYTES NFR BLD AUTO: 0.2 % — SIGNIFICANT CHANGE UP (ref 0–0.9)
LACTATE BLDV-MCNC: 0.7 MMOL/L — SIGNIFICANT CHANGE UP (ref 0.5–2)
LYMPHOCYTES # BLD AUTO: 2.57 K/UL — SIGNIFICANT CHANGE UP (ref 1–3.3)
LYMPHOCYTES # BLD AUTO: 42.4 % — SIGNIFICANT CHANGE UP (ref 13–44)
MCHC RBC-ENTMCNC: 27.2 PG — SIGNIFICANT CHANGE UP (ref 27–34)
MCHC RBC-ENTMCNC: 32.5 GM/DL — SIGNIFICANT CHANGE UP (ref 32–36)
MCV RBC AUTO: 83.7 FL — SIGNIFICANT CHANGE UP (ref 80–100)
MONOCYTES # BLD AUTO: 0.43 K/UL — SIGNIFICANT CHANGE UP (ref 0–0.9)
MONOCYTES NFR BLD AUTO: 7.1 % — SIGNIFICANT CHANGE UP (ref 2–14)
NEUTROPHILS # BLD AUTO: 2.79 K/UL — SIGNIFICANT CHANGE UP (ref 1.8–7.4)
NEUTROPHILS NFR BLD AUTO: 46 % — SIGNIFICANT CHANGE UP (ref 43–77)
NRBC # BLD: 0 /100 WBCS — SIGNIFICANT CHANGE UP (ref 0–0)
NRBC # FLD: 0 K/UL — SIGNIFICANT CHANGE UP (ref 0–0)
PCO2 BLDV: 58 MMHG — HIGH (ref 39–52)
PH BLDV: 7.35 — SIGNIFICANT CHANGE UP (ref 7.32–7.43)
PLATELET # BLD AUTO: 198 K/UL — SIGNIFICANT CHANGE UP (ref 150–400)
PO2 BLDV: 32 MMHG — SIGNIFICANT CHANGE UP (ref 25–45)
POTASSIUM BLDV-SCNC: 4 MMOL/L — SIGNIFICANT CHANGE UP (ref 3.5–5.1)
POTASSIUM SERPL-MCNC: 3.8 MMOL/L — SIGNIFICANT CHANGE UP (ref 3.5–5.3)
POTASSIUM SERPL-SCNC: 3.8 MMOL/L — SIGNIFICANT CHANGE UP (ref 3.5–5.3)
PROT SERPL-MCNC: 7.3 G/DL — SIGNIFICANT CHANGE UP (ref 6–8.3)
RBC # BLD: 4.04 M/UL — SIGNIFICANT CHANGE UP (ref 3.8–5.2)
RBC # FLD: 13.8 % — SIGNIFICANT CHANGE UP (ref 10.3–14.5)
SAO2 % BLDV: 54.9 % — LOW (ref 67–88)
SODIUM SERPL-SCNC: 137 MMOL/L — SIGNIFICANT CHANGE UP (ref 135–145)
WBC # BLD: 6.06 K/UL — SIGNIFICANT CHANGE UP (ref 3.8–10.5)
WBC # FLD AUTO: 6.06 K/UL — SIGNIFICANT CHANGE UP (ref 3.8–10.5)

## 2023-09-22 PROCEDURE — 99285 EMERGENCY DEPT VISIT HI MDM: CPT

## 2023-09-22 RX ORDER — INSULIN GLARGINE 100 [IU]/ML
5 INJECTION, SOLUTION SUBCUTANEOUS AT BEDTIME
Refills: 0 | Status: DISCONTINUED | OUTPATIENT
Start: 2023-09-22 | End: 2023-09-23

## 2023-09-22 NOTE — ED PROVIDER NOTE - NSICDXPASTMEDICALHX_GEN_ALL_CORE_FT
PAST MEDICAL HISTORY:  Blindness of right eye     DM2 (diabetes mellitus, type 2)     HLD (hyperlipidemia)

## 2023-09-22 NOTE — ED PROVIDER NOTE - OBJECTIVE STATEMENT
52-year-old female history of insulin-dependent diabetes, hyperlipidemia, no history of right sided retinal detachment 2020,  presenting for acute onset left-sided painless vision loss.  Patient states that a couple of hours ago, had sudden decreased vision of her left eye, and can only see hand motion and silhouettes Patient states that she does have history of right sided retinal detachment back in 2020, with resultant right eye blindness. Pt follows at [facility], patient's vision was 20/70 at the time OS, now reduced to hadn motion.   Patient is denying any headache, neck pain or neck stiffness.  Denies any chest pain, shortness of breath, abdominal pain, nausea, vomiting, fevers or chills.

## 2023-09-22 NOTE — ED PROVIDER NOTE - CARE PLAN
1 Principal Discharge DX:	Left retinal detachment  Secondary Diagnosis:	H/O vitreous hemorrhage of left eye

## 2023-09-22 NOTE — CONSULT NOTE ADULT - ASSESSMENT
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    #PDR OU  #TRD OU  #Vitreous hemorrage OS  - Pt's vision in the left eye worsened from 20/70 in August 2023 when last seen at [facility] to  today in the ED  - On exam, pt was found to have vit heme in the left eye along with tractional RD and dense fibrosis  - Possible that pt's RD progressed to involve the fovea, resulting in worsening of vision along with the vit heme worsening her vision  - No acute ophthalmologic intervention at this time  - Remainder of management per primary team  - Ophthalmology will follow    Discussed with Dr. Reyes, chief.    Outpatient Follow-up: Patient should follow-up with his/her ophthalmologist or with [facility] Department of Ophthalmology within 1 week of after discharge at:    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    #PDR OU  #TRD OU  #Vitreous hemorrhage OS  - Pt's vision in the left eye worsened from 20/70 in August 2023 when last seen at [facility] to  today in the ED  - On exam, pt was found to have vit heme in the left eye along with tractional RD and dense fibrosis  - Possible that pt's RD progressed to involve the fovea, resulting in worsening of vision along with the vit heme worsening her vision  - No acute ophthalmologic intervention at this time  - Remainder of management per primary team  - Ophthalmology will follow    Discussed with Dr. Reyes, chief.    Outpatient Follow-up: Patient should follow-up with his/her ophthalmologist or with [facility] Department of Ophthalmology within 1 week of after discharge at:    [address]  [place] [address]  [phone]    Ronan Garcia MD, PGY-2  Also available on [employer] Teams

## 2023-09-22 NOTE — ED ADULT NURSE NOTE - FINAL NURSING ELECTRONIC SIGNATURE
SPEECH/LANGUAGE/COGNITIVE EVALUATION - INPATIENT    Admission Date: 6/21/2021  Evaluation Date: 06/29/21    Reason for Referral: Stroke protocol    ASSESSMENT & PLAN   ASSESSMENT & IMPRESSION  PPE REQUIRED. THIS SLP WORE GOWN, GLOVES, AND DROPLET MASK.  Nahun Alonso chronic appearing deep white matter ischemic change in the periventricular white matter bilaterally.               Dictated by (CST): Janet Snider MD on 6/27/2021 at 4:34 PM       Finalized by (CST): Janet Snider MD on 6/27/2021 at 4:43 PM            P 23-Sep-2023 01:10

## 2023-09-22 NOTE — ED ADULT NURSE NOTE - CHIEF COMPLAINT QUOTE
Pt presents to ED via wheelchair from store with c/o vision loss which has improved upon arrival to ED. Pt endorses hx of DM and vision loss but reports while in the store, her vision became more dim than usual. Upon arrival to ED pt reports mild improvement of vision. Pt denies headache, dizziness, numbness, tingling, weakness or other neuro deficits. MD called for stroke eval, advised no code stroke to be activated at this time.

## 2023-09-22 NOTE — ED ADULT TRIAGE NOTE - CHIEF COMPLAINT QUOTE
Pt presents to ED via wheelchair from store with c/o vision loss which has improved upon arrival to ED. Pt endorses hx of DM and vision loss but reports while in the store, her vision became more dim than usual. Upon arrival to ED pt reports mild improvement of vision. Pt denies headache, dizziness, numbness, tingling, weakness or other neuro deficits. Pt presents to ED via wheelchair from store with c/o vision loss which has improved upon arrival to ED. Pt endorses hx of DM and vision loss but reports while in the store, her vision became more dim than usual. Upon arrival to ED pt reports mild improvement of vision. Pt denies headache, dizziness, numbness, tingling, weakness or other neuro deficits. MD called for stroke eval, advised no code stroke to be activated at this time.

## 2023-09-22 NOTE — ED ADULT NURSE NOTE - OBJECTIVE STATEMENT
Patient received in ED room 3. A&OX4 and ambulatory. History of insulin-dependent diabetes, hyperlipidemia, no history of right sided retinal detachment 2020,  presenting for acute onset left-sided painless vision loss. Patient states that a couple of hours ago, had sudden decreased vision of her left eye, and can only see hand motion and silhouettes Patient states that she does have history of right sided retinal detachment back in 2020, with resultant right eye blindness. Denies CP, SOB, nausea, vomiting, headache, lightheadedness, dizziness, neck pain, fever or chills. Respirations even and unlabored. No acute distress noted. Speaking in full and complete sentences. Placed on bedside cardiac monitor - NSR. USIV 18g placed to left upper arm by MD Antoine after two RN attempts. Labs drawn and sent as ordered. Bed in lowest position, call bell in hand, wheels locked, fall precautions in effect, safety maintained. Awaiting further orders.

## 2023-09-22 NOTE — ED PROVIDER NOTE - PHYSICAL EXAMINATION
Gen: NAD; well appearing  Head: NCAT  Eyes: +pupils b/l dilated, minimally reactive (after dilated exam by optho). no vision R eye, HM L eye.   ENT: mucous membranes moist, no discharge  Neck: neck supple  Resp: CTAB, no W/R/R  CV: RRR, +S1/S2, no M/R/G  GI: Abdomen soft non-distended, NTTP, no masses  MSK: No open wounds, no bruising, no lower extremity edema  Neuro: A&Ox4, sensation nl, motor 5/5 RUE/LUE/RLE/LLE, follows commands  Ext: no edema, no deformity, warm and well-perfused  Skin: no rash or bruising

## 2023-09-22 NOTE — ED ADULT NURSE NOTE - NSFALLHARMRISKINTERV_ED_ALL_ED

## 2023-09-22 NOTE — ED PROVIDER NOTE - ATTENDING CONTRIBUTION TO CARE
DR. BLOCH, ATTENDING MD-  I performed a face to face bedside interview with patient regarding history of present illness, review of symptoms and past medical history. I completed an independent physical exam.  I have discussed patient's plan of care with the resident.  Patient examined, only able to see shadows, left eye, blind in right eye, at baseline, new blindness left eye.pupils 2-3 mm, minimally reactive, cornea clear. neck supple, heart s1s2, lungs clear, abd soft nontender. extrem no edema.

## 2023-09-22 NOTE — ED PROVIDER NOTE - CLINICAL SUMMARY MEDICAL DECISION MAKING FREE TEXT BOX
Arash NUÑEZ PGY-3: 52-year-old female history of insulin-dependent diabetes, hyperlipidemia, no history of right sided retinal detachment 2020,  presenting for acute onset left-sided painless vision loss. VSS. Patient able to see hand motion L eye. Evaluated by ophthalmology, was found to have L vitreous hemorrhage and retinal detachment. Per ophthalmology, pt not to receive any urgent intervention, however amenable to followign pt during admission here. Will obtain labs and admit patient for rehab placement given that pt is now completely blind and lives in shelter.

## 2023-09-23 DIAGNOSIS — I63.9 CEREBRAL INFARCTION, UNSPECIFIED: ICD-10-CM

## 2023-09-23 DIAGNOSIS — Z29.9 ENCOUNTER FOR PROPHYLACTIC MEASURES, UNSPECIFIED: ICD-10-CM

## 2023-09-23 DIAGNOSIS — H54.62 UNQUALIFIED VISUAL LOSS, LEFT EYE, NORMAL VISION RIGHT EYE: ICD-10-CM

## 2023-09-23 DIAGNOSIS — E78.5 HYPERLIPIDEMIA, UNSPECIFIED: ICD-10-CM

## 2023-09-23 DIAGNOSIS — E11.9 TYPE 2 DIABETES MELLITUS WITHOUT COMPLICATIONS: ICD-10-CM

## 2023-09-23 LAB
ANION GAP SERPL CALC-SCNC: 8 MMOL/L — SIGNIFICANT CHANGE UP (ref 7–14)
BUN SERPL-MCNC: 21 MG/DL — SIGNIFICANT CHANGE UP (ref 7–23)
CALCIUM SERPL-MCNC: 9 MG/DL — SIGNIFICANT CHANGE UP (ref 8.4–10.5)
CHLORIDE SERPL-SCNC: 102 MMOL/L — SIGNIFICANT CHANGE UP (ref 98–107)
CO2 SERPL-SCNC: 28 MMOL/L — SIGNIFICANT CHANGE UP (ref 22–31)
CREAT SERPL-MCNC: 0.96 MG/DL — SIGNIFICANT CHANGE UP (ref 0.5–1.3)
EGFR: 71 ML/MIN/1.73M2 — SIGNIFICANT CHANGE UP
FERRITIN SERPL-MCNC: 118 NG/ML — SIGNIFICANT CHANGE UP (ref 13–330)
FOLATE SERPL-MCNC: 8.8 NG/ML — SIGNIFICANT CHANGE UP (ref 3.1–17.5)
GLUCOSE BLDC GLUCOMTR-MCNC: 133 MG/DL — HIGH (ref 70–99)
GLUCOSE BLDC GLUCOMTR-MCNC: 135 MG/DL — HIGH (ref 70–99)
GLUCOSE BLDC GLUCOMTR-MCNC: 158 MG/DL — HIGH (ref 70–99)
GLUCOSE BLDC GLUCOMTR-MCNC: 233 MG/DL — HIGH (ref 70–99)
GLUCOSE BLDC GLUCOMTR-MCNC: 88 MG/DL — SIGNIFICANT CHANGE UP (ref 70–99)
GLUCOSE SERPL-MCNC: 176 MG/DL — HIGH (ref 70–99)
HCT VFR BLD CALC: 33.2 % — LOW (ref 34.5–45)
HGB BLD-MCNC: 10.5 G/DL — LOW (ref 11.5–15.5)
IRON SATN MFR SERPL: 17 % — SIGNIFICANT CHANGE UP (ref 14–50)
IRON SATN MFR SERPL: 41 UG/DL — SIGNIFICANT CHANGE UP (ref 30–160)
MAGNESIUM SERPL-MCNC: 2 MG/DL — SIGNIFICANT CHANGE UP (ref 1.6–2.6)
MCHC RBC-ENTMCNC: 26.7 PG — LOW (ref 27–34)
MCHC RBC-ENTMCNC: 31.6 GM/DL — LOW (ref 32–36)
MCV RBC AUTO: 84.5 FL — SIGNIFICANT CHANGE UP (ref 80–100)
NRBC # BLD: 0 /100 WBCS — SIGNIFICANT CHANGE UP (ref 0–0)
NRBC # FLD: 0 K/UL — SIGNIFICANT CHANGE UP (ref 0–0)
PHOSPHATE SERPL-MCNC: 3.4 MG/DL — SIGNIFICANT CHANGE UP (ref 2.5–4.5)
PLATELET # BLD AUTO: 170 K/UL — SIGNIFICANT CHANGE UP (ref 150–400)
POTASSIUM SERPL-MCNC: 4.8 MMOL/L — SIGNIFICANT CHANGE UP (ref 3.5–5.3)
POTASSIUM SERPL-SCNC: 4.8 MMOL/L — SIGNIFICANT CHANGE UP (ref 3.5–5.3)
RBC # BLD: 3.93 M/UL — SIGNIFICANT CHANGE UP (ref 3.8–5.2)
RBC # FLD: 13.6 % — SIGNIFICANT CHANGE UP (ref 10.3–14.5)
SODIUM SERPL-SCNC: 138 MMOL/L — SIGNIFICANT CHANGE UP (ref 135–145)
TIBC SERPL-MCNC: 240 UG/DL — SIGNIFICANT CHANGE UP (ref 220–430)
TSH SERPL-MCNC: 0.79 UIU/ML — SIGNIFICANT CHANGE UP (ref 0.27–4.2)
UIBC SERPL-MCNC: 199 UG/DL — SIGNIFICANT CHANGE UP (ref 110–370)
VIT B12 SERPL-MCNC: 447 PG/ML — SIGNIFICANT CHANGE UP (ref 200–900)
WBC # BLD: 6.29 K/UL — SIGNIFICANT CHANGE UP (ref 3.8–10.5)
WBC # FLD AUTO: 6.29 K/UL — SIGNIFICANT CHANGE UP (ref 3.8–10.5)

## 2023-09-23 PROCEDURE — 99223 1ST HOSP IP/OBS HIGH 75: CPT

## 2023-09-23 RX ORDER — SODIUM CHLORIDE 9 MG/ML
1000 INJECTION, SOLUTION INTRAVENOUS
Refills: 0 | Status: DISCONTINUED | OUTPATIENT
Start: 2023-09-23 | End: 2023-10-26

## 2023-09-23 RX ORDER — LANOLIN ALCOHOL/MO/W.PET/CERES
3 CREAM (GRAM) TOPICAL AT BEDTIME
Refills: 0 | Status: DISCONTINUED | OUTPATIENT
Start: 2023-09-23 | End: 2023-10-26

## 2023-09-23 RX ORDER — ACETAMINOPHEN 500 MG
650 TABLET ORAL EVERY 6 HOURS
Refills: 0 | Status: DISCONTINUED | OUTPATIENT
Start: 2023-09-23 | End: 2023-10-26

## 2023-09-23 RX ORDER — DEXTROSE 50 % IN WATER 50 %
15 SYRINGE (ML) INTRAVENOUS ONCE
Refills: 0 | Status: DISCONTINUED | OUTPATIENT
Start: 2023-09-23 | End: 2023-10-26

## 2023-09-23 RX ORDER — GLUCAGON INJECTION, SOLUTION 0.5 MG/.1ML
1 INJECTION, SOLUTION SUBCUTANEOUS ONCE
Refills: 0 | Status: DISCONTINUED | OUTPATIENT
Start: 2023-09-23 | End: 2023-10-26

## 2023-09-23 RX ORDER — INSULIN LISPRO 100/ML
VIAL (ML) SUBCUTANEOUS
Refills: 0 | Status: DISCONTINUED | OUTPATIENT
Start: 2023-09-23 | End: 2023-10-26

## 2023-09-23 RX ORDER — INSULIN GLARGINE 100 [IU]/ML
14 INJECTION, SOLUTION SUBCUTANEOUS AT BEDTIME
Refills: 0 | Status: DISCONTINUED | OUTPATIENT
Start: 2023-09-23 | End: 2023-10-26

## 2023-09-23 RX ORDER — DEXTROSE 50 % IN WATER 50 %
25 SYRINGE (ML) INTRAVENOUS ONCE
Refills: 0 | Status: DISCONTINUED | OUTPATIENT
Start: 2023-09-23 | End: 2023-10-26

## 2023-09-23 RX ORDER — DEXTROSE 50 % IN WATER 50 %
12.5 SYRINGE (ML) INTRAVENOUS ONCE
Refills: 0 | Status: DISCONTINUED | OUTPATIENT
Start: 2023-09-23 | End: 2023-10-26

## 2023-09-23 RX ORDER — INFLUENZA VIRUS VACCINE 15; 15; 15; 15 UG/.5ML; UG/.5ML; UG/.5ML; UG/.5ML
0.5 SUSPENSION INTRAMUSCULAR ONCE
Refills: 0 | Status: DISCONTINUED | OUTPATIENT
Start: 2023-09-23 | End: 2023-10-26

## 2023-09-23 RX ORDER — INSULIN LISPRO 100/ML
VIAL (ML) SUBCUTANEOUS AT BEDTIME
Refills: 0 | Status: DISCONTINUED | OUTPATIENT
Start: 2023-09-23 | End: 2023-10-26

## 2023-09-23 RX ORDER — ENOXAPARIN SODIUM 100 MG/ML
40 INJECTION SUBCUTANEOUS EVERY 24 HOURS
Refills: 0 | Status: DISCONTINUED | OUTPATIENT
Start: 2023-09-23 | End: 2023-10-26

## 2023-09-23 RX ORDER — INSULIN LISPRO 100/ML
8 VIAL (ML) SUBCUTANEOUS
Refills: 0 | Status: DISCONTINUED | OUTPATIENT
Start: 2023-09-23 | End: 2023-09-26

## 2023-09-23 RX ADMIN — Medication 8 UNIT(S): at 09:15

## 2023-09-23 RX ADMIN — ENOXAPARIN SODIUM 40 MILLIGRAM(S): 100 INJECTION SUBCUTANEOUS at 09:20

## 2023-09-23 RX ADMIN — Medication 8 UNIT(S): at 17:38

## 2023-09-23 RX ADMIN — Medication 3 MILLIGRAM(S): at 22:30

## 2023-09-23 RX ADMIN — Medication 8 UNIT(S): at 12:44

## 2023-09-23 RX ADMIN — Medication 1: at 09:15

## 2023-09-23 RX ADMIN — INSULIN GLARGINE 14 UNIT(S): 100 INJECTION, SOLUTION SUBCUTANEOUS at 22:30

## 2023-09-23 RX ADMIN — INSULIN GLARGINE 5 UNIT(S): 100 INJECTION, SOLUTION SUBCUTANEOUS at 00:30

## 2023-09-23 NOTE — PATIENT PROFILE ADULT - FUNCTIONAL SCREEN CURRENT LEVEL: SWALLOWING (IF SCORE 2 OR MORE FOR ANY ITEM, CONSULT REHAB SERVICES), MLM)
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   Thank you for this Mohs surgery referral.   Andressa Webster MD Physician, Dermatology & Dermatologic Surgery [facility]   0 = swallows foods/liquids without difficulty

## 2023-09-23 NOTE — H&P ADULT - PROBLEM SELECTOR PLAN 3
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 If no plans, would restart ASA 81 daily  - pt non-compliant with high-dose statin therapy, states she saw side effects and possibly had muscle pains with statin. Discussed with patient if willing to try another type of statin or reduced dosage but patient denies wanting to start again, would f/u in AM to assess if amenable

## 2023-09-23 NOTE — PHYSICAL THERAPY INITIAL EVALUATION ADULT - REFERRAL TO ANOTHER SERVICE NEEDED, PT EVAL
Case management; pt requesting resources to assist with vision loss including walking cane, and information on guide dog

## 2023-09-23 NOTE — OCCUPATIONAL THERAPY INITIAL EVALUATION ADULT - GENERAL OBSERVATIONS, REHAB EVAL
Patient received semisupine in bed in NAD. Patient agreed to participate in OT eval. HR 87 bpm. Patient left semisupine in bed in NAD.

## 2023-09-23 NOTE — H&P ADULT - NSHPPHYSICALEXAM_GEN_ALL_CORE
VITALS:   T(C): 36.9 (09-23-23 @ 01:05), Max: 37.2 (09-22-23 @ 19:09)  HR: 98 (09-23-23 @ 01:05) (92 - 98)  BP: 118/78 (09-23-23 @ 01:05) (118/78 - 153/82)  RR: 18 (09-23-23 @ 01:05) (18 - 18)  SpO2: 100% (09-23-23 @ 01:05) (99% - 100%)    GENERAL: No acute distress, lying in bed comfortably sleeping, easily awoken  EYES: EOMI, +poorly reactive dilated pupils to light but pt does report seeing light; notices hand-waving in left upper quadrant but not other quadrants. Unable to count fingers. States she sees mainly blurry vision with dark background and silhouettes  ENMT: Moist oral mucosa  NECK: Supple, no palpable masses  RESPIRATORY: Lungs clear to ascultation b/l; unlabored respirations  CARDIOVASCULAR: Regular rate and rhythm, normal S1 and S2, no murmurs/rubs/gallops  ABDOMEN: Soft, normal bowel sounds, nondistended, nontender  MUSCULOSKELETAL: No joint swelling or tenderness to palpation  PSYCH: Affect appropriate  NEUROLOGY: AAOx4 (person, place, time, event), CNs grossly intact, no focal deficits, decreased sensation in bilateral feet  SKIN: No rashes; no palpable lesions

## 2023-09-23 NOTE — H&P ADULT - PROBLEM SELECTOR PLAN 2
History of IDDM with recent insurance switch causing issues regarding obtaining medications  - A1c 8.5 (improved from prior hospitalization of >11)  - supposed to be on Basaglar 17 units QHS and Humalog 10 units TID premeals  - has not used insulin in around a month, reportedly used old leftover Metformin in the interim  - will restart basal-bolus at reduced dosage, adjust as needed based on correctional  - double check insulin insurance coverage prior to discharge  - b/l feet numbness likely iso neuropathy from diabetes. Will obtain folate, B12 to assess further given poor diet  - diabetes specialist, nutritionist History of IDDM with recent insurance switch causing issues regarding obtaining medications  - A1c 8.5 (improved from prior hospitalization of >11)  - supposed to be on Basaglar 17 units QHS and Humalog 10 units TID premeals  - has not used insulin in around a month, reportedly used old leftover Metformin in the interim  - will restart basal-bolus at reduced dosage (0.8x home dosage), adjust as needed based on correctional  - double check insulin insurance coverage prior to discharge  - b/l feet numbness likely iso neuropathy from diabetes. Will obtain folate, B12 to assess further given poor diet  - diabetes specialist, nutritionist

## 2023-09-23 NOTE — PROGRESS NOTE ADULT - SUBJECTIVE AND OBJECTIVE BOX
[facility]  Mandi Martínez DO  Available on MS Teams  Pager: [phone]    Patient is a 52y old  Female who presents with a chief complaint of L eye vision loss (23 Sep 2023 14:20)    Subjective:  Pt seen and examined at bedside resting comfortably. Very concerned about her vision loss. Does not know how she will manage on her own with current limitations.     VITAL SIGNS:  T(C): 36.5 (09-23-23 @ 13:16), Max: 36.9 (09-22-23 @ 20:12)  T(F): 97.7 (09-23-23 @ 13:16), Max: 98.5 (09-22-23 @ 20:12)  HR: 70 (09-23-23 @ 13:16) (70 - 98)  BP: 102/54 (09-23-23 @ 13:16) (102/54 - 125/71)  BP(mean): --  RR: 18 (09-23-23 @ 13:16) (17 - 18)  SpO2: 98% (09-23-23 @ 13:16) (98% - 100%)  Wt(kg): --    PHYSICAL EXAM:  Constitutional: resting comfortably in bed; NAD  Head: NC/AT  Eyes: PERRL, anicteric sclera; R eye minimal vision; L eye can see shadows/light, can not tell me how many fingers being held up  ENT: no nasal discharge; MMM  Neck: supple; no JVD  Respiratory: CTA B/L; no W/R/R  Cardiac: +S1/S2; RRR; no M/R/G  Gastrointestinal: soft, NT/ND; no rebound or guarding; +BSx4  Extremities: WWP, no clubbing or cyanosis; no peripheral edema  Musculoskeletal: NROM x4; no joint swelling, tenderness or erythema  Vascular: 2+ radial, DP/PT pulses B/L  Dermatologic: skin warm, dry and intact; no rashes, wounds, or scars  Neurologic: AAOx3; CNII-XII grossly intact; no focal deficits  Psychiatric: affect and characteristics of appearance, verbalizations, behaviors are appropriate    MEDICATIONS  (STANDING):  dextrose 5%. 1000 milliLiter(s) (100 mL/Hr) IV Continuous <Continuous>  dextrose 5%. 1000 milliLiter(s) (50 mL/Hr) IV Continuous <Continuous>  dextrose 50% Injectable 25 Gram(s) IV Push once  dextrose 50% Injectable 25 Gram(s) IV Push once  dextrose 50% Injectable 12.5 Gram(s) IV Push once  enoxaparin Injectable 40 milliGRAM(s) SubCutaneous every 24 hours  glucagon  Injectable 1 milliGRAM(s) IntraMuscular once  influenza   Vaccine 0.5 milliLiter(s) IntraMuscular once  insulin glargine Injectable (LANTUS) 14 Unit(s) SubCutaneous at bedtime  insulin lispro (ADMELOG) corrective regimen sliding scale   SubCutaneous three times a day before meals  insulin lispro (ADMELOG) corrective regimen sliding scale   SubCutaneous at bedtime  insulin lispro Injectable (ADMELOG) 8 Unit(s) SubCutaneous three times a day before meals    MEDICATIONS  (PRN):  acetaminophen     Tablet .. 650 milliGRAM(s) Oral every 6 hours PRN Temp greater or equal to 38C (100.4F), Mild Pain (1 - 3)  dextrose Oral Gel 15 Gram(s) Oral once PRN Blood Glucose LESS THAN 70 milliGRAM(s)/deciliter  melatonin 3 milliGRAM(s) Oral at bedtime PRN Insomnia      LABS:                        10.5   6.29  )-----------( 170      ( 23 Sep 2023 09:21 )             33.2     09-23    138  |  102  |  21  ----------------------------<  176<H>  4.8   |  28  |  0.96    Ca    9.0      23 Sep 2023 09:21  Phos  3.4     09-23  Mg     2.00     09-23    TPro  7.3  /  Alb  4.2  /  TBili  <0.2  /  DBili  x   /  AST  11  /  ALT  12  /  AlkPhos  115  09-22      Urinalysis Basic - ( 23 Sep 2023 09:21 )    Color: x / Appearance: x / SG: x / pH: x  Gluc: 176 mg/dL / Ketone: x  / Bili: x / Urobili: x   Blood: x / Protein: x / Nitrite: x   Leuk Esterase: x / RBC: x / WBC x   Sq Epi: x / Non Sq Epi: x / Bacteria: x      CAPILLARY BLOOD GLUCOSE      POCT Blood Glucose.: 133 mg/dL (23 Sep 2023 17:05)      RADIOLOGY & ADDITIONAL TESTS: Reviewed.

## 2023-09-23 NOTE — OCCUPATIONAL THERAPY INITIAL EVALUATION ADULT - PERTINENT HX OF CURRENT PROBLEM, REHAB EVAL
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

## 2023-09-23 NOTE — H&P ADULT - ASSESSMENT
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

## 2023-09-23 NOTE — PHYSICAL THERAPY INITIAL EVALUATION ADULT - PERTINENT HX OF CURRENT PROBLEM, REHAB EVAL
Pt is a 52 year old female with sudden acute painless left-sided vision loss. 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 Opthalmology consulted and patient found to have vitreous hemorrhage and retinal detachment of the left eye.

## 2023-09-23 NOTE — H&P ADULT - NSHPLABSRESULTS_GEN_ALL_CORE
LABS:                          11.0   6.06  )-----------( 198      ( 22 Sep 2023 21:05 )             33.8     09-22    137  |  98  |  19  ----------------------------<  160<H>  3.8   |  29  |  1.10    Ca    9.4      22 Sep 2023 21:05    TPro  7.3  /  Alb  4.2  /  TBili  <0.2  /  DBili  x   /  AST  11  /  ALT  12  /  AlkPhos  115  09-22    LIVER FUNCTIONS - ( 22 Sep 2023 21:05 )  Alb: 4.2 g/dL / Pro: 7.3 g/dL / ALK PHOS: 115 U/L / ALT: 12 U/L / AST: 11 U/L / GGT: x             Urinalysis Basic - ( 22 Sep 2023 21:05 )    Color: x / Appearance: x / SG: x / pH: x  Gluc: 160 mg/dL / Ketone: x  / Bili: x / Urobili: x   Blood: x / Protein: x / Nitrite: x   Leuk Esterase: x / RBC: x / WBC x   Sq Epi: x / Non Sq Epi: x / Bacteria: x    A1c 8.5      IMAGING:      < from: CT Abdomen and Pelvis w/ IV Cont (08.31.23 @ 01:16) >    FINDINGS:  LOWER CHEST: Within normal limits.    LIVER: Within normal limits.  BILE DUCTS: Normal caliber.  GALLBLADDER: Within normal limits.  SPLEEN: Within normal limits.  PANCREAS: Within normal limits.  ADRENALS: Within normal limits.  KIDNEYS/URETERS: Partial left renal duplication. Symmetric renal   enhancement without hydronephrosis.    BLADDER: Within normal limits.  REPRODUCTIVE ORGANS: Hysterectomy.    BOWEL: No bowel obstruction.Colonic diverticulosis without evidence of   acute diverticulitis. The appendix is normal.  PERITONEUM: No ascites.  VESSELS: Within normal limits.  RETROPERITONEUM/LYMPH NODES: Small fat-containing umbilical hernia and   left inguinal hernia. Left abdominal wall lipoma (2:42).  ABDOMINAL WALL: Tiny fat-containing umbilical hernia. Small   fat-containing left inguinal hernia.  BONES: Within normal limits.    IMPRESSION:    Colonic diverticulosis without evidence of diverticulitis.  Moderate fecalload.    --- End of Report ---    < end of copied text >

## 2023-09-23 NOTE — H&P ADULT - PROBLEM SELECTOR PLAN 5
DVT ppx: Lovenox  Diet: DASH/TLC, Consistent Carb  PT/OT to assess balance and function with vision changes  SW to help with insurance and medication issues

## 2023-09-23 NOTE — H&P ADULT - NSHPSOCIALHISTORY_GEN_ALL_CORE
Undomiciled in the past 3 months, lives in shelter.   Recently switched insurances leading to difficulties getting medications and needs new PCP.  Used to ambulate independently without needing walker/cane prior to this new vision loss.

## 2023-09-23 NOTE — H&P ADULT - PROBLEM SELECTOR PLAN 1
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  - Ophthalmology eval appreciated, c/f patient's left tractional RD progressing to involve fovea leading to worsening vision along with vit hemorrhage  - no acute ophthalmological intervention currently overnight  - admitted in setting of social situation with undomiciled state and now near-blindness  - f/u PT, OT  - f/u ophtho recs

## 2023-09-23 NOTE — PATIENT PROFILE ADULT - FALL HARM RISK - HARM RISK INTERVENTIONS

## 2023-09-23 NOTE — PHYSICAL THERAPY INITIAL EVALUATION ADULT - GENERAL OBSERVATIONS, REHAB EVAL
Upon entry, pt semi-supine in bed in NAD. HR 85 bpm. Pt left as received with all tubes/lines intact, bed alarm on, call bell in reach and in NAD.

## 2023-09-23 NOTE — H&P ADULT - TIME BILLING
Preparing to see the patient including review of tests and other providers' notes, confirming history with patient, performing medical examination and evaluation, counseling and educating the patient, ordering medications, tests and procedures, communicating with other health care professionals, documenting clinical information in the EMR, independently interpreting results and communicating results to the patient, care coordination

## 2023-09-23 NOTE — H&P ADULT - HISTORY OF PRESENT ILLNESS
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 Patient states she first noticed it a couple of hours prior to arrival to ED. She was entering a store when she suddenly noticed loss of vision, and needed help from bystanders to direct her to the check-out. She endorses only being able to see hand motion and silhouettes currently, denies double vision. Patient normally follows at [facility] and reports her vision was 20/70 at the time in her left eye in Aug 2023. Patient also endorses numbness in bilateral feet and chronic balance issues and endorses having not taken her insulin for the past month. Patient denies fevers/chills, headaches/dizziness, chest pain, difficulty breathing, abdominal pain, dysuria, diarrhea.     In the ED, patient with vitals T 98.9F, HR 92, /67, and O2 saturation of 99% on RA with RR 18. Labs notable for anemia and A1c 8.5. CT abd/pelvis obtained with colonic diverticulosis without diverticulitis. Received 5 units of Lantus in the ED.

## 2023-09-23 NOTE — PHYSICAL THERAPY INITIAL EVALUATION ADULT - GAIT DEVIATIONS NOTED, PT EVAL
ambulates with hands up due to vision loss; required occasional assistance with directions due to vision impairments/decreased obdulia/decreased step length/decreased stride length/decreased weight-shifting ability

## 2023-09-23 NOTE — H&P ADULT - NSHPREVIEWOFSYSTEMS_GEN_ALL_CORE
REVIEW OF SYSTEMS:    CONSTITUTIONAL: No fevers. No chills. No fatigue.  HEENT: No change in hearing. No sore throat. +change in L vision, blurry, painless, now only seeing silhouettes and hand motion  CARDIOVASCULAR: No chest pain. No palpitations  RESPIRATORY: No shortness of breath. No cough.  GASTROINTESTINAL: No nausea. No vomiting. No abdominal pain. No change in bowel movements.  GENITOURINARY:  No hematuria. No dysuria. No change in urination.   SKIN: No rashes. No itching.   MUSCULOSKELETAL: No muscle aches. No stiffness.  PSYCHIATRIC: No history of depression or anxiety  NEUROLOGICAL: No headaches, dizziness. No change in bowel or bladder control; +numbness in bilateral feet  ENDOCRINOLOGIC: No reports of cold or heat intolerance. No polydipsia  ALLERGIES: No history of asthma, hives, or eczema

## 2023-09-23 NOTE — OCCUPATIONAL THERAPY INITIAL EVALUATION ADULT - ADDITIONAL COMMENTS
Patient has been living in a shelter the past 3 months. There are no steps to enter. She was independent in ADLs and needed assistance with IADLs due to vision loss and to navigate around the community. She lives alone and did not ambulate using a device.

## 2023-09-24 ENCOUNTER — TRANSCRIPTION ENCOUNTER (OUTPATIENT)
Age: 53
End: 2023-09-24

## 2023-09-24 LAB
GLUCOSE BLDC GLUCOMTR-MCNC: 114 MG/DL — HIGH (ref 70–99)
GLUCOSE BLDC GLUCOMTR-MCNC: 125 MG/DL — HIGH (ref 70–99)
GLUCOSE BLDC GLUCOMTR-MCNC: 130 MG/DL — HIGH (ref 70–99)
GLUCOSE BLDC GLUCOMTR-MCNC: 141 MG/DL — HIGH (ref 70–99)

## 2023-09-24 PROCEDURE — 99232 SBSQ HOSP IP/OBS MODERATE 35: CPT

## 2023-09-24 RX ADMIN — ENOXAPARIN SODIUM 40 MILLIGRAM(S): 100 INJECTION SUBCUTANEOUS at 18:06

## 2023-09-24 RX ADMIN — Medication 8 UNIT(S): at 18:06

## 2023-09-24 RX ADMIN — Medication 8 UNIT(S): at 13:11

## 2023-09-24 RX ADMIN — INSULIN GLARGINE 14 UNIT(S): 100 INJECTION, SOLUTION SUBCUTANEOUS at 21:45

## 2023-09-24 NOTE — DISCHARGE NOTE PROVIDER - NSDCCPCAREPLAN_GEN_ALL_CORE_FT
PRINCIPAL DISCHARGE DIAGNOSIS  Diagnosis: Left retinal detachment  Assessment and Plan of Treatment: You came to the hospital with left sided vision loss and were evaluated by ophthalmology who determined you likely have a left sided retinal detachment. They recommended outpatient follow-up within 1 week of discharge with a retina specialist. They may be able to offer you surgical intervention. Because of your already limited vision, physical therapy and occupational therapy were consulted and recommended ___.      SECONDARY DISCHARGE DIAGNOSES  Diagnosis: Diabetes  Assessment and Plan of Treatment: You were found to have an A1C (average blood glucose over 3 months) of 8.5. This shows your diabetes could be better controlled (goal is around 7). You were started on insulin, which is a medication you recently ran out of. Please continue to take insulin as prescribed and follow up with your doctor within 2 weeks of discharge for follow-up.     PRINCIPAL DISCHARGE DIAGNOSIS  Diagnosis: Left retinal detachment  Assessment and Plan of Treatment: You came to the hospital with left sided vision loss and were evaluated by ophthalmology who determined you likely have a left sided retinal detachment. They recommended outpatient follow-up within 1 week of discharge with a retina specialist. They may be able to offer you surgical intervention. Because of your already limited vision, physical therapy and occupational therapy were consulted and recommended ___.      SECONDARY DISCHARGE DIAGNOSES  Diagnosis: Diabetes  Assessment and Plan of Treatment: You were found to have an A1C (average blood glucose over 3 months) of 8.5. This shows your diabetes could be better controlled (goal is around 7). You were started on insulin, and then transitioned to metformin while in the hospital and taught how to use your talking glucometer

## 2023-09-24 NOTE — DISCHARGE NOTE PROVIDER - HOSPITAL COURSE
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 Ophtho evaluated pt and recommending outpt f/u with retina specialist.      Problem/Plan - 1:  ·  Problem: Vision loss of left eye.   ·  Plan: Pt with sudden acute painless vision loss and blurry vision of left eye, with poorly reactive pupils and history of tractional retinal detachment of right eye leading to R eye blindness.  - Ophthalmology eval appreciated, c/f patient's left tractional RD progressing to involve fovea leading to worsening vision along with vit hemorrhage  - no acute ophthalmological intervention currently overnight -- recommending outpt retina specialist f/u with possible surgical evaluation   - admitted in setting of social situation with undomiciled state and now near-blindness  - PT/OT recommend ____     Problem/Plan - 2:  ·  Problem: Type 2 diabetes mellitus.   ·  Plan: History of IDDM with recent insurance switch causing issues regarding obtaining medications  - A1c 8.5 (improved from prior hospitalization of >11)  - supposed to be on Basaglar 17 units QHS and Humalog 10 units TID premeals  - has not used insulin in around a month, reportedly used old leftover Metformin in the interim  - b/l feet numbness likely iso neuropathy from diabetes   - diabetes specialist, nutritionist.  - c/w insulin regimen     Problem/Plan - 3:  ·  Problem: CVA (cerebrovascular accident).   ·  Plan: History of old/chronic infarcts likely from small vessel disease seen on imaging during 2022  - pt reports taking ASA intermittently  - start ASA 81mg daily  - pt non-compliant with high-dose statin therapy, states she saw side effects and possibly had muscle pains with statin. Discussed with patient if willing to try another type of statin or reduced dosage but patient does not want to restart currently.     Problem/Plan - 4:  ·  Problem: Hyperlipidemia.   ·  Plan: - as above, would prefer to be on high-dose statin but pt unamenable currently. 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 Ophtho evaluated pt and recommending outpt f/u with retina specialist.      Problem/Plan - 1:  ·  Problem: Vision loss of left eye.   ·  Plan: Pt with sudden acute painless vision loss and blurry vision of left eye, with poorly reactive pupils and history of tractional retinal detachment of right eye leading to R eye blindness.  - Ophthalmology eval appreciated, c/f patient's left tractional RD progressing to involve fovea leading to worsening vision along with vit hemorrhage  - no acute ophthalmological intervention currently overnight -- recommending outpt retina specialist f/u with possible surgical evaluation   - admitted in setting of social situation with undomiciled state and now near-blindness  - PT/OT recommend training with white walking blind cane   Problem/Plan - 2:  ·  Problem: Type 2 diabetes mellitus.   ·  Plan: History of IDDM with recent insurance switch causing issues regarding obtaining medications  - A1c 8.5 (improved from prior hospitalization of >11)  - supposed to be on Basaglar 17 units QHS and Humalog 10 units TID premeals  - has not used insulin in around a month, reportedly used old leftover Metformin in the interim  - b/l feet numbness likely iso neuropathy from diabetes   - diabetes specialist, nutritionist.  - c/w insulin regimen     Problem/Plan - 3:  ·  Problem: CVA (cerebrovascular accident).   ·  Plan: History of old/chronic infarcts likely from small vessel disease seen on imaging during 2022  - pt reports taking ASA intermittently  - start ASA 81mg daily  - pt non-compliant with high-dose statin therapy, states she saw side effects and possibly had muscle pains with statin. Discussed with patient if willing to try another type of statin or reduced dosage but patient does not want to restart currently.     Problem/Plan - 4:  ·  Problem: Hyperlipidemia.   ·  Plan: - as above, would prefer to be on high-dose statin but pt unamenable currently.    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        Problem/Plan - 1:  ·  Problem: Vision loss of left eye.   ·  Plan: Pt with sudden acute painless vision loss and blurry vision of left eye, with poorly reactive pupils and history of tractional retinal detachment of right eye leading to R eye blindness.  - Ophthalmology eval appreciated, c/f patient's left tractional RD progressing to involve fovea leading to worsening vision along with vit hemorrhage  - no acute ophthalmological intervention-- recommending outpt retina specialist f/u with possible surgical evaluation   - admitted in setting of social situation with un-domiciled state and now near-blindness  -f/u ophthal recs  -outpt f/u with opthal.     Problem/Plan - 2:  ·  Problem: Type 2 diabetes mellitus.   ·  Plan: History of IDDM with recent insurance switch causing issues regarding obtaining medications. Also with new acute blindness, patient is not comfortable injecting insulin  - A1c 8.5 (improved from prior hospitalization of >11). Partly due to decreased PO intake in recent months.   - has not used insulin in recent months reportedly used old leftover Metformin in the interim  -c/w current basal/bolus coverage while patient is in the hospital; pre meal now stopped due to metformin  -on discharge, will start metformin and Jardiance (medicine delivered to her bedside)  pharmacist working with pt to use glucometer    now on metformin 500mg daily, will monitor loose stools, if persists to check stool studies- reports no further loose stools.     Problem/Plan - 3:  ·  Problem: CVA (cerebrovascular accident).   ·  Plan: History of old/chronic infarcts likely from small vessel disease seen on imaging during 2022  - pt reports taking ASA intermittently  - c/w ASA 81mg daily  - pt non-compliant with high-dose statin therapy, states she saw side effects and possibly had muscle pains with statin. Discussed with patient if willing to try another type of statin or reduced dosage but patient does not want to restart currently.     Problem/Plan - 4:  ·  Problem: Hyperlipidemia.   ·  Plan: - as above, would recommend to be on high-dose statin but pt not amenable currently.     Problem/Plan - 5:  ·  Problem: Eye pain, right.   ·  Plan: - associated with HA  - ophtho to re-eval, no further interventions; recommend artificial tears.     Problem/Plan - 6:  ·  Problem: Tension headache.   ·  Plan: - now improved with pain medication, started on tramadol   CT neg  tylenol helps will continue   does not appear to have temporal tenderness  mildly elevated ESR is non specific, CRP neg, low suspicion for TA.     Pt to f/u with pcp and ophthalmologist as outpt, stable for dc

## 2023-09-24 NOTE — DISCHARGE NOTE PROVIDER - NSDCMRMEDTOKEN_GEN_ALL_CORE_FT
Basaglar KwikPen 100 units/mL subcutaneous solution: 17 unit(s) subcutaneous once a day  HumaLOG KwikPen 100 units/mL injectable solution: 10 unit(s) subcutaneous 3 times a day (with meals)   Basaglar KwikPen 100 units/mL subcutaneous solution: 17 unit(s) subcutaneous once a day  Freestyle Anthony 3 Era: Dispense 1 reader  Freestyle Anthony 3 Sensors: Apply 1 sensor every 14 days  HumaLOG KwikPen 100 units/mL injectable solution: 10 unit(s) subcutaneous 3 times a day (with meals)  Jardiance 10 mg oral tablet: 1 tab(s) orally  metFORMIN 1000 mg oral tablet: 1 tab(s) orally   Freestyle Anthony 3 [place]: Dispense 1 reader  Freestyle Anthony 3 Sensors: Apply 1 sensor every 14 days  Jardiance 10 mg oral tablet: 1 tab(s) orally  Jardiance 10 mg oral tablet: 1 tab(s) orally  metFORMIN 1000 mg oral tablet: 1 tab(s) orally  metFORMIN 1000 mg oral tablet: 1 tab(s) orally   alcohol swabs: Apply topically to affected area 4 times a day  Blind mobility stick: Blind Mobility stick  Jardiance 10 mg oral tablet: 1 tab(s) orally  Jardiance 10 mg oral tablet: 1 tab(s) orally  lancets: 1 application subcutaneously 4 times a day  metFORMIN 1000 mg oral tablet: 1 tab(s) orally  metFORMIN 1000 mg oral tablet: 1 tab(s) orally  TALKING glucometer (per patient&#x27;s insurance): Test blood sugars four times a day. Dispense #1 glucometer.  test strips (per patient&#x27;s insurance): 1 application subcutaneously 4 times a day. ** Compatible with patient&#x27;s glucometer **   alcohol swabs: Apply topically to affected area 4 times a day  aspirin 81 mg oral tablet, chewable: 1 tab(s) orally once a day  Blind mobility stick: Blind Mobility stick  cyclobenzaprine 5 mg oral tablet: 1 tab(s) orally 3 times a day As needed Muscle Spasm  Jardiance 10 mg oral tablet: 1 tab(s) orally  lactobacillus acidophilus oral capsule: 1 tab(s) orally once a day  lactulose 10 g/15 mL oral syrup: 15 milliliter(s) orally 2 times a day  lancets: 1 application subcutaneously 4 times a day  lidocaine 4% topical film: Apply topically to affected area every 24 hours Keep applied for 12 hours and removed for 12 hours before next application  metFORMIN 1000 mg oral tablet: 1 tab(s) orally  ocular lubricant ophthalmic solution: 2 drop(s) to each affected eye 4 times a day  senna leaf extract oral tablet: 2 tab(s) orally once a day (at bedtime)  TALKING glucometer (per patient&#x27;s insurance): Test blood sugars four times a day. Dispense #1 glucometer.  test strips (per patient&#x27;s insurance): 1 application subcutaneously 4 times a day. ** Compatible with patient&#x27;s glucometer **  traMADol 50 mg oral tablet: 0.5 tab(s) orally every 6 hours As needed Severe Pain (7 - 10)

## 2023-09-24 NOTE — PROGRESS NOTE ADULT - SUBJECTIVE AND OBJECTIVE BOX
[facility] Department of Hospital Medicine  Mandi Martínez DO  Available on MS Teams  Pager: [phone]    Patient is a 52y old  Female who presents with a chief complaint of L eye vision loss (23 Sep 2023 14:20)    Subjective:  Pt seen and examined at bedside very anxious and tearful. Very concerned about her vision loss. Still does not know how she will manage on her own with current limitations.     Vital Signs Last 24 Hrs  T(C): 36.6 (24 Sep 2023 13:56), Max: 36.7 (24 Sep 2023 06:30)  T(F): 97.8 (24 Sep 2023 13:56), Max: 98.1 (24 Sep 2023 06:30)  HR: 89 (24 Sep 2023 13:56) (87 - 89)  BP: 128/66 (24 Sep 2023 13:56) (128/66 - 159/90)  BP(mean): --  RR: 18 (24 Sep 2023 13:56) (17 - 18)  SpO2: 99% (24 Sep 2023 13:56) (99% - 100%)    Parameters below as of 24 Sep 2023 13:56  Patient On (Oxygen Delivery Method): room air    PHYSICAL EXAM:  Constitutional: resting comfortably in bed; NAD  Head: NC/AT  Eyes: PERRL, anicteric sclera; R eye minimal vision; L eye can see shadows/light, can not tell me how many fingers being held up  ENT: no nasal discharge; MMM  Neck: supple; no JVD  Respiratory: CTA B/L; no W/R/R  Cardiac: +S1/S2; RRR; no M/R/G  Gastrointestinal: soft, NT/ND; no rebound or guarding; +BSx4  Extremities: WWP, no clubbing or cyanosis; no peripheral edema  Musculoskeletal: NROM x4; no joint swelling, tenderness or erythema  Vascular: 2+ radial, DP/PT pulses B/L  Dermatologic: skin warm, dry and intact; no rashes, wounds, or scars  Neurologic: AAOx3; CNII-XII grossly intact; no focal deficits  Psychiatric: affect and characteristics of appearance, verbalizations, behaviors are appropriate    MEDICATIONS  (STANDING):  dextrose 5%. 1000 milliLiter(s) (100 mL/Hr) IV Continuous <Continuous>  dextrose 5%. 1000 milliLiter(s) (50 mL/Hr) IV Continuous <Continuous>  dextrose 50% Injectable 25 Gram(s) IV Push once  dextrose 50% Injectable 25 Gram(s) IV Push once  dextrose 50% Injectable 12.5 Gram(s) IV Push once  enoxaparin Injectable 40 milliGRAM(s) SubCutaneous every 24 hours  glucagon  Injectable 1 milliGRAM(s) IntraMuscular once  influenza   Vaccine 0.5 milliLiter(s) IntraMuscular once  insulin glargine Injectable (LANTUS) 14 Unit(s) SubCutaneous at bedtime  insulin lispro (ADMELOG) corrective regimen sliding scale   SubCutaneous three times a day before meals  insulin lispro (ADMELOG) corrective regimen sliding scale   SubCutaneous at bedtime  insulin lispro Injectable (ADMELOG) 8 Unit(s) SubCutaneous three times a day before meals    MEDICATIONS  (PRN):  acetaminophen     Tablet .. 650 milliGRAM(s) Oral every 6 hours PRN Temp greater or equal to 38C (100.4F), Mild Pain (1 - 3)  dextrose Oral Gel 15 Gram(s) Oral once PRN Blood Glucose LESS THAN 70 milliGRAM(s)/deciliter  melatonin 3 milliGRAM(s) Oral at bedtime PRN Insomnia      LABS:                        10.5   6.29  )-----------( 170      ( 23 Sep 2023 09:21 )             33.2     09-23    138  |  102  |  21  ----------------------------<  176<H>  4.8   |  28  |  0.96    Ca    9.0      23 Sep 2023 09:21  Phos  3.4     09-23  Mg     2.00     09-23    TPro  7.3  /  Alb  4.2  /  TBili  <0.2  /  DBili  x   /  AST  11  /  ALT  12  /  AlkPhos  115  09-22      Urinalysis Basic - ( 23 Sep 2023 09:21 )    Color: x / Appearance: x / SG: x / pH: x  Gluc: 176 mg/dL / Ketone: x  / Bili: x / Urobili: x   Blood: x / Protein: x / Nitrite: x   Leuk Esterase: x / RBC: x / WBC x   Sq Epi: x / Non Sq Epi: x / Bacteria: x      CAPILLARY BLOOD GLUCOSE      POCT Blood Glucose.: 133 mg/dL (23 Sep 2023 17:05)      RADIOLOGY & ADDITIONAL TESTS: Reviewed.

## 2023-09-24 NOTE — DISCHARGE NOTE PROVIDER - NSFOLLOWUPCLINICS_GEN_ALL_ED_FT
[facility] Ophthalmology  Ophthalmology  [address] [place] [address], [facility] [address]  [place] [address]  Phone: [phone]  Fax:

## 2023-09-24 NOTE — DISCHARGE NOTE PROVIDER - NSDCFUADDAPPT_GEN_ALL_CORE_FT
Continue medications as prescribed. Follow up with your primary care doctor and ophthalmologist for further evaluation and management. Please call to make an appointment within 1-2 weeks of discharge.     Follow up with Ophthalmologist appointment. You will be given a call to establish an appointment with a retinal specialist.

## 2023-09-24 NOTE — DISCHARGE NOTE PROVIDER - CARE PROVIDER_API CALL
Kate Tate  Internal Medicine  [address] [place] [address]  Phone: [phone]  Fax: [phone]  Follow Up Time:

## 2023-09-25 DIAGNOSIS — Z13.6 ENCOUNTER FOR SCREENING FOR CARDIOVASCULAR DISORDERS: ICD-10-CM

## 2023-09-25 LAB
GLUCOSE BLDC GLUCOMTR-MCNC: 115 MG/DL — HIGH (ref 70–99)
GLUCOSE BLDC GLUCOMTR-MCNC: 155 MG/DL — HIGH (ref 70–99)
GLUCOSE BLDC GLUCOMTR-MCNC: 181 MG/DL — HIGH (ref 70–99)
GLUCOSE BLDC GLUCOMTR-MCNC: 210 MG/DL — HIGH (ref 70–99)

## 2023-09-25 PROCEDURE — 99222 1ST HOSP IP/OBS MODERATE 55: CPT

## 2023-09-25 PROCEDURE — 99221 1ST HOSP IP/OBS SF/LOW 40: CPT

## 2023-09-25 PROCEDURE — 99233 SBSQ HOSP IP/OBS HIGH 50: CPT

## 2023-09-25 RX ADMIN — Medication 8 UNIT(S): at 12:16

## 2023-09-25 RX ADMIN — Medication 1: at 12:17

## 2023-09-25 RX ADMIN — Medication 1: at 08:44

## 2023-09-25 RX ADMIN — Medication 8 UNIT(S): at 17:33

## 2023-09-25 RX ADMIN — INSULIN GLARGINE 14 UNIT(S): 100 INJECTION, SOLUTION SUBCUTANEOUS at 22:11

## 2023-09-25 RX ADMIN — Medication 8 UNIT(S): at 08:43

## 2023-09-25 NOTE — PROGRESS NOTE ADULT - SUBJECTIVE AND OBJECTIVE BOX
[facility] DEPARTMENT OF OPHTHALMOLOGY  ------------------------------------------------------------------------------  Tania Neville MD PGY 2  Contact via [employer] Teams  ------------------------------------------------------------------------------    Interval History: No acute events overnight.    MEDICATIONS  (STANDING):  dextrose 5%. 1000 milliLiter(s) (50 mL/Hr) IV Continuous <Continuous>  dextrose 5%. 1000 milliLiter(s) (100 mL/Hr) IV Continuous <Continuous>  dextrose 50% Injectable 25 Gram(s) IV Push once  dextrose 50% Injectable 12.5 Gram(s) IV Push once  dextrose 50% Injectable 25 Gram(s) IV Push once  enoxaparin Injectable 40 milliGRAM(s) SubCutaneous every 24 hours  glucagon  Injectable 1 milliGRAM(s) IntraMuscular once  influenza   Vaccine 0.5 milliLiter(s) IntraMuscular once  insulin glargine Injectable (LANTUS) 14 Unit(s) SubCutaneous at bedtime  insulin lispro (ADMELOG) corrective regimen sliding scale   SubCutaneous three times a day before meals  insulin lispro (ADMELOG) corrective regimen sliding scale   SubCutaneous at bedtime  insulin lispro Injectable (ADMELOG) 8 Unit(s) SubCutaneous three times a day before meals    MEDICATIONS  (PRN):  acetaminophen     Tablet .. 650 milliGRAM(s) Oral every 6 hours PRN Temp greater or equal to 38C (100.4F), Mild Pain (1 - 3)  dextrose Oral Gel 15 Gram(s) Oral once PRN Blood Glucose LESS THAN 70 milliGRAM(s)/deciliter  melatonin 3 milliGRAM(s) Oral at bedtime PRN Insomnia      VITALS: T(C): 36.7 (09-25-23 @ 05:52)  T(F): 98.1 (09-25-23 @ 05:52), Max: 98.2 (09-24-23 @ 21:35)  HR: 90 (09-25-23 @ 05:52) (89 - 96)  BP: 141/78 (09-25-23 @ 05:52) (128/66 - 147/86)  RR:  (17 - 18)  SpO2:  (96% - 100%)  Wt(kg): --  General: AAO x 3, appropriate mood and affect      Ophthalmology Exam:  Visual acuity (sc): LP OD, HM OS  Pupils: Poorly reactive OU, difficult to assess but possible RAPD OD?  Ttono: 6 OD, 5 OS  Extraocular movements (EOMs): Full OU, no pain, no diplopia  Confrontational Visual Field (CVF): DOLLY  Color Plates: DOLLY    Slit Lamp Exam (SLE)  External: Flat OU  Lids/Lashes/Lacrimal Ducts: Flat OU    Sclera/Conjunctiva: W+Q OU  Cornea: Cl OU  Anterior Chamber: D+F OU    Iris: Flat OU  Lens: PCIOL OU    Fundus Exam: dilated with 1% tropicamide and 2.5% phenylephrine  Approval obtained from primary team for dilation  Patient aware that pupils can remained dilated for at least 4-6 hours  Exam performed with 20D lens    Vitreous: wnl OD, +montilla sign and vitreous hemorrhage OS  Disc, cup/disc: DOLLY 2/2 dense fibrosis OU  Macula: DOLLY 2/2 dense fibrosis OU  Vessels: sclerotic OU  Periphery: dense fibrosis OU, laser scars seen OS    B-scan performed in ED showed dense fibrosis OU and TRD OU.    Labs/Imaging:  ***   [facility] DEPARTMENT OF OPHTHALMOLOGY  ------------------------------------------------------------------------------  Tania Neville MD PGY 2  Contact via [employer] Teams  ------------------------------------------------------------------------------    Interval History: No acute events overnight.    MEDICATIONS  (STANDING):  dextrose 5%. 1000 milliLiter(s) (50 mL/Hr) IV Continuous <Continuous>  dextrose 5%. 1000 milliLiter(s) (100 mL/Hr) IV Continuous <Continuous>  dextrose 50% Injectable 25 Gram(s) IV Push once  dextrose 50% Injectable 12.5 Gram(s) IV Push once  dextrose 50% Injectable 25 Gram(s) IV Push once  enoxaparin Injectable 40 milliGRAM(s) SubCutaneous every 24 hours  glucagon  Injectable 1 milliGRAM(s) IntraMuscular once  influenza   Vaccine 0.5 milliLiter(s) IntraMuscular once  insulin glargine Injectable (LANTUS) 14 Unit(s) SubCutaneous at bedtime  insulin lispro (ADMELOG) corrective regimen sliding scale   SubCutaneous three times a day before meals  insulin lispro (ADMELOG) corrective regimen sliding scale   SubCutaneous at bedtime  insulin lispro Injectable (ADMELOG) 8 Unit(s) SubCutaneous three times a day before meals    MEDICATIONS  (PRN):  acetaminophen     Tablet .. 650 milliGRAM(s) Oral every 6 hours PRN Temp greater or equal to 38C (100.4F), Mild Pain (1 - 3)  dextrose Oral Gel 15 Gram(s) Oral once PRN Blood Glucose LESS THAN 70 milliGRAM(s)/deciliter  melatonin 3 milliGRAM(s) Oral at bedtime PRN Insomnia      VITALS: T(C): 36.7 (09-25-23 @ 05:52)  T(F): 98.1 (09-25-23 @ 05:52), Max: 98.2 (09-24-23 @ 21:35)  HR: 90 (09-25-23 @ 05:52) (89 - 96)  BP: 141/78 (09-25-23 @ 05:52) (128/66 - 147/86)  RR:  (17 - 18)  SpO2:  (96% - 100%)  Wt(kg): --  General: AAO x 3, appropriate mood and affect      Ophthalmology Exam:  Visual acuity (sc): LP OD, HM OS  Pupils: Poorly reactive OU, difficult to assess but possible RAPD OD?  Ttono: 6 OD, 5 OS  Extraocular movements (EOMs): Full OU, no pain, no diplopia  Confrontational Visual Field (CVF): DOLLY  Color Plates: DOLLY    Slit Lamp Exam (SLE)  External: Flat OU  Lids/Lashes/Lacrimal Ducts: Flat OU    Sclera/Conjunctiva: W+Q OU  Cornea: Cl OU  Anterior Chamber: D+F OU    Iris: Flat OU  Lens: PCIOL OU    Fundus Exam: dilated with 1% tropicamide and 2.5% phenylephrine  Approval obtained from primary team for dilation  Patient aware that pupils can remained dilated for at least 4-6 hours  Exam performed with 20D lens    Vitreous: wnl OD, +montilla sign and vitreous hemorrhage OS  Disc, cup/disc: DOLLY 2/2 dense fibrosis OU  Macula: DOLLY 2/2 dense fibrosis OU  Vessels: sclerotic OU  Periphery: dense fibrosis OU, laser scars seen OS    B-scan performed in ED showed dense fibrosis OU and TRD OU.

## 2023-09-25 NOTE — PROGRESS NOTE ADULT - SUBJECTIVE AND OBJECTIVE BOX
[facility] Department of Hospital Medicine  Dr. Jocelyne Valdez  Pager: [phone]    Patient is a 52y old  Female who presents with a chief complaint of L eye vision loss (23 Sep 2023 14:20)    Subjective:  Pt seen and examined. Reports she can only see shapes and lights currently. States that she has been on insulin for long time. Reports her A1c was as high as 14 at one point. She has [facility] living at the shelter for last 3 weeks and has not been using insulin. She says that she also does not eat much as food is not good at shelter. She does not have a place to go after released from hospital. Also does not feel comfortable injecting herself with insulin.     Vital Signs Last 24 Hrs  T(C): 36.8 (25 Sep 2023 11:15), Max: 36.8 (24 Sep 2023 21:35)  T(F): 98.3 (25 Sep 2023 11:15), Max: 98.3 (25 Sep 2023 11:15)  HR: 92 (25 Sep 2023 11:15) (89 - 96)  BP: 144/87 (25 Sep 2023 11:15) (128/66 - 147/86)  BP(mean): --  RR: 17 (25 Sep 2023 11:15) (17 - 18)  SpO2: 100% (25 Sep 2023 11:15) (96% - 100%)    Parameters below as of 25 Sep 2023 11:15  Patient On (Oxygen Delivery Method): room air      PHYSICAL EXAM:  Constitutional: resting comfortably in bed; NAD  Head: NC/AT  Eyes: PERRL, anicteric sclera; R eye minimal vision; L eye can see shadows/light  ENT: no nasal discharge; MMM  Neck: supple; no JVD  Respiratory: CTA B/L; no W/R/R  Cardiac: +S1/S2; RRR; no M/R/G  Gastrointestinal: soft, NT/ND; no rebound or guarding; +BSx4  Extremities: WWP, no clubbing or cyanosis; no peripheral edema  Musculoskeletal: NROM x4; no joint swelling, tenderness or erythema  Vascular: 2+ radial, DP/PT pulses B/L  Dermatologic: skin warm, dry and intact; no rashes, wounds, or scars  Neurologic: AAOx3; CNII-XII grossly intact; no focal deficits  Psychiatric: affect and characteristics of appearance, verbalizations, behaviors are appropriate    MEDICATIONS  (STANDING):  dextrose 5%. 1000 milliLiter(s) (100 mL/Hr) IV Continuous <Continuous>  dextrose 5%. 1000 milliLiter(s) (50 mL/Hr) IV Continuous <Continuous>  dextrose 50% Injectable 25 Gram(s) IV Push once  dextrose 50% Injectable 25 Gram(s) IV Push once  dextrose 50% Injectable 12.5 Gram(s) IV Push once  enoxaparin Injectable 40 milliGRAM(s) SubCutaneous every 24 hours  glucagon  Injectable 1 milliGRAM(s) IntraMuscular once  influenza   Vaccine 0.5 milliLiter(s) IntraMuscular once  insulin glargine Injectable (LANTUS) 14 Unit(s) SubCutaneous at bedtime  insulin lispro (ADMELOG) corrective regimen sliding scale   SubCutaneous three times a day before meals  insulin lispro (ADMELOG) corrective regimen sliding scale   SubCutaneous at bedtime  insulin lispro Injectable (ADMELOG) 8 Unit(s) SubCutaneous three times a day before meals    MEDICATIONS  (PRN):  acetaminophen     Tablet .. 650 milliGRAM(s) Oral every 6 hours PRN Temp greater or equal to 38C (100.4F), Mild Pain (1 - 3)  dextrose Oral Gel 15 Gram(s) Oral once PRN Blood Glucose LESS THAN 70 milliGRAM(s)/deciliter  melatonin 3 milliGRAM(s) Oral at bedtime PRN Insomnia      LABS:                 CAPILLARY BLOOD GLUCOSE      POCT Blood Glucose.: 133 mg/dL (23 Sep 2023 17:05)      RADIOLOGY & ADDITIONAL TESTS: Reviewed.

## 2023-09-25 NOTE — CONSULT NOTE ADULT - ATTENDING COMMENTS
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 Endocrinology consulted for outpatient medication recommendations in the setting of the patient's complex social history. Patient's HgbA1c previously >11%. She was on basal/bolus at the time. She mentions that since then she has been doing intermittent fasting and got herself off insulin. She has been using her friend's medication in the interim. She doesn't know the name but she mentions that it is not MFM. It has been giving her low blood sugars (?sulfonylurea?). She has been off insulin for about 3 months and her A1c is now 8.5%. Given significant reduction in A1c, reasonable to keep patient on oral agents. Would check if patient can get MFM 500mg daily (once a day with meals) and Jardiance 10mg daily. She should follow up outpatient with

## 2023-09-25 NOTE — CONSULT NOTE ADULT - SUBJECTIVE AND OBJECTIVE BOX
[facility] DEPARTMENT OF OPHTHALMOLOGY - INITIAL ADULT CONSULT  ----------------------------------------------------------------------------------------------------  Ronan Garcia MD PGY-2  Available on teams  ----------------------------------------------------------------------------------------------------    HPI:    Interval History: Pt states vision suddenly worsened OS today when she was shopping. Per [facility] notes, pt's vision was 20/70 OS last month, and has worsened to HM today. pt had some headaches last week, but denies any scalp tenderness or jaw claudication. No new neurologic symptoms (has chronic tingling in her limbs) and no new joint pains.     PAST MEDICAL & SURGICAL HISTORY:  DM2 (diabetes mellitus, type 2)      No significant past surgical history        Past Ocular History: PDR with NVE OU s/p multiple injections and laser. OD Mac off TRD. TRD OS involving nerve and arcades prior. s/p CE/PCIOL OU.    Ophthalmic Medications: none  FAMILY HISTORY:    Social History: lives in a shelter    MEDICATIONS  (STANDING):    MEDICATIONS  (PRN):    Allergies & Intolerances:   No Known Allergies    Review of Systems:  Constitutional: No fever, chills  Eyes: Poor vision OU  Neuro: No tremors  Cardiovascular: No chest pain, palpitations  Respiratory: No SOB, no cough  GI: No nausea, vomiting, abdominal pain  : No dysuria  Skin: no rash  Psych: no depression  Endocrine: no polyuria, polydipsia  Heme/lymph: no swelling    VITALS: T(C): 36.9 (09-22-23 @ 20:12)  T(F): 98.5 (09-22-23 @ 20:12), Max: 98.9 (09-22-23 @ 19:09)  HR: 93 (09-22-23 @ 20:12) (92 - 93)  BP: 125/67 (09-22-23 @ 20:12) (125/67 - 153/82)  RR:  (18 - 18)  SpO2:  (99% - 100%)  Wt(kg): --  General: AAO x 3, appropriate mood and affect    Ophthalmology Exam:  Visual acuity (sc): LP OD, HM OS  Pupils: Poorly reactive OU, difficult to assess but possible RAPD OD?  Ttono: 6 OD, 5 OS  Extraocular movements (EOMs): Full OU, no pain, no diplopia  Confrontational Visual Field (CVF): DOLLY  Color Plates: DOLLY    Slit Lamp Exam (SLE)  External: Flat OU  Lids/Lashes/Lacrimal Ducts: Flat OU    Sclera/Conjunctiva: W+Q OU  Cornea: Cl OU  Anterior Chamber: D+F OU    Iris: Flat OU  Lens: PCIOL OU    Fundus Exam: dilated with 1% tropicamide and 2.5% phenylephrine  Approval obtained from primary team for dilation  Patient aware that pupils can remained dilated for at least 4-6 hours  Exam performed with 20D lens    Vitreous: wnl OD, +montilla sign and vitreous hemorrhage OS  Disc, cup/disc: DOLLY 2/2 dense fibrosis OU  Macula: DOLLY 2/2 dense fibrosis OU  Vessels: sclerotic OU  Periphery: dense fibrosis OU, laser scars seen OS    B-scan performed in ED showed dense fibrosis OU and TRD OU.    Labs/Imaging:  ***  
NOTE INCOMPLETE/ IN PROGRESS  *Please wait for attending attestation for official recommendations.     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 Patient states she first noticed it a couple of hours prior to arrival to ED. She was entering a store when she suddenly noticed loss of vision, and needed help from bystanders to direct her to the check-out. She endorses only being able to see hand motion and silhouettes currently, denies double vision. Patient normally follows at [facility] and reports her vision was 20/70 at the time in her left eye in Aug 2023. Patient also endorses numbness in bilateral feet and chronic balance issues and endorses having not taken her insulin for the past month. Patient denies fevers/chills, headaches/dizziness, chest pain, difficulty breathing, abdominal pain, dysuria, diarrhea.     In the ED, patient with vitals T 98.9F, HR 92, /67, and O2 saturation of 99% on RA with RR 18. Labs notable for anemia and A1c 8.5. CT abd/pelvis obtained with colonic diverticulosis without diverticulitis. Received 5 units of Lantus in the ED.  (23 Sep 2023 04:44)    The endocrinology team has been consulted for discharge recommendations given the patient's recent blindness and undomiciled status. The patient has a history of type II DM for which she has taken insulin for years. The patient was previously on 17 units of basaglar at bedtime and 10 units of premeal insulin. Even on this regimen, the patient states her A1C was often out of control, raising as high as 14 in the past. On the patient's last admission to [facility] in 11/22, her A1C was 11.5%.     Over the past month, the patient has been unable to take her insulin. The patient's medicaid coverage recently lapsed and she had to enroll in [facility] [employer] as transitional coverage while she reapplies to Medicaid. The patient's primary care physician no longer took her insurance and the patient is still in the process of finding a new PCP. The patient is recently undomiciled and has been living in a shelter for the past month. While in the shelter the patient has had poor PO intake due to the poor food quality within her living conditions. The patient has been taking her friend's diabetic medication while in the shelter, though she does not know the dosage or the drug she was supplementing with. The patient suspects it is metformin but also reported subjective episodes of hypoglycemia while on the medication, and this is atypical for metformin regimens. Upon admission to the hospital, the patient's A1C was 8.5%.    Since admission, the patient was initiated on 80% of her outpatient basal-bolus regimen, and her fingersticks has been within acceptable limits. The patient does not have a PCP or an endocrinologist. Although she has interest in following up with [facility] for both, she has stated concern about being able to attend appointments due to her condition. The patient does not have any diabetic supplies and has not been able to check her fingersticks, and she does not know what her usual sugars have been for the past month. The patient has expressed a need for a blind-compatible glucometer and does not feel comfortable continuing with an insulin regimen at this time.        PAST MEDICAL & SURGICAL HISTORY:  DM2 (diabetes mellitus, type 2)    HLD (hyperlipidemia)    Blindness of right eye      No significant past surgical history        FAMILY HISTORY:  No pertinent family history in first degree relatives      Social History: Patient is undomiciled and recently lost vision in both eyes. There is significant concern as to whether the patient is capable of maintaining an insulin regimen on her own.    Outpatient Medications:  Basaglar KwikPen 100 units/mL subcutaneous solution: Last Dose Taken:  , 17 unit(s) subcutaneous once a day  HumaLOG KwikPen 100 units/mL injectable solution: Last Dose Taken:  , 10 unit(s) subcutaneous 3 times a day (with meals      MEDICATIONS  (STANDING):  dextrose 5%. 1000 milliLiter(s) (50 mL/Hr) IV Continuous <Continuous>  dextrose 5%. 1000 milliLiter(s) (100 mL/Hr) IV Continuous <Continuous>  dextrose 50% Injectable 25 Gram(s) IV Push once  dextrose 50% Injectable 25 Gram(s) IV Push once  dextrose 50% Injectable 12.5 Gram(s) IV Push once  enoxaparin Injectable 40 milliGRAM(s) SubCutaneous every 24 hours  glucagon  Injectable 1 milliGRAM(s) IntraMuscular once  influenza   Vaccine 0.5 milliLiter(s) IntraMuscular once  insulin glargine Injectable (LANTUS) 14 Unit(s) SubCutaneous at bedtime  insulin lispro (ADMELOG) corrective regimen sliding scale   SubCutaneous at bedtime  insulin lispro (ADMELOG) corrective regimen sliding scale   SubCutaneous three times a day before meals  insulin lispro Injectable (ADMELOG) 8 Unit(s) SubCutaneous three times a day before meals    MEDICATIONS  (PRN):  acetaminophen     Tablet .. 650 milliGRAM(s) Oral every 6 hours PRN Temp greater or equal to 38C (100.4F), Mild Pain (1 - 3)  dextrose Oral Gel 15 Gram(s) Oral once PRN Blood Glucose LESS THAN 70 milliGRAM(s)/deciliter  melatonin 3 milliGRAM(s) Oral at bedtime PRN Insomnia      Allergies    No Known Allergies    Intolerances      Review of Systems:  Constitutional: No fever  Eyes: See HPI  Neuro: Loss of sensation in b/l feet  HEENT: No pain  Cardiovascular: No chest pain, palpitations  Respiratory: No SOB, no cough  GI: No nausea, vomiting, abdominal pain  : No dysuria  Skin: no rash  Psych: no depression  Endocrine: no polyuria, polydipsia  Hem/lymph: no swelling  Osteoporosis: no fractures    ALL OTHER SYSTEMS REVIEWED AND NEGATIVE      PHYSICAL EXAM:  VITALS: T(C): 36.8 (09-25-23 @ 11:15)  T(F): 98.3 (09-25-23 @ 11:15), Max: 98.3 (09-25-23 @ 11:15)  HR: 92 (09-25-23 @ 11:15) (89 - 96)  BP: 144/87 (09-25-23 @ 11:15) (128/66 - 147/86)  RR:  (17 - 18)  SpO2:  (96% - 100%)  Wt(kg): --  GENERAL: NAD, well-groomed, well-developed  EYES: No proptosis, no lid lag, anicteric  HEENT:  Atraumatic, Normocephalic, moist mucous membranes  THYROID: Normal size, no palpable nodules  RESPIRATORY: Clear to auscultation bilaterally; No rales, rhonchi, wheezing  CARDIOVASCULAR: Regular rate and rhythm; No murmurs; no peripheral edema  GI: Soft, nontender, non distended, normal bowel sounds  SKIN: Dry, intact, No rashes or lesions  MUSCULOSKELETAL: Full range of motion, normal strength  NEURO: extraocular movements intact, no tremor; sensation 1/5 in b/l feet, proprioception limited  PSYCH: Alert and oriented x 3, normal affect, normal mood  CUSHING'S SIGNS: no striae      CAPILLARY BLOOD GLUCOSE      POCT Blood Glucose.: 181 mg/dL (25 Sep 2023 12:03)  POCT Blood Glucose.: 155 mg/dL (25 Sep 2023 08:35)  POCT Blood Glucose.: 114 mg/dL (24 Sep 2023 21:16)  POCT Blood Glucose.: 125 mg/dL (24 Sep 2023 17:07)                            10.5   6.29  )-----------( 170      ( 23 Sep 2023 09:21 )             33.2       09-23    138  |  102  |  21  ----------------------------<  176<H>  4.8   |  28  |  0.96    eGFR: 71    Ca    9.0      09-23  Mg     2.00     09-23  Phos  3.4     09-23    TPro  7.3  /  Alb  4.2  /  TBili  <0.2  /  DBili  x   /  AST  11  /  ALT  12  /  AlkPhos  115  09-22      Thyroid Function Tests:  09-23 @ 09:21 TSH 0.79 FreeT4 -- T3 -- Anti TPO -- Anti Thyroglobulin Ab -- TSI --      A1C with Estimated Average Glucose Result: 8.5 % (09-22-23 @ 21:05)  A1C with Estimated Average Glucose Result: 11.5 % (11-22-22 @ 10:40)          Radiology:

## 2023-09-25 NOTE — PROGRESS NOTE ADULT - ASSESSMENT
52y female with a past medical history/ocular history of PDR with TRD OU s/p injections and laser, last seen at [facility] in August 2023 and was lost to follow up consulted for acute worsening of left eye vision, found to have vitreous hemorrhage and retinal detachment of the left eye.    INCOMPLETE    #PDR OU  #TRD OU  #Vitreous hemorrhage OS  - Pt's vision in the left eye worsened from 20/70 in August 2023 when last seen at [facility] to  today in the ED  - On exam, pt was found to have vit heme in the left eye along with tractional RD and dense fibrosis  - Possible that pt's RD progressed to involve the fovea, resulting in worsening of vision along with the vit heme worsening her vision  - No acute ophthalmologic intervention at this time  - Remainder of management per primary team  - Ophthalmology will follow    Discussed with Dr. Reyes, chief.    Outpatient Follow-up: Patient should follow-up with his/her ophthalmologist or with [facility] Department of Ophthalmology within 1 week of after discharge at:    [address]  [place] [address]  [phone]    Ronan Garcia MD, PGY-2  Also available on [employer] Teams     52y female with a past medical history/ocular history of PDR with TRD OU s/p injections and laser, last seen at [facility] in August 2023 and was lost to follow up consulted for acute worsening of left eye vision, found to have vitreous hemorrhage and retinal detachment of the left eye.      #PDR OU  #TRD OU  #Vitreous hemorrhage OS  - Pt's vision in the left eye worsened from 20/70 in August 2023 when last seen at [facility] to  today in the ED  - On exam, pt was found to have vit heme in the left eye along with tractional RD and dense fibrosis  - Possible that pt's RD progressed to involve the fovea, resulting in worsening of vision along with the vit heme worsening her vision  - No acute ophthalmologic intervention at this time  - Remainder of management per primary team  - Should follow up with retina specialist upon discharge    SDW Dr. Mejía    Outpatient Follow-up: Patient should follow-up with his/her ophthalmologist or with [facility] Department of Ophthalmology within 1 week of after discharge at:    [address]  [place] [address]  [phone]    Ronan Garcia MD, PGY-2  Also available on [employer] Teams

## 2023-09-25 NOTE — CONSULT NOTE ADULT - ASSESSMENT
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 Endocrinology consulted for outpatient medication recommendations in the setting of the patient's complex social history.    #Diabetes Mellitus  - The patient has a history of insulin dependent type II DM  - Outpatient, the patient was on 17 units basal insulin with 10 units of pre-meal insulin  - The patient has recently lost vision in both of her eyes (R eye 2022, L eye 2023) and is undomiciled  - Current A1C is 8.5% after one month off of insulin    #Inpatient  - Continue with Lantus 14 units at bedtime and admelog 8 units premeal  - Continue with low insulin corrective scale for meals and at bedtime  - Recommend collection of serum C-peptide to assess current pancreatic function    #Outpatient  - The patient's A1C has improved since her previous hospitalization in 11/22 from 11.5% to 8.5%, however she stopped her basal-bolus regimen one month ago and supplemented with what is presumed to be metformin at an unknown dosage that she received from her friend  - A1C is a measure of 3 months of blood glucose levels and it is difficult to determine if the patient's A1C improvement has been maintained while on her metformin-only regimen  - The patient's overall functional status and undomiciled status make it unlikely that she would be able to administer and store insulin on her own    - Given the previous necessity for a basal-bolus insulin regimen, we would recommend continuing at __ units Lantus at bedtime and __ units admelog premeal if the patient could be provided with care in a nursing home or receive similar assistance    - If the patient is to return to the homeless shelter, she is likely not capable of maintaining a basal-bolus regimen and we would recommend:      - Metformin 500mg BID qd for one week, increased to 1000mg BID qd afterwards      -      NOTE INCOMPLETE/ IN PROGRESS. Please wait for attending attestation for official recommendations.    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 Endocrinology consulted for outpatient medication recommendations in the setting of the patient's complex social history.    #Diabetes Mellitus  - The patient has a history of insulin dependent type II DM  - Outpatient, the patient was on 17 units basal insulin with 10 units of pre-meal insulin  - The patient has recently lost vision in both of her eyes (R eye 2022, L eye 2023) and is undomiciled  - Current A1C is 8.5% after one month off of insulin    #Inpatient  - Continue with Lantus 14 units at bedtime and admelog 8 units premeal  - Continue with low insulin corrective scale for meals and at bedtime  - Recommend collection of serum C-peptide to assess current pancreatic function    #Outpatient  - The patient's A1C has improved since her previous hospitalization in 11/22 from 11.5% to 8.5%, however she stopped her basal-bolus regimen one month ago and supplemented with what is presumed to be metformin at an unknown dosage that she received from her friend  - A1C is a measure of 3 months of blood glucose levels and it is difficult to determine if the patient's A1C improvement has been maintained while on her metformin-only regimen  - The patient's overall functional status and undomiciled status make it unlikely that she would be able to administer and store insulin on her own    - Given the previous necessity for a basal-bolus insulin regimen, we would recommend continuing a regimen with basal insulin of __ units of Lantus at bedtime and Metformin 500mg BID qd for one week, increased to 1000mg BID qd afterwards    - If the patient is unable to administer her insulin and cannot receive assistance, we would recommend the following oral regimen:      - Metformin 500mg BID qd for one week, increased to 1000mg BID qd afterwards      - Farxiga 10mg qd    - The patient can follow-up with the [facility]. Located at [address] [place] [address], [facility] [address], [place] [address]. She will require close monitoring of her diabetes following discharge.    -Pt will need prescription for basal insulin pen (ie. Basaglar, Lantus, Tresiba, Toujeo, Levemir) depending on insurance coverage; please send test scripts to see which is covered.     - Please send prescriptions for diabetes supplies (glucometer, test strips, lancets, alcohol swabs, insulin pen needles).   - Routine outpatient opthalmology & podiatry evaluations recommended.    #HLD  - Patient non-adherent to home dose statin, unwilling to initiate new therapy in-patient  - Goal LDL in diabetes mellitus is <70    #HTN  - Patient not currently on antihypertensive medication  - Goal BP in diabetes mellitus is <130/80  - Will continue to monitor

## 2023-09-26 LAB
ANION GAP SERPL CALC-SCNC: 10 MMOL/L — SIGNIFICANT CHANGE UP (ref 7–14)
APPEARANCE UR: CLEAR — SIGNIFICANT CHANGE UP
BILIRUB UR-MCNC: NEGATIVE — SIGNIFICANT CHANGE UP
BUN SERPL-MCNC: 18 MG/DL — SIGNIFICANT CHANGE UP (ref 7–23)
CALCIUM SERPL-MCNC: 9.4 MG/DL — SIGNIFICANT CHANGE UP (ref 8.4–10.5)
CHLORIDE SERPL-SCNC: 100 MMOL/L — SIGNIFICANT CHANGE UP (ref 98–107)
CO2 SERPL-SCNC: 29 MMOL/L — SIGNIFICANT CHANGE UP (ref 22–31)
COLOR SPEC: YELLOW — SIGNIFICANT CHANGE UP
CREAT SERPL-MCNC: 0.93 MG/DL — SIGNIFICANT CHANGE UP (ref 0.5–1.3)
DIFF PNL FLD: NEGATIVE — SIGNIFICANT CHANGE UP
EGFR: 74 ML/MIN/1.73M2 — SIGNIFICANT CHANGE UP
GLUCOSE BLDC GLUCOMTR-MCNC: 142 MG/DL — HIGH (ref 70–99)
GLUCOSE BLDC GLUCOMTR-MCNC: 166 MG/DL — HIGH (ref 70–99)
GLUCOSE BLDC GLUCOMTR-MCNC: 178 MG/DL — HIGH (ref 70–99)
GLUCOSE BLDC GLUCOMTR-MCNC: 198 MG/DL — HIGH (ref 70–99)
GLUCOSE SERPL-MCNC: 150 MG/DL — HIGH (ref 70–99)
GLUCOSE UR QL: NEGATIVE MG/DL — SIGNIFICANT CHANGE UP
HCT VFR BLD CALC: 35.6 % — SIGNIFICANT CHANGE UP (ref 34.5–45)
HGB BLD-MCNC: 11.5 G/DL — SIGNIFICANT CHANGE UP (ref 11.5–15.5)
KETONES UR-MCNC: NEGATIVE MG/DL — SIGNIFICANT CHANGE UP
LEUKOCYTE ESTERASE UR-ACNC: NEGATIVE — SIGNIFICANT CHANGE UP
MAGNESIUM SERPL-MCNC: 2.2 MG/DL — SIGNIFICANT CHANGE UP (ref 1.6–2.6)
MCHC RBC-ENTMCNC: 27 PG — SIGNIFICANT CHANGE UP (ref 27–34)
MCHC RBC-ENTMCNC: 32.3 GM/DL — SIGNIFICANT CHANGE UP (ref 32–36)
MCV RBC AUTO: 83.6 FL — SIGNIFICANT CHANGE UP (ref 80–100)
NITRITE UR-MCNC: NEGATIVE — SIGNIFICANT CHANGE UP
NRBC # BLD: 0 /100 WBCS — SIGNIFICANT CHANGE UP (ref 0–0)
NRBC # FLD: 0 K/UL — SIGNIFICANT CHANGE UP (ref 0–0)
PH UR: 7 — SIGNIFICANT CHANGE UP (ref 5–8)
PHOSPHATE SERPL-MCNC: 3.5 MG/DL — SIGNIFICANT CHANGE UP (ref 2.5–4.5)
PLATELET # BLD AUTO: 188 K/UL — SIGNIFICANT CHANGE UP (ref 150–400)
POTASSIUM SERPL-MCNC: 4.4 MMOL/L — SIGNIFICANT CHANGE UP (ref 3.5–5.3)
POTASSIUM SERPL-SCNC: 4.4 MMOL/L — SIGNIFICANT CHANGE UP (ref 3.5–5.3)
PROT UR-MCNC: NEGATIVE MG/DL — SIGNIFICANT CHANGE UP
RBC # BLD: 4.26 M/UL — SIGNIFICANT CHANGE UP (ref 3.8–5.2)
RBC # FLD: 13.5 % — SIGNIFICANT CHANGE UP (ref 10.3–14.5)
SODIUM SERPL-SCNC: 139 MMOL/L — SIGNIFICANT CHANGE UP (ref 135–145)
SP GR SPEC: 1.02 — SIGNIFICANT CHANGE UP (ref 1–1.03)
UROBILINOGEN FLD QL: 0.2 MG/DL — SIGNIFICANT CHANGE UP (ref 0.2–1)
WBC # BLD: 5.13 K/UL — SIGNIFICANT CHANGE UP (ref 3.8–10.5)
WBC # FLD AUTO: 5.13 K/UL — SIGNIFICANT CHANGE UP (ref 3.8–10.5)

## 2023-09-26 PROCEDURE — 99232 SBSQ HOSP IP/OBS MODERATE 35: CPT | Mod: GC

## 2023-09-26 PROCEDURE — 99232 SBSQ HOSP IP/OBS MODERATE 35: CPT

## 2023-09-26 RX ORDER — METFORMIN HYDROCHLORIDE 850 MG/1
1 TABLET ORAL
Qty: 30 | Refills: 0
Start: 2023-09-26

## 2023-09-26 RX ORDER — INSULIN LISPRO 100/ML
9 VIAL (ML) SUBCUTANEOUS
Refills: 0 | Status: DISCONTINUED | OUTPATIENT
Start: 2023-09-26 | End: 2023-10-13

## 2023-09-26 RX ORDER — EMPAGLIFLOZIN 10 MG/1
1 TABLET, FILM COATED ORAL
Qty: 30 | Refills: 0
Start: 2023-09-26

## 2023-09-26 RX ADMIN — Medication 1: at 12:29

## 2023-09-26 RX ADMIN — Medication 9 UNIT(S): at 17:31

## 2023-09-26 RX ADMIN — ENOXAPARIN SODIUM 40 MILLIGRAM(S): 100 INJECTION SUBCUTANEOUS at 12:47

## 2023-09-26 RX ADMIN — Medication 8 UNIT(S): at 12:29

## 2023-09-26 RX ADMIN — INSULIN GLARGINE 14 UNIT(S): 100 INJECTION, SOLUTION SUBCUTANEOUS at 22:31

## 2023-09-26 RX ADMIN — Medication 8 UNIT(S): at 09:00

## 2023-09-26 RX ADMIN — Medication 1: at 17:31

## 2023-09-26 NOTE — PROGRESS NOTE ADULT - ASSESSMENT
NOTE INCOMPLETE/ IN PROGRESS. Please wait for attending attestation for official recommendations.    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 Endocrinology consulted for outpatient medication recommendations in the setting of the patient's complex social history.    #Diabetes Mellitus  - The patient has a history of insulin dependent type II DM  - Outpatient, the patient was on 17 units basal insulin with 10 units of pre-meal insulin  - The patient has recently lost vision in both of her eyes (R eye 2022, L eye 2023) and is undomiciled  - Current A1C is 8.5% after three months off of insulin    #Inpatient  - Continue with Lantus 14 units at bedtime and admelog 8 units premeal  - Continue with low insulin corrective scale for meals and at bedtime      #Outpatient  - The patient's A1C has improved since her previous hospitalization in 11/22 from 11.5% to 8.5%, the patient stopped her basal-bolus regimen three months ago and supplemented with what is presumed to be a sulfonylurea at an unknown dosage that she received from her friend  - The patient has transitioned to an intermittent fasting diet since she has entered the homeless shelter and has lost weight, those she is not certain of how much  - The patient's overall functional status and undomiciled status make it unlikely that she would be able to administer and store insulin on her own    - We would recommend the following oral regimen:      - Metformin 500mg qd for one week with meals, increased to 1000mg qd with meals afterwards      - Jardiance 10mg qd or Farxiga 10mg qd    - Please send test script for Jardiance 10mg qd and Farxiga 10mg qd and prescribe the patient the more affordable alternative    - The patient can follow-up with the [facility]. Located at [address] [place] [address], [facility] [address], [place] [address]. She will require close monitoring of her diabetes following discharge.    -Pt will need prescription for basal insulin pen (ie. Basaglar, Lantus, Tresiba, Toujeo, Levemir) depending on insurance coverage; please send test scripts to see which is covered.     - Please send prescriptions for diabetes supplies (glucometer, test strips, lancets, alcohol swabs, insulin pen needles).  - Please send test script for GainSpan 3 glucose monitor for blind-compatible glucose monitoring     - Routine outpatient opthalmology & podiatry evaluations recommended.    #HLD  - Patient non-adherent to home dose statin, unwilling to initiate new therapy in-patient  - Goal LDL in diabetes mellitus is <70    #HTN  - Patient not currently on antihypertensive medication  - Goal BP in diabetes mellitus is <130/80  - Will continue to monitor   NOTE INCOMPLETE/ IN PROGRESS. Please wait for attending attestation for official recommendations.    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 Endocrinology consulted for outpatient medication recommendations in the setting of the patient's complex social history.    #Diabetes Mellitus  - The patient has a history of insulin dependent type II DM  - Outpatient, the patient was on 17 units basal insulin with 10 units of pre-meal insulin  - The patient has recently lost vision in both of her eyes (R eye 2022, L eye 2023) and is undomiciled  - Current A1C is 8.5% after three months off of insulin    #Inpatient  - Continue with Lantus 14 units at bedtime   - Increase admelog to 9 units premeal  - Continue with low insulin corrective scale for meals and at bedtime      #Outpatient  - The patient's A1C has improved since her previous hospitalization in 11/22 from 11.5% to 8.5%, the patient stopped her basal-bolus regimen three months ago and supplemented with what is presumed to be a sulfonylurea at an unknown dosage that she received from her friend  - The patient has transitioned to an intermittent fasting diet since she has entered the homeless shelter and has lost weight, those she is not certain of how much  - The patient's overall functional status and undomiciled status make it unlikely that she would be able to administer and store insulin on her own    - We would recommend the following oral regimen:      - Metformin ER 500mg BID for one week with meals, increased to 1000mg BID with meals afterwards      - Jardiance 10mg qd OR Farxiga 10mg qd    - Please send test scripts for Jardiance 10mg qd and Farxiga 10mg qd and prescribe the patient the more affordable alternative    - The patient can follow-up with the [facility]. Located at [address] [place] [address]. [phone]. She will require close monitoring of her diabetes following discharge.    - Please send prescriptions for diabetes supplies (glucometer, test strips, lancets, alcohol swabs, insulin pen needles).  - Please send test script for Anthony 3 glucose monitor for blind-compatible glucose monitoring     - Routine outpatient opthalmology & podiatry evaluations recommended.    #HLD  - Patient non-adherent to home dose statin, unwilling to initiate new therapy in-patient  - Goal LDL in diabetes mellitus is <70    #HTN  - Patient not currently on antihypertensive medication  - Goal BP in diabetes mellitus is <130/80  - Will continue to monitor   NOTE INCOMPLETE/ IN PROGRESS. Please wait for attending attestation for official recommendations.    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 Endocrinology consulted for outpatient medication recommendations in the setting of the patient's complex social history.    #Diabetes Mellitus  - The patient has a history of insulin dependent type II DM  - Outpatient, the patient was on 17 units basal insulin with 10 units of pre-meal insulin  - The patient has recently lost vision in both of her eyes (R eye 2022, L eye 2023) and is undomiciled  - Current A1C is 8.5% after three months off of insulin    #Inpatient  - Continue with Lantus 14 units at bedtime   - Increase admelog to 9 units premeal  - Continue with low insulin corrective scale for meals and at bedtime      #Outpatient  - The patient's A1C has improved since her previous hospitalization in 11/22 from 11.5% to 8.5%, the patient stopped her basal-bolus regimen three months ago and supplemented with what is presumed to be a sulfonylurea at an unknown dosage that she received from her friend  - The patient has transitioned to an intermittent fasting diet since she has entered the homeless shelter and has lost weight, those she is not certain of how much  - The patient's overall functional status and undomiciled status make it unlikely that she would be able to administer and store insulin on her own    - We would recommend the following oral regimen:      - Metformin ER 500mg BID for one week with meals, increased to Metformin ER 500mg x 2 (1000mg) BID with meals afterwards      - Jardiance 10mg qd OR Farxiga 10mg qd    - Please send test scripts for Jardiance 10mg qd and Farxiga 10mg qd and prescribe the patient the more affordable alternative    - The patient can follow-up with the [facility]. Located at [address] [place] [address], [facility] [address], [place] [address]. [phone]. She will require close monitoring of her diabetes following discharge.    - Please send prescriptions for diabetes supplies (glucometer, test strips, lancets, alcohol swabs, insulin pen needles).  - Please send test script for Anthony 3 glucose monitor for blind-compatible glucose monitoring     - Routine outpatient opthalmology & podiatry evaluations recommended.    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 Endocrinology consulted for outpatient medication recommendations in the setting of the patient's complex social history.    #Diabetes Mellitus type 2  On insulin in the past, not on insulin past 3 months, using friend's medication (sulfonylurea), homeless, eats one meal per day  - Outpatient, the patient was on 17 units basal insulin with 10 units of pre-meal insulin  - The patient has recently lost vision in both of her eyes (R eye 2022, L eye 2023) and is undomiciled  - Current A1C is 8.5% after three months off of insulin    #Inpatient  - Continue with Lantus 14 units at bedtime   - Increase admelog to 9 units premeal  - Continue with low insulin corrective scale for meals and at bedtime      #Outpatient  - The patient's A1C has improved since her previous hospitalization in 11/22 from 11.5% to 8.5%, the patient stopped her basal-bolus regimen three months ago and supplemented with what is presumed to be a sulfonylurea at an unknown dosage that she received from her friend  - The patient has transitioned to an intermittent fasting diet since she has entered the homeless shelter and has lost weight, those she is not certain of how much  - The patient's overall functional status and undomiciled status make it unlikely that she would be able to administer and store insulin on her own    - We would recommend the following oral regimen:      - Metformin ER 500mg BID for one week with meals, increased to Metformin ER 500mg x 2 (1000mg) BID with meals afterwards      - Jardiance 10mg qd OR Farxiga 10mg qd    - Please send test scripts for Jardiance 10mg qd and Farxiga 10mg qd and prescribe the patient the more affordable alternative    - The patient can follow-up with the [facility]. Located at [address] [place] [address]. [phone]. She will require close monitoring of her diabetes following discharge.    - Please send prescriptions for diabetes supplies (glucometer, test strips, lancets, alcohol swabs, insulin pen needles).  - Please send test script for Anthony 3 glucose monitor for blind-compatible glucose monitoring     - Routine outpatient opthalmology & podiatry evaluations recommended.    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 Endocrinology consulted for outpatient medication recommendations in the setting of the patient's complex social history.    #Diabetes Mellitus type 2  On insulin in the past, not on insulin past 3 months, using friend's medication (sulfonylurea), homeless, eats one meal per day  - Outpatient, the patient was on 17 units basal insulin with 10 units of pre-meal insulin  - The patient has recently lost vision in both of her eyes (R eye 2022, L eye 2023) and is undomiciled  - Current A1C is 8.5% after three months off of insulin    #Inpatient  - Continue with Lantus 14 units at bedtime   - Increase admelog to 9 units premeal  - Continue with low insulin corrective scale for meals and at bedtime      #Outpatient  - The patient's A1C has improved since her previous hospitalization in 11/22 from 11.5% to 8.5%, the patient stopped her basal-bolus regimen three months ago and supplemented with what is presumed to be a sulfonylurea at an unknown dosage that she received from her friend  - The patient has transitioned to an intermittent fasting diet since she has entered the homeless shelter and has lost weight, those she is not certain of how much  - The patient's overall functional status and undomiciled status make it unlikely that she would be able to administer and store insulin on her own    - We would recommend the following oral regimen:      - Metformin ER 500mg x 2 tabs daily for one week with meal, increase to Metformin ER 500mg x 4 tabs daily with meal (usually eats one meal per day).      - Jardiance 10mg daily OR Farxiga 10mg daily    - Please send test scripts for Jardiance 10mg daily and Farxiga 10mg daily and prescribe the patient the more affordable alternative    - The patient can follow-up with the [facility]. Located at [address] [place] [id number]. [phone]. She will require close monitoring of her diabetes following discharge.    - Please send prescriptions for diabetes supplies (glucometer, test strips, lancets, alcohol swabs, insulin pen needles).  - Please send test script for Anthony 3 glucose monitor for blind-compatible glucose monitoring     - Routine outpatient opthalmology & podiatry evaluations recommended.    #HLD  - Patient non-adherent to home dose statin, unwilling to initiate new therapy in-patient  - Goal LDL in diabetes mellitus is <70    #HTN  - Patient not currently on antihypertensive medication  - Goal BP in diabetes mellitus is <130/80  - Will continue to monitor

## 2023-09-26 NOTE — PROGRESS NOTE ADULT - SUBJECTIVE AND OBJECTIVE BOX
[facility] Department of Hospital Medicine  Dr. Jocelyne Valdez  Pager: [phone]    Patient is a 52y old  Female who presents with a chief complaint of L eye vision loss (23 Sep 2023 14:20)    Subjective:  Pt seen and examined. Agreeable to oral regimen recommended by endocrine. Discussed DC planning to [facility] vs shelter. Patient is thinking about [facility].     Vital Signs Last 24 Hrs  T(C): 37.1 (26 Sep 2023 09:26), Max: 37.1 (26 Sep 2023 09:26)  T(F): 98.8 (26 Sep 2023 09:26), Max: 98.8 (26 Sep 2023 09:26)  HR: 83 (26 Sep 2023 09:26) (72 - 93)  BP: 123/74 (26 Sep 2023 09:26) (123/74 - 150/90)  BP(mean): --  RR: 18 (26 Sep 2023 09:26) (18 - 18)  SpO2: 98% (26 Sep 2023 09:26) (98% - 100%)    Parameters below as of 26 Sep 2023 09:26  Patient On (Oxygen Delivery Method): room air        PHYSICAL EXAM:  Constitutional: resting comfortably in bed; NAD  Head: NC/AT  Eyes: PERRL, anicteric sclera; R eye minimal vision; L eye can see shadows/light  ENT: no nasal discharge; MMM  Neck: supple; no JVD  Respiratory: CTA B/L; no W/R/R  Cardiac: +S1/S2; RRR; no M/R/G  Gastrointestinal: soft, NT/ND; no rebound or guarding; +BSx4  Extremities: WWP, no clubbing or cyanosis; no peripheral edema  Musculoskeletal: NROM x4; no joint swelling, tenderness or erythema  Vascular: 2+ radial, DP/PT pulses B/L  Dermatologic: skin warm, dry and intact; no rashes, wounds, or scars  Neurologic: AAOx3; CNII-XII grossly intact; no focal deficits  Psychiatric: affect and characteristics of appearance, verbalizations, behaviors are appropriate    MEDICATIONS  (STANDING):  dextrose 5%. 1000 milliLiter(s) (100 mL/Hr) IV Continuous <Continuous>  dextrose 5%. 1000 milliLiter(s) (50 mL/Hr) IV Continuous <Continuous>  dextrose 50% Injectable 25 Gram(s) IV Push once  dextrose 50% Injectable 25 Gram(s) IV Push once  dextrose 50% Injectable 12.5 Gram(s) IV Push once  enoxaparin Injectable 40 milliGRAM(s) SubCutaneous every 24 hours  glucagon  Injectable 1 milliGRAM(s) IntraMuscular once  influenza   Vaccine 0.5 milliLiter(s) IntraMuscular once  insulin glargine Injectable (LANTUS) 14 Unit(s) SubCutaneous at bedtime  insulin lispro (ADMELOG) corrective regimen sliding scale   SubCutaneous three times a day before meals  insulin lispro (ADMELOG) corrective regimen sliding scale   SubCutaneous at bedtime  insulin lispro Injectable (ADMELOG) 8 Unit(s) SubCutaneous three times a day before meals    MEDICATIONS  (PRN):  acetaminophen     Tablet .. 650 milliGRAM(s) Oral every 6 hours PRN Temp greater or equal to 38C (100.4F), Mild Pain (1 - 3)  dextrose Oral Gel 15 Gram(s) Oral once PRN Blood Glucose LESS THAN 70 milliGRAM(s)/deciliter  melatonin 3 milliGRAM(s) Oral at bedtime PRN Insomnia      LABS:                                   11.5   5.13  )-----------( 188      ( 26 Sep 2023 07:40 )             35.6   09-26    139  |  100  |  18  ----------------------------<  150<H>  4.4   |  29  |  0.93    Ca    9.4      26 Sep 2023 07:40  Phos  3.5     09-26  Mg     2.20     09-26      RADIOLOGY & ADDITIONAL TESTS: Reviewed.

## 2023-09-26 NOTE — PROGRESS NOTE ADULT - SUBJECTIVE AND OBJECTIVE BOX
NOTE INCOMPLETE/ IN PROGRESS. Please wait for attending attestation for official recommendations.    Chief Complaint: L eye vision loss    History: Patient has tolerated insulin regimen well. Pending dispo placement per .    MEDICATIONS  (STANDING):  dextrose 5%. 1000 milliLiter(s) (50 mL/Hr) IV Continuous <Continuous>  dextrose 5%. 1000 milliLiter(s) (100 mL/Hr) IV Continuous <Continuous>  dextrose 50% Injectable 12.5 Gram(s) IV Push once  dextrose 50% Injectable 25 Gram(s) IV Push once  dextrose 50% Injectable 25 Gram(s) IV Push once  enoxaparin Injectable 40 milliGRAM(s) SubCutaneous every 24 hours  glucagon  Injectable 1 milliGRAM(s) IntraMuscular once  influenza   Vaccine 0.5 milliLiter(s) IntraMuscular once  insulin glargine Injectable (LANTUS) 14 Unit(s) SubCutaneous at bedtime  insulin lispro (ADMELOG) corrective regimen sliding scale   SubCutaneous three times a day before meals  insulin lispro (ADMELOG) corrective regimen sliding scale   SubCutaneous at bedtime  insulin lispro Injectable (ADMELOG) 8 Unit(s) SubCutaneous three times a day before meals    MEDICATIONS  (PRN):  acetaminophen     Tablet .. 650 milliGRAM(s) Oral every 6 hours PRN Temp greater or equal to 38C (100.4F), Mild Pain (1 - 3)  dextrose Oral Gel 15 Gram(s) Oral once PRN Blood Glucose LESS THAN 70 milliGRAM(s)/deciliter  melatonin 3 milliGRAM(s) Oral at bedtime PRN Insomnia      Allergies    No Known Allergies    Intolerances      Review of Systems:  Constitutional: No fever  Eyes: No blurry vision  Neuro: No tremors  HEENT: No pain  Cardiovascular: No chest pain, palpitations  Respiratory: No SOB, no cough  GI: No nausea, vomiting, abdominal pain  : No dysuria  Skin: no rash  Psych: no depression  Endocrine: no polyuria, polydipsia  Hem/lymph: no swelling  Osteoporosis: no fractures    ALL OTHER SYSTEMS REVIEWED AND NEGATIVE    UNABLE TO OBTAIN    PHYSICAL EXAM:  VITALS: T(C): 37.1 (09-26-23 @ 09:26)  T(F): 98.8 (09-26-23 @ 09:26), Max: 98.8 (09-26-23 @ 09:26)  HR: 83 (09-26-23 @ 09:26) (72 - 93)  BP: 123/74 (09-26-23 @ 09:26) (123/74 - 150/90)  RR:  (18 - 18)  SpO2:  (98% - 100%)  Wt(kg): --  GENERAL: NAD, well-groomed, well-developed  EYES: No proptosis, no lid lag, anicteric  HEENT:  Atraumatic, Normocephalic, moist mucous membranes  THYROID: Normal size, no palpable nodules  RESPIRATORY: Clear to auscultation bilaterally; No rales, rhonchi, wheezing  CARDIOVASCULAR: Regular rate and rhythm; No murmurs; no peripheral edema  GI: Soft, nontender, non distended  SKIN: Dry, intact, No rashes or lesions  MUSCULOSKELETAL: Full range of motion, normal strength  NEURO: extraocular movements intact, no tremor  PSYCH: Alert and oriented x 3, normal affect, normal mood    CAPILLARY BLOOD GLUCOSE      POCT Blood Glucose.: 142 mg/dL (26 Sep 2023 08:24)  POCT Blood Glucose.: 210 mg/dL (25 Sep 2023 21:42)  POCT Blood Glucose.: 115 mg/dL (25 Sep 2023 17:09)  POCT Blood Glucose.: 181 mg/dL (25 Sep 2023 12:03)      09-26    139  |  100  |  18  ----------------------------<  150<H>  4.4   |  29  |  0.93    eGFR: 74    Ca    9.4      09-26  Mg     2.20     09-26  Phos  3.5     09-26        A1C with Estimated Average Glucose Result: 8.5 % (09-22-23 @ 21:05)  A1C with Estimated Average Glucose Result: 11.5 % (11-22-22 @ 10:40)      Thyroid Function Tests:  09-23 @ 09:21 TSH 0.79 FreeT4 -- T3 -- Anti TPO -- Anti Thyroglobulin Ab -- TSI --                       Chief Complaint: L eye vision loss    History: Patient has tolerated insulin regimen well. Pending dispo placement per .    MEDICATIONS  (STANDING):  dextrose 5%. 1000 milliLiter(s) (50 mL/Hr) IV Continuous <Continuous>  dextrose 5%. 1000 milliLiter(s) (100 mL/Hr) IV Continuous <Continuous>  dextrose 50% Injectable 12.5 Gram(s) IV Push once  dextrose 50% Injectable 25 Gram(s) IV Push once  dextrose 50% Injectable 25 Gram(s) IV Push once  enoxaparin Injectable 40 milliGRAM(s) SubCutaneous every 24 hours  glucagon  Injectable 1 milliGRAM(s) IntraMuscular once  influenza   Vaccine 0.5 milliLiter(s) IntraMuscular once  insulin glargine Injectable (LANTUS) 14 Unit(s) SubCutaneous at bedtime  insulin lispro (ADMELOG) corrective regimen sliding scale   SubCutaneous three times a day before meals  insulin lispro (ADMELOG) corrective regimen sliding scale   SubCutaneous at bedtime  insulin lispro Injectable (ADMELOG) 8 Unit(s) SubCutaneous three times a day before meals    MEDICATIONS  (PRN):  acetaminophen     Tablet .. 650 milliGRAM(s) Oral every 6 hours PRN Temp greater or equal to 38C (100.4F), Mild Pain (1 - 3)  dextrose Oral Gel 15 Gram(s) Oral once PRN Blood Glucose LESS THAN 70 milliGRAM(s)/deciliter  melatonin 3 milliGRAM(s) Oral at bedtime PRN Insomnia      Allergies    No Known Allergies    Intolerances      Review of Systems:  Constitutional: No fever  Eyes: No blurry vision  Neuro: No tremors  HEENT: No pain  Cardiovascular: No chest pain, palpitations  Respiratory: No SOB, no cough  GI: No nausea, vomiting, abdominal pain  : No dysuria  Skin: no rash  Psych: no depression  Endocrine: no polyuria, polydipsia  Hem/lymph: no swelling  Osteoporosis: no fractures    ALL OTHER SYSTEMS REVIEWED AND NEGATIVE      PHYSICAL EXAM:  VITALS: T(C): 37.1 (09-26-23 @ 09:26)  T(F): 98.8 (09-26-23 @ 09:26), Max: 98.8 (09-26-23 @ 09:26)  HR: 83 (09-26-23 @ 09:26) (72 - 93)  BP: 123/74 (09-26-23 @ 09:26) (123/74 - 150/90)  RR:  (18 - 18)  SpO2:  (98% - 100%)  Wt(kg): --  GENERAL: NAD, well-groomed, well-developed  EYES: No proptosis, no lid lag, anicteric  HEENT:  Atraumatic, Normocephalic, moist mucous membranes  THYROID: Normal size, no palpable nodules  RESPIRATORY: Clear to auscultation bilaterally; No rales, rhonchi, wheezing  CARDIOVASCULAR: Regular rate and rhythm; No murmurs; no peripheral edema  GI: Soft, nontender, non distended  SKIN: Dry, intact, No rashes or lesions  MUSCULOSKELETAL: Full range of motion, normal strength  NEURO: extraocular movements intact, no tremor  PSYCH: Alert and oriented x 3, normal affect, normal mood    CAPILLARY BLOOD GLUCOSE      POCT Blood Glucose.: 142 mg/dL (26 Sep 2023 08:24)  POCT Blood Glucose.: 210 mg/dL (25 Sep 2023 21:42)  POCT Blood Glucose.: 115 mg/dL (25 Sep 2023 17:09)  POCT Blood Glucose.: 181 mg/dL (25 Sep 2023 12:03)      09-26    139  |  100  |  18  ----------------------------<  150<H>  4.4   |  29  |  0.93    eGFR: 74    Ca    9.4      09-26  Mg     2.20     09-26  Phos  3.5     09-26        A1C with Estimated Average Glucose Result: 8.5 % (09-22-23 @ 21:05)  A1C with Estimated Average Glucose Result: 11.5 % (11-22-22 @ 10:40)      Thyroid Function Tests:  09-23 @ 09:21 TSH 0.79 FreeT4 -- T3 -- Anti TPO -- Anti Thyroglobulin Ab -- TSI --

## 2023-09-27 LAB
ANION GAP SERPL CALC-SCNC: 7 MMOL/L — SIGNIFICANT CHANGE UP (ref 7–14)
B PERT DNA SPEC QL NAA+PROBE: SIGNIFICANT CHANGE UP
B PERT+PARAPERT DNA PNL SPEC NAA+PROBE: SIGNIFICANT CHANGE UP
BORDETELLA PARAPERTUSSIS (RAPRVP): SIGNIFICANT CHANGE UP
BUN SERPL-MCNC: 20 MG/DL — SIGNIFICANT CHANGE UP (ref 7–23)
C PNEUM DNA SPEC QL NAA+PROBE: SIGNIFICANT CHANGE UP
CALCIUM SERPL-MCNC: 9.1 MG/DL — SIGNIFICANT CHANGE UP (ref 8.4–10.5)
CHLORIDE SERPL-SCNC: 102 MMOL/L — SIGNIFICANT CHANGE UP (ref 98–107)
CO2 SERPL-SCNC: 27 MMOL/L — SIGNIFICANT CHANGE UP (ref 22–31)
CREAT SERPL-MCNC: 0.88 MG/DL — SIGNIFICANT CHANGE UP (ref 0.5–1.3)
EGFR: 79 ML/MIN/1.73M2 — SIGNIFICANT CHANGE UP
FLUAV SUBTYP SPEC NAA+PROBE: SIGNIFICANT CHANGE UP
FLUBV RNA SPEC QL NAA+PROBE: SIGNIFICANT CHANGE UP
GLUCOSE BLDC GLUCOMTR-MCNC: 139 MG/DL — HIGH (ref 70–99)
GLUCOSE BLDC GLUCOMTR-MCNC: 147 MG/DL — HIGH (ref 70–99)
GLUCOSE BLDC GLUCOMTR-MCNC: 172 MG/DL — HIGH (ref 70–99)
GLUCOSE BLDC GLUCOMTR-MCNC: 255 MG/DL — HIGH (ref 70–99)
GLUCOSE SERPL-MCNC: 199 MG/DL — HIGH (ref 70–99)
HADV DNA SPEC QL NAA+PROBE: SIGNIFICANT CHANGE UP
HCOV 229E RNA SPEC QL NAA+PROBE: SIGNIFICANT CHANGE UP
HCOV HKU1 RNA SPEC QL NAA+PROBE: SIGNIFICANT CHANGE UP
HCOV NL63 RNA SPEC QL NAA+PROBE: SIGNIFICANT CHANGE UP
HCOV OC43 RNA SPEC QL NAA+PROBE: SIGNIFICANT CHANGE UP
HCT VFR BLD CALC: 34.7 % — SIGNIFICANT CHANGE UP (ref 34.5–45)
HGB BLD-MCNC: 11.3 G/DL — LOW (ref 11.5–15.5)
HMPV RNA SPEC QL NAA+PROBE: SIGNIFICANT CHANGE UP
HPIV1 RNA SPEC QL NAA+PROBE: SIGNIFICANT CHANGE UP
HPIV2 RNA SPEC QL NAA+PROBE: SIGNIFICANT CHANGE UP
HPIV3 RNA SPEC QL NAA+PROBE: SIGNIFICANT CHANGE UP
HPIV4 RNA SPEC QL NAA+PROBE: SIGNIFICANT CHANGE UP
M PNEUMO DNA SPEC QL NAA+PROBE: SIGNIFICANT CHANGE UP
MAGNESIUM SERPL-MCNC: 2.1 MG/DL — SIGNIFICANT CHANGE UP (ref 1.6–2.6)
MCHC RBC-ENTMCNC: 27.2 PG — SIGNIFICANT CHANGE UP (ref 27–34)
MCHC RBC-ENTMCNC: 32.6 GM/DL — SIGNIFICANT CHANGE UP (ref 32–36)
MCV RBC AUTO: 83.4 FL — SIGNIFICANT CHANGE UP (ref 80–100)
NRBC # BLD: 0 /100 WBCS — SIGNIFICANT CHANGE UP (ref 0–0)
NRBC # FLD: 0 K/UL — SIGNIFICANT CHANGE UP (ref 0–0)
PHOSPHATE SERPL-MCNC: 2.9 MG/DL — SIGNIFICANT CHANGE UP (ref 2.5–4.5)
PLATELET # BLD AUTO: 184 K/UL — SIGNIFICANT CHANGE UP (ref 150–400)
POTASSIUM SERPL-MCNC: 4.2 MMOL/L — SIGNIFICANT CHANGE UP (ref 3.5–5.3)
POTASSIUM SERPL-SCNC: 4.2 MMOL/L — SIGNIFICANT CHANGE UP (ref 3.5–5.3)
RAPID RVP RESULT: SIGNIFICANT CHANGE UP
RBC # BLD: 4.16 M/UL — SIGNIFICANT CHANGE UP (ref 3.8–5.2)
RBC # FLD: 13.3 % — SIGNIFICANT CHANGE UP (ref 10.3–14.5)
RSV RNA SPEC QL NAA+PROBE: SIGNIFICANT CHANGE UP
RV+EV RNA SPEC QL NAA+PROBE: SIGNIFICANT CHANGE UP
SARS-COV-2 RNA SPEC QL NAA+PROBE: SIGNIFICANT CHANGE UP
SODIUM SERPL-SCNC: 136 MMOL/L — SIGNIFICANT CHANGE UP (ref 135–145)
WBC # BLD: 5.12 K/UL — SIGNIFICANT CHANGE UP (ref 3.8–10.5)
WBC # FLD AUTO: 5.12 K/UL — SIGNIFICANT CHANGE UP (ref 3.8–10.5)

## 2023-09-27 PROCEDURE — 99232 SBSQ HOSP IP/OBS MODERATE 35: CPT

## 2023-09-27 RX ORDER — EMPAGLIFLOZIN 10 MG/1
1 TABLET, FILM COATED ORAL
Qty: 30 | Refills: 1
Start: 2023-09-27

## 2023-09-27 RX ORDER — METFORMIN HYDROCHLORIDE 850 MG/1
1 TABLET ORAL
Qty: 30 | Refills: 1
Start: 2023-09-27

## 2023-09-27 RX ADMIN — Medication 1: at 09:00

## 2023-09-27 RX ADMIN — Medication 9 UNIT(S): at 17:50

## 2023-09-27 RX ADMIN — ENOXAPARIN SODIUM 40 MILLIGRAM(S): 100 INJECTION SUBCUTANEOUS at 17:50

## 2023-09-27 RX ADMIN — INSULIN GLARGINE 14 UNIT(S): 100 INJECTION, SOLUTION SUBCUTANEOUS at 22:34

## 2023-09-27 RX ADMIN — Medication 3: at 17:49

## 2023-09-27 RX ADMIN — Medication 9 UNIT(S): at 09:00

## 2023-09-27 RX ADMIN — Medication 9 UNIT(S): at 12:53

## 2023-09-27 NOTE — CHART NOTE - NSCHARTNOTEFT_GEN_A_CORE
RN notified that patient swollen lymph node on the neck. patient evaluated at bedside. Patient states she noted the swelling of the lymph node this morning. She states its painful as well. Denies sore throat, runny nose or other URI symptoms.   General: no distress  Neck: L side of the neck with small palpable lymph which is tender to palpation. RN notified that patient swollen lymph node on the neck. patient evaluated at bedside. Patient states she noted the swelling of the lymph node this morning. She states its painful as well. Denies sore throat, runny nose or other URI symptoms.   General: no distress  Neck: R side of the neck with small palpable lymph which is tender to palpation.  Swollen lymph node     -RVP ordered     -monitor lymph node closely

## 2023-09-27 NOTE — PROGRESS NOTE ADULT - SUBJECTIVE AND OBJECTIVE BOX
[facility] Department of Hospital Medicine  Dr. Jocelyne Valdez  Pager: [phone]    Patient is a 52y old  Female who presents with a chief complaint of L eye vision loss (23 Sep 2023 14:20)    Subjective:  Pt seen and examined. Reported right sided lymph node but denies any sore throat, cough or SOB. Feels ok medically. Upset about her discharge situation. Does not want to fo to SNF. She feels that she is not being offered resources that she needs and deserves. Feels other patients get  resources but for some reason we are not offering them to her. I explained that SW is working on a safe dispo for her.     Vital Signs Last 24 Hrs  T(C): 37.1 (27 Sep 2023 06:29), Max: 37.1 (27 Sep 2023 06:29)  T(F): 98.7 (27 Sep 2023 06:29), Max: 98.7 (27 Sep 2023 06:29)  HR: 95 (27 Sep 2023 06:29) (95 - 95)  BP: 144/80 (27 Sep 2023 06:29) (144/80 - 161/95)  BP(mean): --  RR: 18 (27 Sep 2023 06:29) (18 - 18)  SpO2: 98% (27 Sep 2023 06:29) (98% - 99%)    Parameters below as of 27 Sep 2023 06:29  Patient On (Oxygen Delivery Method): room air      PHYSICAL EXAM:  Constitutional: resting comfortably in bed; NAD  Head: NC/AT  Eyes: PERRL, anicteric sclera; R eye minimal vision; L eye can see shadows/light  ENT: no nasal discharge; MMM  Neck: supple; no JVD  Respiratory: CTA B/L; no W/R/R  Cardiac: +S1/S2; RRR; no M/R/G  Gastrointestinal: soft, NT/ND; no rebound or guarding; +BSx4  Extremities: WWP, no clubbing or cyanosis; no peripheral edema  Musculoskeletal: NROM x4; no joint swelling, tenderness or erythema  Vascular: 2+ radial, DP/PT pulses B/L  Dermatologic: skin warm, dry and intact; no rashes, wounds, or scars  Neurologic: AAOx3; CNII-XII grossly intact; no focal deficits  Psychiatric: affect and characteristics of appearance, verbalizations, behaviors are appropriate    MEDICATIONS  (STANDING):  dextrose 5%. 1000 milliLiter(s) (100 mL/Hr) IV Continuous <Continuous>  dextrose 5%. 1000 milliLiter(s) (50 mL/Hr) IV Continuous <Continuous>  dextrose 50% Injectable 25 Gram(s) IV Push once  dextrose 50% Injectable 25 Gram(s) IV Push once  dextrose 50% Injectable 12.5 Gram(s) IV Push once  enoxaparin Injectable 40 milliGRAM(s) SubCutaneous every 24 hours  glucagon  Injectable 1 milliGRAM(s) IntraMuscular once  influenza   Vaccine 0.5 milliLiter(s) IntraMuscular once  insulin glargine Injectable (LANTUS) 14 Unit(s) SubCutaneous at bedtime  insulin lispro (ADMELOG) corrective regimen sliding scale   SubCutaneous three times a day before meals  insulin lispro (ADMELOG) corrective regimen sliding scale   SubCutaneous at bedtime  insulin lispro Injectable (ADMELOG) 8 Unit(s) SubCutaneous three times a day before meals    MEDICATIONS  (PRN):  acetaminophen     Tablet .. 650 milliGRAM(s) Oral every 6 hours PRN Temp greater or equal to 38C (100.4F), Mild Pain (1 - 3)  dextrose Oral Gel 15 Gram(s) Oral once PRN Blood Glucose LESS THAN 70 milliGRAM(s)/deciliter  melatonin 3 milliGRAM(s) Oral at bedtime PRN Insomnia      LABS:                                   11.3   5.12  )-----------( 184      ( 27 Sep 2023 06:26 )             34.7   09-27    136  |  102  |  20  ----------------------------<  199<H>  4.2   |  27  |  0.88    Ca    9.1      27 Sep 2023 06:26  Phos  2.9     09-27  Mg     2.10     09-27                 RADIOLOGY & ADDITIONAL TESTS: Reviewed.

## 2023-09-28 LAB
ANION GAP SERPL CALC-SCNC: 13 MMOL/L — SIGNIFICANT CHANGE UP (ref 7–14)
BASOPHILS # BLD AUTO: 0.04 K/UL — SIGNIFICANT CHANGE UP (ref 0–0.2)
BASOPHILS NFR BLD AUTO: 0.8 % — SIGNIFICANT CHANGE UP (ref 0–2)
BUN SERPL-MCNC: 21 MG/DL — SIGNIFICANT CHANGE UP (ref 7–23)
CALCIUM SERPL-MCNC: 9.3 MG/DL — SIGNIFICANT CHANGE UP (ref 8.4–10.5)
CHLORIDE SERPL-SCNC: 98 MMOL/L — SIGNIFICANT CHANGE UP (ref 98–107)
CO2 SERPL-SCNC: 28 MMOL/L — SIGNIFICANT CHANGE UP (ref 22–31)
CREAT SERPL-MCNC: 1.02 MG/DL — SIGNIFICANT CHANGE UP (ref 0.5–1.3)
EGFR: 66 ML/MIN/1.73M2 — SIGNIFICANT CHANGE UP
EOSINOPHIL # BLD AUTO: 0.11 K/UL — SIGNIFICANT CHANGE UP (ref 0–0.5)
EOSINOPHIL NFR BLD AUTO: 2.1 % — SIGNIFICANT CHANGE UP (ref 0–6)
GLUCOSE BLDC GLUCOMTR-MCNC: 142 MG/DL — HIGH (ref 70–99)
GLUCOSE BLDC GLUCOMTR-MCNC: 174 MG/DL — HIGH (ref 70–99)
GLUCOSE BLDC GLUCOMTR-MCNC: 187 MG/DL — HIGH (ref 70–99)
GLUCOSE BLDC GLUCOMTR-MCNC: 253 MG/DL — HIGH (ref 70–99)
GLUCOSE BLDC GLUCOMTR-MCNC: 287 MG/DL — HIGH (ref 70–99)
GLUCOSE SERPL-MCNC: 278 MG/DL — HIGH (ref 70–99)
HCT VFR BLD CALC: 37.2 % — SIGNIFICANT CHANGE UP (ref 34.5–45)
HGB BLD-MCNC: 11.7 G/DL — SIGNIFICANT CHANGE UP (ref 11.5–15.5)
IANC: 3.17 K/UL — SIGNIFICANT CHANGE UP (ref 1.8–7.4)
IMM GRANULOCYTES NFR BLD AUTO: 0.4 % — SIGNIFICANT CHANGE UP (ref 0–0.9)
LYMPHOCYTES # BLD AUTO: 1.55 K/UL — SIGNIFICANT CHANGE UP (ref 1–3.3)
LYMPHOCYTES # BLD AUTO: 29.8 % — SIGNIFICANT CHANGE UP (ref 13–44)
MAGNESIUM SERPL-MCNC: 2.1 MG/DL — SIGNIFICANT CHANGE UP (ref 1.6–2.6)
MCHC RBC-ENTMCNC: 26.7 PG — LOW (ref 27–34)
MCHC RBC-ENTMCNC: 31.5 GM/DL — LOW (ref 32–36)
MCV RBC AUTO: 84.9 FL — SIGNIFICANT CHANGE UP (ref 80–100)
MONOCYTES # BLD AUTO: 0.32 K/UL — SIGNIFICANT CHANGE UP (ref 0–0.9)
MONOCYTES NFR BLD AUTO: 6.1 % — SIGNIFICANT CHANGE UP (ref 2–14)
NEUTROPHILS # BLD AUTO: 3.17 K/UL — SIGNIFICANT CHANGE UP (ref 1.8–7.4)
NEUTROPHILS NFR BLD AUTO: 60.8 % — SIGNIFICANT CHANGE UP (ref 43–77)
NRBC # BLD: 0 /100 WBCS — SIGNIFICANT CHANGE UP (ref 0–0)
NRBC # FLD: 0 K/UL — SIGNIFICANT CHANGE UP (ref 0–0)
PHOSPHATE SERPL-MCNC: 2.8 MG/DL — SIGNIFICANT CHANGE UP (ref 2.5–4.5)
PLATELET # BLD AUTO: 189 K/UL — SIGNIFICANT CHANGE UP (ref 150–400)
POTASSIUM SERPL-MCNC: 4.4 MMOL/L — SIGNIFICANT CHANGE UP (ref 3.5–5.3)
POTASSIUM SERPL-SCNC: 4.4 MMOL/L — SIGNIFICANT CHANGE UP (ref 3.5–5.3)
RBC # BLD: 4.38 M/UL — SIGNIFICANT CHANGE UP (ref 3.8–5.2)
RBC # FLD: 13.6 % — SIGNIFICANT CHANGE UP (ref 10.3–14.5)
SODIUM SERPL-SCNC: 139 MMOL/L — SIGNIFICANT CHANGE UP (ref 135–145)
WBC # BLD: 5.21 K/UL — SIGNIFICANT CHANGE UP (ref 3.8–10.5)
WBC # FLD AUTO: 5.21 K/UL — SIGNIFICANT CHANGE UP (ref 3.8–10.5)

## 2023-09-28 PROCEDURE — 99232 SBSQ HOSP IP/OBS MODERATE 35: CPT | Mod: GC

## 2023-09-28 PROCEDURE — 99232 SBSQ HOSP IP/OBS MODERATE 35: CPT

## 2023-09-28 RX ADMIN — Medication 9 UNIT(S): at 17:04

## 2023-09-28 RX ADMIN — Medication 3: at 11:47

## 2023-09-28 RX ADMIN — Medication 1: at 17:03

## 2023-09-28 RX ADMIN — INSULIN GLARGINE 14 UNIT(S): 100 INJECTION, SOLUTION SUBCUTANEOUS at 22:46

## 2023-09-28 RX ADMIN — Medication 9 UNIT(S): at 11:50

## 2023-09-28 NOTE — PROVIDER CONTACT NOTE (MEDICATION) - RECOMMENDATIONS
As per provider Roberto Erickson (TEAMS), educate the patient and monica as not done if patient is still refusing. RN will continue to monitor.

## 2023-09-28 NOTE — PROGRESS NOTE ADULT - ASSESSMENT
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 Endocrinology consulted for outpatient medication recommendations in the setting of the patient's complex social history.    #Diabetes Mellitus type 2  On insulin in the past, not on insulin past 3 months, using friend's medication (sulfonylurea), homeless, eats one meal per day  - Outpatient, the patient was on 17 units basal insulin with 10 units of pre-meal insulin  - The patient has recently lost vision in both of her eyes (R eye 2022, L eye 2023) and is undomiciled  - Current A1C is 8.5% after three months off of insulin    #Inpatient  - Continue with Lantus 14 units at bedtime   - Continue admelog at 9 units premeal  - Continue with low insulin corrective scale for meals and at bedtime      #Outpatient  - The patient's A1C has improved since her previous hospitalization in 11/22 from 11.5% to 8.5%, the patient stopped her basal-bolus regimen three months ago and supplemented with what is presumed to be a sulfonylurea at an unknown dosage that she received from her friend  - The patient has transitioned to an intermittent fasting diet since she has entered the homeless shelter and has lost weight, those she is not certain of how much  - The patient's overall functional status and undomiciled status make it unlikely that she would be able to administer and store insulin on her own    - We would recommend the following oral regimen:      - Metformin ER 500mg x 2 tabs daily for one week with meal, increase to Metformin ER 500mg x 4 tabs daily with meal (usually eats one meal per day).      - Jardiance 10mg daily OR Farxiga 10mg daily    - Please send test scripts for Jardiance 10mg daily and Farxiga 10mg daily and prescribe the patient the more affordable alternative    - The patient can follow-up with the [facility]. Located at [address] [place] [id number]. [phone]. She will require close monitoring of her diabetes following discharge.    - Please send prescriptions for diabetes supplies (glucometer, test strips, lancets, alcohol swabs, insulin pen needles).  - Please send test script for Anthony 3 glucose monitor for blind-compatible glucose monitoring     - Routine outpatient opthalmology & podiatry evaluations recommended.    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 Endocrinology consulted for outpatient medication recommendations in the setting of the patient's complex social history.    #Diabetes Mellitus type 2  On insulin in the past, not on insulin past 3 months, using friend's medication (sulfonylurea), homeless, eats one meal per day  - Outpatient, the patient was on 17 units basal insulin with 10 units of pre-meal insulin  - The patient has recently lost vision in both of her eyes (R eye 2022, L eye 2023) and is undomiciled  - Current A1C is 8.5% after three months off of insulin    #Inpatient  - Continue with Lantus 14 units at bedtime   - Continue admelog at 9 units premeal  - Continue with low insulin corrective scale for meals and at bedtime      #Outpatient  - The patient's A1C has improved since her previous hospitalization in 11/22 from 11.5% to 8.5%, the patient stopped her basal-bolus regimen three months ago and supplemented with what is presumed to be a sulfonylurea at an unknown dosage that she received from her friend  - The patient has transitioned to an intermittent fasting diet since she has entered the homeless shelter and has lost weight, those she is not certain of how much  - The patient's overall functional status and undomiciled status make it unlikely that she would be able to administer and store insulin on her own    - We would recommend the following oral regimen:      - Metformin ER 500mg x 2 tabs daily for one week with meal, increase to Metformin ER 500mg x 4 tabs daily with meal (usually eats one meal per day).      - Jardiance 10mg daily    - The patient can follow-up with the [facility]. Located at [address] [place] [address], [facility] [address], [place] [address]. [phone]. She will require close monitoring of her diabetes following discharge.    - Please send prescriptions for diabetes supplies (glucometer, test strips, lancets, alcohol swabs, insulin pen needles).  - Patient has been approved for Lendinero 3 glucose monitor, will request diabetes education for the patient to understand use of the monitor i/s/o her recent loss of vision     - Routine outpatient opthalmology & podiatry evaluations recommended.    #HLD  - Patient non-adherent to home dose statin, unwilling to initiate new therapy in-patient  - Goal LDL in diabetes mellitus is <70    #HTN  - Patient not currently on antihypertensive medication  - Goal BP in diabetes mellitus is <130/80  - Will continue to monitor

## 2023-09-28 NOTE — PROGRESS NOTE ADULT - SUBJECTIVE AND OBJECTIVE BOX
NOTE INCOMPLETE/ IN PROGRESS. Please wait for attending attestation for official recommendations.    Chief Complaint: L eye vision loss    History: Patient refused insulin dosage this AM. Patient stated she was concerned that the dosage was too high, after discussion she is amenable to future insulin administration. The patient is to receive outpatient medications and Anthony at bedside. Disposition is unclear for the patient, as she has deferred [facility] placement and hopes to return to her original shelter. SW pending discussion with the shelter to determine if it can accommodate the patient given her newly reduced functional status.    MEDICATIONS  (STANDING):  dextrose 5%. 1000 milliLiter(s) (100 mL/Hr) IV Continuous <Continuous>  dextrose 5%. 1000 milliLiter(s) (50 mL/Hr) IV Continuous <Continuous>  dextrose 50% Injectable 25 Gram(s) IV Push once  dextrose 50% Injectable 12.5 Gram(s) IV Push once  dextrose 50% Injectable 25 Gram(s) IV Push once  enoxaparin Injectable 40 milliGRAM(s) SubCutaneous every 24 hours  glucagon  Injectable 1 milliGRAM(s) IntraMuscular once  influenza   Vaccine 0.5 milliLiter(s) IntraMuscular once  insulin glargine Injectable (LANTUS) 14 Unit(s) SubCutaneous at bedtime  insulin lispro (ADMELOG) corrective regimen sliding scale   SubCutaneous at bedtime  insulin lispro (ADMELOG) corrective regimen sliding scale   SubCutaneous three times a day before meals  insulin lispro Injectable (ADMELOG) 9 Unit(s) SubCutaneous three times a day before meals    MEDICATIONS  (PRN):  acetaminophen     Tablet .. 650 milliGRAM(s) Oral every 6 hours PRN Temp greater or equal to 38C (100.4F), Mild Pain (1 - 3)  dextrose Oral Gel 15 Gram(s) Oral once PRN Blood Glucose LESS THAN 70 milliGRAM(s)/deciliter  melatonin 3 milliGRAM(s) Oral at bedtime PRN Insomnia      Allergies    No Known Allergies    Intolerances      Review of Systems:  Constitutional: No fever  Eyes: No blurry vision  Neuro: No tremors  HEENT: No pain  Cardiovascular: No chest pain, palpitations  Respiratory: No SOB, no cough  GI: No nausea, vomiting, abdominal pain  : No dysuria  Skin: no rash  Psych: no depression  Endocrine: no polyuria, polydipsia  Hem/lymph: no swelling  Osteoporosis: no fractures    ALL OTHER SYSTEMS REVIEWED AND NEGATIVE      PHYSICAL EXAM:  VITALS: T(C): 36.8 (09-28-23 @ 06:37)  T(F): 98.3 (09-28-23 @ 06:37), Max: 98.6 (09-27-23 @ 15:19)  HR: 86 (09-28-23 @ 06:37) (86 - 96)  BP: 154/87 (09-28-23 @ 06:37) (150/93 - 154/87)  RR:  (17 - 17)  SpO2:  (100% - 100%)  Wt(kg): --  GENERAL: NAD, well-groomed, well-developed  EYES: No proptosis, no lid lag, anicteric; blind  HEENT:  Atraumatic, Normocephalic, moist mucous membranes  THYROID: Normal size, no palpable nodules  RESPIRATORY: Clear to auscultation bilaterally; No rales, rhonchi, wheezing  CARDIOVASCULAR: Regular rate and rhythm; No murmurs; no peripheral edema  GI: Soft, nontender, non distended  SKIN: Dry, intact, No rashes or lesions  MUSCULOSKELETAL: Full range of motion, normal strength  NEURO: extraocular movements intact, no tremor  PSYCH: Alert and oriented x 3, normal affect, normal mood    CAPILLARY BLOOD GLUCOSE      POCT Blood Glucose.: 174 mg/dL (28 Sep 2023 08:10)  POCT Blood Glucose.: 147 mg/dL (27 Sep 2023 21:46)  POCT Blood Glucose.: 255 mg/dL (27 Sep 2023 17:31)  POCT Blood Glucose.: 139 mg/dL (27 Sep 2023 12:37)      09-27    136  |  102  |  20  ----------------------------<  199<H>  4.2   |  27  |  0.88    eGFR: 79    Ca    9.1      09-27  Mg     2.10     09-27  Phos  2.9     09-27        A1C with Estimated Average Glucose Result: 8.5 % (09-22-23 @ 21:05)  A1C with Estimated Average Glucose Result: 11.5 % (11-22-22 @ 10:40)      Thyroid Function Tests:  09-23 @ 09:21 TSH 0.79 FreeT4 -- T3 -- Anti TPO -- Anti Thyroglobulin Ab -- TSI --                       NOTE INCOMPLETE/ IN PROGRESS. Please wait for attending attestation for official recommendations.    Chief Complaint: L eye vision loss    History: Patient refused insulin dosage this AM. Patient stated she was concerned that the dosage was too high, after discussion she is amenable to future insulin administration. The patient is to receive outpatient medications and Anthony at bedside. Patient originally expressed desire to return to her previous homeless shelter, but is now amenable to SNF given her reduced functional status.    MEDICATIONS  (STANDING):  dextrose 5%. 1000 milliLiter(s) (100 mL/Hr) IV Continuous <Continuous>  dextrose 5%. 1000 milliLiter(s) (50 mL/Hr) IV Continuous <Continuous>  dextrose 50% Injectable 25 Gram(s) IV Push once  dextrose 50% Injectable 12.5 Gram(s) IV Push once  dextrose 50% Injectable 25 Gram(s) IV Push once  enoxaparin Injectable 40 milliGRAM(s) SubCutaneous every 24 hours  glucagon  Injectable 1 milliGRAM(s) IntraMuscular once  influenza   Vaccine 0.5 milliLiter(s) IntraMuscular once  insulin glargine Injectable (LANTUS) 14 Unit(s) SubCutaneous at bedtime  insulin lispro (ADMELOG) corrective regimen sliding scale   SubCutaneous at bedtime  insulin lispro (ADMELOG) corrective regimen sliding scale   SubCutaneous three times a day before meals  insulin lispro Injectable (ADMELOG) 9 Unit(s) SubCutaneous three times a day before meals    MEDICATIONS  (PRN):  acetaminophen     Tablet .. 650 milliGRAM(s) Oral every 6 hours PRN Temp greater or equal to 38C (100.4F), Mild Pain (1 - 3)  dextrose Oral Gel 15 Gram(s) Oral once PRN Blood Glucose LESS THAN 70 milliGRAM(s)/deciliter  melatonin 3 milliGRAM(s) Oral at bedtime PRN Insomnia      Allergies    No Known Allergies    Intolerances      Review of Systems:  Constitutional: No fever  Eyes: No blurry vision  Neuro: No tremors  HEENT: No pain  Cardiovascular: No chest pain, palpitations  Respiratory: No SOB, no cough  GI: No nausea, vomiting, abdominal pain  : No dysuria  Skin: no rash  Psych: no depression  Endocrine: no polyuria, polydipsia  Hem/lymph: no swelling  Osteoporosis: no fractures    ALL OTHER SYSTEMS REVIEWED AND NEGATIVE      PHYSICAL EXAM:  VITALS: T(C): 36.8 (09-28-23 @ 06:37)  T(F): 98.3 (09-28-23 @ 06:37), Max: 98.6 (09-27-23 @ 15:19)  HR: 86 (09-28-23 @ 06:37) (86 - 96)  BP: 154/87 (09-28-23 @ 06:37) (150/93 - 154/87)  RR:  (17 - 17)  SpO2:  (100% - 100%)  Wt(kg): --  GENERAL: NAD, well-groomed, well-developed  EYES: No proptosis, no lid lag, anicteric; blind  HEENT:  Atraumatic, Normocephalic, moist mucous membranes  THYROID: Normal size, no palpable nodules  RESPIRATORY: Clear to auscultation bilaterally; No rales, rhonchi, wheezing  CARDIOVASCULAR: Regular rate and rhythm; No murmurs; no peripheral edema  GI: Soft, nontender, non distended  SKIN: Dry, intact, No rashes or lesions  MUSCULOSKELETAL: Full range of motion, normal strength  NEURO: extraocular movements intact, no tremor  PSYCH: Alert and oriented x 3, normal affect, normal mood    CAPILLARY BLOOD GLUCOSE      POCT Blood Glucose.: 174 mg/dL (28 Sep 2023 08:10)  POCT Blood Glucose.: 147 mg/dL (27 Sep 2023 21:46)  POCT Blood Glucose.: 255 mg/dL (27 Sep 2023 17:31)  POCT Blood Glucose.: 139 mg/dL (27 Sep 2023 12:37)      09-27    136  |  102  |  20  ----------------------------<  199<H>  4.2   |  27  |  0.88    eGFR: 79    Ca    9.1      09-27  Mg     2.10     09-27  Phos  2.9     09-27        A1C with Estimated Average Glucose Result: 8.5 % (09-22-23 @ 21:05)  A1C with Estimated Average Glucose Result: 11.5 % (11-22-22 @ 10:40)      Thyroid Function Tests:  09-23 @ 09:21 TSH 0.79 FreeT4 -- T3 -- Anti TPO -- Anti Thyroglobulin Ab -- TSI --                       Chief Complaint: L eye vision loss    History: Patient refused insulin dosage this AM. Patient stated she was concerned that the dosage was too high, after discussion she is amenable to future insulin administration. The patient is to receive outpatient medications and Anthony at bedside. Patient originally expressed desire to return to her previous homeless shelter, but is now amenable to SNF given her reduced functional status.    MEDICATIONS  (STANDING):  dextrose 5%. 1000 milliLiter(s) (100 mL/Hr) IV Continuous <Continuous>  dextrose 5%. 1000 milliLiter(s) (50 mL/Hr) IV Continuous <Continuous>  dextrose 50% Injectable 25 Gram(s) IV Push once  dextrose 50% Injectable 12.5 Gram(s) IV Push once  dextrose 50% Injectable 25 Gram(s) IV Push once  enoxaparin Injectable 40 milliGRAM(s) SubCutaneous every 24 hours  glucagon  Injectable 1 milliGRAM(s) IntraMuscular once  influenza   Vaccine 0.5 milliLiter(s) IntraMuscular once  insulin glargine Injectable (LANTUS) 14 Unit(s) SubCutaneous at bedtime  insulin lispro (ADMELOG) corrective regimen sliding scale   SubCutaneous at bedtime  insulin lispro (ADMELOG) corrective regimen sliding scale   SubCutaneous three times a day before meals  insulin lispro Injectable (ADMELOG) 9 Unit(s) SubCutaneous three times a day before meals    MEDICATIONS  (PRN):  acetaminophen     Tablet .. 650 milliGRAM(s) Oral every 6 hours PRN Temp greater or equal to 38C (100.4F), Mild Pain (1 - 3)  dextrose Oral Gel 15 Gram(s) Oral once PRN Blood Glucose LESS THAN 70 milliGRAM(s)/deciliter  melatonin 3 milliGRAM(s) Oral at bedtime PRN Insomnia      Allergies    No Known Allergies    Intolerances      Review of Systems:  Constitutional: No fever  Eyes: No blurry vision  Neuro: No tremors  HEENT: No pain  Cardiovascular: No chest pain, palpitations  Respiratory: No SOB, no cough  GI: No nausea, vomiting, abdominal pain  : No dysuria  Skin: no rash  Psych: no depression  Endocrine: no polyuria, polydipsia  Hem/lymph: no swelling  Osteoporosis: no fractures    ALL OTHER SYSTEMS REVIEWED AND NEGATIVE      PHYSICAL EXAM:  VITALS: T(C): 36.8 (09-28-23 @ 06:37)  T(F): 98.3 (09-28-23 @ 06:37), Max: 98.6 (09-27-23 @ 15:19)  HR: 86 (09-28-23 @ 06:37) (86 - 96)  BP: 154/87 (09-28-23 @ 06:37) (150/93 - 154/87)  RR:  (17 - 17)  SpO2:  (100% - 100%)  Wt(kg): --  GENERAL: NAD, well-groomed, well-developed  EYES: No proptosis, no lid lag, anicteric; blind  HEENT:  Atraumatic, Normocephalic, moist mucous membranes  THYROID: Normal size, no palpable nodules  RESPIRATORY: Clear to auscultation bilaterally; No rales, rhonchi, wheezing  CARDIOVASCULAR: Regular rate and rhythm; No murmurs; no peripheral edema  GI: Soft, nontender, non distended  SKIN: Dry, intact, No rashes or lesions  MUSCULOSKELETAL: Full range of motion, normal strength  NEURO: extraocular movements intact, no tremor  PSYCH: Alert and oriented x 3, normal affect, normal mood    CAPILLARY BLOOD GLUCOSE      POCT Blood Glucose.: 174 mg/dL (28 Sep 2023 08:10)  POCT Blood Glucose.: 147 mg/dL (27 Sep 2023 21:46)  POCT Blood Glucose.: 255 mg/dL (27 Sep 2023 17:31)  POCT Blood Glucose.: 139 mg/dL (27 Sep 2023 12:37)      09-27    136  |  102  |  20  ----------------------------<  199<H>  4.2   |  27  |  0.88    eGFR: 79    Ca    9.1      09-27  Mg     2.10     09-27  Phos  2.9     09-27        A1C with Estimated Average Glucose Result: 8.5 % (09-22-23 @ 21:05)  A1C with Estimated Average Glucose Result: 11.5 % (11-22-22 @ 10:40)      Thyroid Function Tests:  09-23 @ 09:21 TSH 0.79 FreeT4 -- T3 -- Anti TPO -- Anti Thyroglobulin Ab -- TSI --

## 2023-09-28 NOTE — PROGRESS NOTE ADULT - SUBJECTIVE AND OBJECTIVE BOX
[facility] Department of Hospital Medicine  Dr. Jocelyne Valdez  Pager: [phone]    Patient is a 52y old  Female who presents with a chief complaint of L eye vision loss (23 Sep 2023 14:20)    Subjective:  Pt seen and examined. Pt had refused insulin earlier but I explained to her the need for insulin and she is not agreeable while she is inpatient.     Vital Signs Last 24 Hrs  T(C): 36.8 (28 Sep 2023 12:46), Max: 37 (27 Sep 2023 15:19)  T(F): 98.2 (28 Sep 2023 12:46), Max: 98.6 (27 Sep 2023 15:19)  HR: 96 (28 Sep 2023 12:46) (86 - 96)  BP: 127/87 (28 Sep 2023 12:46) (127/87 - 154/87)  BP(mean): --  RR: 18 (28 Sep 2023 12:46) (17 - 18)  SpO2: 99% (28 Sep 2023 12:46) (99% - 100%)    Parameters below as of 28 Sep 2023 12:46  Patient On (Oxygen Delivery Method): room air      PHYSICAL EXAM:  Constitutional: resting comfortably in bed; NAD  Head: NC/AT  Eyes: PERRL, anicteric sclera; R eye minimal vision; L eye can see shadows/light  ENT: no nasal discharge; MMM  Neck: supple; no JVD  Respiratory: CTA B/L; no W/R/R  Cardiac: +S1/S2; RRR; no M/R/G  Gastrointestinal: soft, NT/ND; no rebound or guarding; +BSx4  Extremities: WWP, no clubbing or cyanosis; no peripheral edema  Musculoskeletal: NROM x4; no joint swelling, tenderness or erythema  Vascular: 2+ radial, DP/PT pulses B/L  Dermatologic: skin warm, dry and intact; no rashes, wounds, or scars  Neurologic: AAOx3; CNII-XII grossly intact; no focal deficits  Psychiatric: affect and characteristics of appearance, verbalizations, behaviors are appropriate    MEDICATIONS  (STANDING):  dextrose 5%. 1000 milliLiter(s) (100 mL/Hr) IV Continuous <Continuous>  dextrose 5%. 1000 milliLiter(s) (50 mL/Hr) IV Continuous <Continuous>  dextrose 50% Injectable 25 Gram(s) IV Push once  dextrose 50% Injectable 25 Gram(s) IV Push once  dextrose 50% Injectable 12.5 Gram(s) IV Push once  enoxaparin Injectable 40 milliGRAM(s) SubCutaneous every 24 hours  glucagon  Injectable 1 milliGRAM(s) IntraMuscular once  influenza   Vaccine 0.5 milliLiter(s) IntraMuscular once  insulin glargine Injectable (LANTUS) 14 Unit(s) SubCutaneous at bedtime  insulin lispro (ADMELOG) corrective regimen sliding scale   SubCutaneous three times a day before meals  insulin lispro (ADMELOG) corrective regimen sliding scale   SubCutaneous at bedtime  insulin lispro Injectable (ADMELOG) 8 Unit(s) SubCutaneous three times a day before meals    MEDICATIONS  (PRN):  acetaminophen     Tablet .. 650 milliGRAM(s) Oral every 6 hours PRN Temp greater or equal to 38C (100.4F), Mild Pain (1 - 3)  dextrose Oral Gel 15 Gram(s) Oral once PRN Blood Glucose LESS THAN 70 milliGRAM(s)/deciliter  melatonin 3 milliGRAM(s) Oral at bedtime PRN Insomnia      LABS:                                   11.7   5.21  )-----------( 189      ( 28 Sep 2023 11:52 )             37.2   09-28    139  |  98  |  21  ----------------------------<  278<H>  4.4   |  28  |  1.02    Ca    9.3      28 Sep 2023 11:52  Phos  2.8     09-28  Mg     2.10     09-28                   RADIOLOGY & ADDITIONAL TESTS: Reviewed.

## 2023-09-29 LAB
GLUCOSE BLDC GLUCOMTR-MCNC: 130 MG/DL — HIGH (ref 70–99)
GLUCOSE BLDC GLUCOMTR-MCNC: 134 MG/DL — HIGH (ref 70–99)
GLUCOSE BLDC GLUCOMTR-MCNC: 154 MG/DL — HIGH (ref 70–99)
GLUCOSE BLDC GLUCOMTR-MCNC: 169 MG/DL — HIGH (ref 70–99)
GLUCOSE BLDC GLUCOMTR-MCNC: 243 MG/DL — HIGH (ref 70–99)

## 2023-09-29 PROCEDURE — 99232 SBSQ HOSP IP/OBS MODERATE 35: CPT

## 2023-09-29 PROCEDURE — 99232 SBSQ HOSP IP/OBS MODERATE 35: CPT | Mod: GC

## 2023-09-29 RX ADMIN — Medication 9 UNIT(S): at 17:47

## 2023-09-29 RX ADMIN — INSULIN GLARGINE 14 UNIT(S): 100 INJECTION, SOLUTION SUBCUTANEOUS at 21:10

## 2023-09-29 RX ADMIN — Medication 1: at 08:59

## 2023-09-29 RX ADMIN — Medication 9 UNIT(S): at 09:00

## 2023-09-29 RX ADMIN — Medication 1: at 17:47

## 2023-09-29 RX ADMIN — ENOXAPARIN SODIUM 40 MILLIGRAM(S): 100 INJECTION SUBCUTANEOUS at 09:00

## 2023-09-29 RX ADMIN — Medication 9 UNIT(S): at 13:07

## 2023-09-29 NOTE — PROGRESS NOTE ADULT - ATTENDING COMMENTS
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 multiple fibrotic membranes seen in macula with traction and vitreous hemorrhage covering macula.   -HOB elevation, avoid aspirin if possible. No strenous activities.  Needs to see retina as soon as patient is discharged- emphasized to patient.
52F DM2 previously managed with insulin but off for 3 months, using friend's medication (may be sulfonylurea), newly blind, and is homeless.  Aim to discharge with 2 oral agents as outlined above. Social work eval for safe dc planning.    Leland Terry MD  Division of Endocrinology  Pager: [phone]    If after 6PM or before 9AM, or on weekends/holidays, please call endocrine answering service for assistance ([phone]).  For nonurgent matters email [email or web address] for assistance.
52F DM2 previously managed with insulin but off for 3 months, using friend's medication (may be sulfonylurea), newly blind, and is homeless.  Aim to discharge with 2 oral agents as outlined above. Social work eval for safe dc planning. Anthony teaching prior to dc will notify diabetes educator.      Leland Terry MD  Division of Endocrinology  Pager: [phone]    If after 6PM or before 9AM, or on weekends/holidays, please call endocrine answering service for assistance ([phone]).  For nonurgent matters email [email or web address] for assistance.
52F DM2 previously managed with insulin but off for 3 months, using friend's medication (may be sulfonylurea), newly blind, and is homeless.  Aim to discharge with 2 oral agents as outlined above. Social work eval for safe dc planning. Prescribe Prodigy talking glucometer.  Patient needs a PCP, lives in the [place], please help set up new PCP within [facility]. Patient also will need a note documeting her blindness to try to obtain services.      Leland Terry MD  Division of Endocrinology  Pager: [phone]    If after 6PM or before 9AM, or on weekends/holidays, please call endocrine answering service for assistance ([phone]).  For nonurgent matters email [email or web address] for assistance.

## 2023-09-29 NOTE — PROGRESS NOTE ADULT - SUBJECTIVE AND OBJECTIVE BOX
NOTE INCOMPLETE/ IN PROGRESS. Please wait for attending attestation for official recommendations.    Chief Complaint: L eye vision loss    History: The patient has been tolerating her insulin regimen well. The patient is not able to use the Anthony 3 glucometer, as it is unable to reader her her sugar levels, and her phone has not been able to interface with the machine following it being set up for blind use. The patient's primary team has ordered a speaking glucometer for the patient. The patient has expressed a desire to return to her shelter as she does not like the reviews for her potential SNF. The patient has been told she has not been accepted back to her shelter due to her increased needs. Patient has expressed a desire to leave AMA, but there is currently no safe discharge for the patient.    MEDICATIONS  (STANDING):  dextrose 5%. 1000 milliLiter(s) (50 mL/Hr) IV Continuous <Continuous>  dextrose 5%. 1000 milliLiter(s) (100 mL/Hr) IV Continuous <Continuous>  dextrose 50% Injectable 25 Gram(s) IV Push once  dextrose 50% Injectable 12.5 Gram(s) IV Push once  dextrose 50% Injectable 25 Gram(s) IV Push once  enoxaparin Injectable 40 milliGRAM(s) SubCutaneous every 24 hours  glucagon  Injectable 1 milliGRAM(s) IntraMuscular once  influenza   Vaccine 0.5 milliLiter(s) IntraMuscular once  insulin glargine Injectable (LANTUS) 14 Unit(s) SubCutaneous at bedtime  insulin lispro (ADMELOG) corrective regimen sliding scale   SubCutaneous at bedtime  insulin lispro (ADMELOG) corrective regimen sliding scale   SubCutaneous three times a day before meals  insulin lispro Injectable (ADMELOG) 9 Unit(s) SubCutaneous three times a day before meals    MEDICATIONS  (PRN):  acetaminophen     Tablet .. 650 milliGRAM(s) Oral every 6 hours PRN Temp greater or equal to 38C (100.4F), Mild Pain (1 - 3)  dextrose Oral Gel 15 Gram(s) Oral once PRN Blood Glucose LESS THAN 70 milliGRAM(s)/deciliter  melatonin 3 milliGRAM(s) Oral at bedtime PRN Insomnia      Allergies    No Known Allergies    Intolerances      Review of Systems:  Constitutional: No fever  Eyes: No blurry vision  Neuro: No tremors  HEENT: No pain  Cardiovascular: No chest pain, palpitations  Respiratory: No SOB, no cough  GI: No nausea, vomiting, abdominal pain  : No dysuria  Skin: no rash  Psych: no depression  Endocrine: no polyuria, polydipsia  Hem/lymph: no swelling  Osteoporosis: no fractures    ALL OTHER SYSTEMS REVIEWED AND NEGATIVE      PHYSICAL EXAM:  VITALS: T(C): 36.8 (09-29-23 @ 12:43)  T(F): 98.3 (09-29-23 @ 12:43), Max: 98.7 (09-28-23 @ 17:05)  HR: 91 (09-29-23 @ 12:43) (91 - 99)  BP: 142/75 (09-29-23 @ 12:43) (126/67 - 153/95)  RR:  (15 - 18)  SpO2:  (97% - 100%)  Wt(kg): --  GENERAL: NAD, well-groomed, well-developed  EYES: No proptosis, no lid lag, anicteric  HEENT:  Atraumatic, Normocephalic, moist mucous membranes  THYROID: Normal size, no palpable nodules  RESPIRATORY: Clear to auscultation bilaterally; No rales, rhonchi, wheezing  CARDIOVASCULAR: Regular rate and rhythm; No murmurs; no peripheral edema  GI: Soft, nontender, non distended  SKIN: Dry, intact, No rashes or lesions  MUSCULOSKELETAL: Full range of motion, normal strength  NEURO: extraocular movements intact, no tremor  PSYCH: Alert and oriented x 3, normal affect, normal mood    CAPILLARY BLOOD GLUCOSE      POCT Blood Glucose.: 134 mg/dL (29 Sep 2023 12:22)  POCT Blood Glucose.: 169 mg/dL (29 Sep 2023 08:48)  POCT Blood Glucose.: 243 mg/dL (29 Sep 2023 01:15)  POCT Blood Glucose.: 142 mg/dL (28 Sep 2023 22:14)  POCT Blood Glucose.: 187 mg/dL (28 Sep 2023 16:57)      09-28    139  |  98  |  21  ----------------------------<  278<H>  4.4   |  28  |  1.02    eGFR: 66    Ca    9.3      09-28  Mg     2.10     09-28  Phos  2.8     09-28        A1C with Estimated Average Glucose Result: 8.5 % (09-22-23 @ 21:05)  A1C with Estimated Average Glucose Result: 11.5 % (11-22-22 @ 10:40)      Thyroid Function Tests:  09-23 @ 09:21 TSH 0.79 FreeT4 -- T3 -- Anti TPO -- Anti Thyroglobulin Ab -- TSI --     Chief Complaint: L eye vision loss    History: The patient has been tolerating her insulin regimen well. The patient is not able to use the Anthony 3 glucometer, as it is unable to reader her her sugar levels, and her phone has not been able to interface with the machine following it being set up for blind use. The patient's primary team has ordered a speaking glucometer for the patient. The patient has expressed a desire to return to her shelter as she does not like the reviews for her potential SNF. The patient has been told she has not been accepted back to her shelter due to her increased needs. Patient has expressed a desire to leave AMA, but there is currently no safe discharge for the patient.    MEDICATIONS  (STANDING):  dextrose 5%. 1000 milliLiter(s) (50 mL/Hr) IV Continuous <Continuous>  dextrose 5%. 1000 milliLiter(s) (100 mL/Hr) IV Continuous <Continuous>  dextrose 50% Injectable 25 Gram(s) IV Push once  dextrose 50% Injectable 12.5 Gram(s) IV Push once  dextrose 50% Injectable 25 Gram(s) IV Push once  enoxaparin Injectable 40 milliGRAM(s) SubCutaneous every 24 hours  glucagon  Injectable 1 milliGRAM(s) IntraMuscular once  influenza   Vaccine 0.5 milliLiter(s) IntraMuscular once  insulin glargine Injectable (LANTUS) 14 Unit(s) SubCutaneous at bedtime  insulin lispro (ADMELOG) corrective regimen sliding scale   SubCutaneous at bedtime  insulin lispro (ADMELOG) corrective regimen sliding scale   SubCutaneous three times a day before meals  insulin lispro Injectable (ADMELOG) 9 Unit(s) SubCutaneous three times a day before meals    MEDICATIONS  (PRN):  acetaminophen     Tablet .. 650 milliGRAM(s) Oral every 6 hours PRN Temp greater or equal to 38C (100.4F), Mild Pain (1 - 3)  dextrose Oral Gel 15 Gram(s) Oral once PRN Blood Glucose LESS THAN 70 milliGRAM(s)/deciliter  melatonin 3 milliGRAM(s) Oral at bedtime PRN Insomnia      Allergies    No Known Allergies    Intolerances      Review of Systems:  Constitutional: No fever  Eyes: No blurry vision  Neuro: No tremors  HEENT: No pain  Cardiovascular: No chest pain, palpitations  Respiratory: No SOB, no cough  GI: No nausea, vomiting, abdominal pain  : No dysuria  Skin: no rash  Psych: no depression  Endocrine: no polyuria, polydipsia  Hem/lymph: no swelling  Osteoporosis: no fractures    ALL OTHER SYSTEMS REVIEWED AND NEGATIVE      PHYSICAL EXAM:  VITALS: T(C): 36.8 (09-29-23 @ 12:43)  T(F): 98.3 (09-29-23 @ 12:43), Max: 98.7 (09-28-23 @ 17:05)  HR: 91 (09-29-23 @ 12:43) (91 - 99)  BP: 142/75 (09-29-23 @ 12:43) (126/67 - 153/95)  RR:  (15 - 18)  SpO2:  (97% - 100%)  Wt(kg): --  GENERAL: NAD, well-groomed, well-developed  EYES: No proptosis, no lid lag, anicteric  HEENT:  Atraumatic, Normocephalic, moist mucous membranes  THYROID: Normal size, no palpable nodules  RESPIRATORY: Clear to auscultation bilaterally; No rales, rhonchi, wheezing  CARDIOVASCULAR: Regular rate and rhythm; No murmurs; no peripheral edema  GI: Soft, nontender, non distended  SKIN: Dry, intact, No rashes or lesions  MUSCULOSKELETAL: Full range of motion, normal strength  NEURO: extraocular movements intact, no tremor  PSYCH: Alert and oriented x 3, normal affect, normal mood    CAPILLARY BLOOD GLUCOSE      POCT Blood Glucose.: 134 mg/dL (29 Sep 2023 12:22)  POCT Blood Glucose.: 169 mg/dL (29 Sep 2023 08:48)  POCT Blood Glucose.: 243 mg/dL (29 Sep 2023 01:15)  POCT Blood Glucose.: 142 mg/dL (28 Sep 2023 22:14)  POCT Blood Glucose.: 187 mg/dL (28 Sep 2023 16:57)      09-28    139  |  98  |  21  ----------------------------<  278<H>  4.4   |  28  |  1.02    eGFR: 66    Ca    9.3      09-28  Mg     2.10     09-28  Phos  2.8     09-28        A1C with Estimated Average Glucose Result: 8.5 % (09-22-23 @ 21:05)  A1C with Estimated Average Glucose Result: 11.5 % (11-22-22 @ 10:40)      Thyroid Function Tests:  09-23 @ 09:21 TSH 0.79 FreeT4 -- T3 -- Anti TPO -- Anti Thyroglobulin Ab -- TSI --

## 2023-09-29 NOTE — DIETITIAN INITIAL EVALUATION ADULT - NS FNS WEIGHT CHANGE REASON
There's no current Ht and Weight in EMR. Pt's CBW obtained via bed scale today 158lbs. Pt's Ht obtained from HIE. Pt unable to recall her recent weight. As per HIE, pt's previous weight 165lbs (5/16/23). Weight trend reflects nonsignificant weight loss of 7lbs/4.24% x 4 months./unintentional

## 2023-09-29 NOTE — ADVANCED PRACTICE NURSE CONSULT - ASSESSMENT
Saw patient at bedside. Patient did not want to use phone for Anthony CGM. Patient states when you are blind your misplaces things. She misses her phone frequently. She did know how this was going to be useful. Patient let better getting a talking meter. Spoke with Guanako SMITH to order a talking meter. Patients concerns and preference and case discussed with endocrine team. Case discussed with primary RN. Patient closing eyes and did not want to talk to [facility] anymore. Patient will be discharged on oral medication instead of insulin to simplify discharge plan.

## 2023-09-29 NOTE — PROGRESS NOTE ADULT - SUBJECTIVE AND OBJECTIVE BOX
[facility] Department of Hospital Medicine  Dr. Jocelyne Valdez  Pager: [phone]    Patient is a 52y old  Female who presents with a chief complaint of L eye vision loss (23 Sep 2023 14:20)    Subjective:  Pt seen and examined. Very upset this morning and asking to leave. She is unhappy about her room. She also does not want to go to [facility] given bad reviews she heard about the place. She also is upset that free style chas didnt come with  a reader. Says her phone is not working now since someone made it blindness accessible. Asking to leave to shelter but I explained that she is not accepted to shelter and she is not a candidate for shelter as she needs assistance in daily living given her new disability Patient still asking to leave, I explained to her that she may leave AMA. I cannot discharge her as we dont have a safe DC plan currently.     Vital Signs Last 24 Hrs  T(C): 36.8 (29 Sep 2023 12:43), Max: 37.1 (28 Sep 2023 17:05)  T(F): 98.3 (29 Sep 2023 12:43), Max: 98.7 (28 Sep 2023 17:05)  HR: 91 (29 Sep 2023 12:43) (91 - 99)  BP: 142/75 (29 Sep 2023 12:43) (126/67 - 153/95)  BP(mean): --  RR: 18 (29 Sep 2023 12:43) (15 - 18)  SpO2: 99% (29 Sep 2023 12:43) (97% - 100%)    Parameters below as of 29 Sep 2023 12:43  Patient On (Oxygen Delivery Method): room air      PHYSICAL EXAM:  Constitutional: resting comfortably in bed; NAD  Head: NC/AT  Eyes: PERRL, anicteric sclera; R eye minimal vision; L eye can see shadows/light  ENT: no nasal discharge; MMM  Neck: supple; no JVD  Respiratory: CTA B/L; no W/R/R  Cardiac: +S1/S2; RRR; no M/R/G  Gastrointestinal: soft, NT/ND; no rebound or guarding; +BSx4  Extremities: WWP, no clubbing or cyanosis; no peripheral edema  Musculoskeletal: NROM x4; no joint swelling, tenderness or erythema  Vascular: 2+ radial, DP/PT pulses B/L  Dermatologic: skin warm, dry and intact; no rashes, wounds, or scars  Neurologic: AAOx3; CNII-XII grossly intact; no focal deficits  Psychiatric: affect and characteristics of appearance, verbalizations, behaviors are appropriate    MEDICATIONS  (STANDING):  dextrose 5%. 1000 milliLiter(s) (100 mL/Hr) IV Continuous <Continuous>  dextrose 5%. 1000 milliLiter(s) (50 mL/Hr) IV Continuous <Continuous>  dextrose 50% Injectable 25 Gram(s) IV Push once  dextrose 50% Injectable 25 Gram(s) IV Push once  dextrose 50% Injectable 12.5 Gram(s) IV Push once  enoxaparin Injectable 40 milliGRAM(s) SubCutaneous every 24 hours  glucagon  Injectable 1 milliGRAM(s) IntraMuscular once  influenza   Vaccine 0.5 milliLiter(s) IntraMuscular once  insulin glargine Injectable (LANTUS) 14 Unit(s) SubCutaneous at bedtime  insulin lispro (ADMELOG) corrective regimen sliding scale   SubCutaneous three times a day before meals  insulin lispro (ADMELOG) corrective regimen sliding scale   SubCutaneous at bedtime  insulin lispro Injectable (ADMELOG) 8 Unit(s) SubCutaneous three times a day before meals    MEDICATIONS  (PRN):  acetaminophen     Tablet .. 650 milliGRAM(s) Oral every 6 hours PRN Temp greater or equal to 38C (100.4F), Mild Pain (1 - 3)  dextrose Oral Gel 15 Gram(s) Oral once PRN Blood Glucose LESS THAN 70 milliGRAM(s)/deciliter  melatonin 3 milliGRAM(s) Oral at bedtime PRN Insomnia      LABS:                                   11.7   5.21  )-----------( 189      ( 28 Sep 2023 11:52 )             37.2   09-28    139  |  98  |  21  ----------------------------<  278<H>  4.4   |  28  |  1.02    Ca    9.3      28 Sep 2023 11:52  Phos  2.8     09-28  Mg     2.10     09-28        RADIOLOGY & ADDITIONAL TESTS: Reviewed.

## 2023-09-29 NOTE — DIETITIAN INITIAL EVALUATION ADULT - PERTINENT MEDS FT
MEDICATIONS  (STANDING):  dextrose 5%. 1000 milliLiter(s) (50 mL/Hr) IV Continuous <Continuous>  dextrose 5%. 1000 milliLiter(s) (100 mL/Hr) IV Continuous <Continuous>  dextrose 50% Injectable 25 Gram(s) IV Push once  dextrose 50% Injectable 12.5 Gram(s) IV Push once  dextrose 50% Injectable 25 Gram(s) IV Push once  enoxaparin Injectable 40 milliGRAM(s) SubCutaneous every 24 hours  glucagon  Injectable 1 milliGRAM(s) IntraMuscular once  influenza   Vaccine 0.5 milliLiter(s) IntraMuscular once  insulin glargine Injectable (LANTUS) 14 Unit(s) SubCutaneous at bedtime  insulin lispro (ADMELOG) corrective regimen sliding scale   SubCutaneous at bedtime  insulin lispro (ADMELOG) corrective regimen sliding scale   SubCutaneous three times a day before meals  insulin lispro Injectable (ADMELOG) 9 Unit(s) SubCutaneous three times a day before meals    MEDICATIONS  (PRN):  acetaminophen     Tablet .. 650 milliGRAM(s) Oral every 6 hours PRN Temp greater or equal to 38C (100.4F), Mild Pain (1 - 3)  dextrose Oral Gel 15 Gram(s) Oral once PRN Blood Glucose LESS THAN 70 milliGRAM(s)/deciliter  melatonin 3 milliGRAM(s) Oral at bedtime PRN Insomnia

## 2023-09-29 NOTE — ADVANCED PRACTICE NURSE CONSULT - REASON FOR CONSULT
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 Endocrinology consulted for outpatient medication recommendations in the setting of the patient's complex social history.

## 2023-09-29 NOTE — DIETITIAN INITIAL EVALUATION ADULT - OTHER INFO
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     Patient seen at bedside. Reports appetite has been good in hospital, able to consume % of her meals. Pt gets agitated once breakfast arrived. Pt complains light is not bright enough in the room for her to eat her breakfast. States " Why am I in this room? I can't see." Explained she can't see probably due to retinal detachment of the left eye. Helped pt with breakfast set up. RN made aware. Recommend meal set up assistance PRN. Denies chewing and swallowing difficulties. Denies any GI distress (nausea/vomiting/diarrhea/constipation.)  Last BM today, small amount. Pt labs notable with A1C 8.5%, A1C is improved compared to previous A1C 11.5% (11/2/22). Possibly due to decreased PO intake. Endo is following. Recommend consider add Consistent Carbohydrate for a better glycemic control. Pt noted previously refused insulin as per Event Note 9/28. As per RN pt agreeable to receive insulin after PA spoke with pt. Offered nutrition education, pt refused. Pt made aware RD remains available PRN. Pt came from shelter, SW is following. RD to remain available for further nutritional interventions as indicated.

## 2023-09-29 NOTE — PROGRESS NOTE ADULT - ASSESSMENT
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 Endocrinology consulted for outpatient medication recommendations in the setting of the patient's complex social history.    #Diabetes Mellitus type 2  On insulin in the past, not on insulin past 3 months, using friend's medication (sulfonylurea), homeless, eats one meal per day  - Outpatient, the patient was on 17 units basal insulin with 10 units of pre-meal insulin  - The patient has recently lost vision in both of her eyes (R eye 2022, L eye 2023) and is undomiciled  - Current A1C is 8.5% after three months off of insulin    #Inpatient  - Continue with Lantus 14 units at bedtime   - Continue admelog at 9 units premeal  - Continue with low insulin corrective scale for meals and at bedtime    #Outpatient  - The patient's A1C has improved since her previous hospitalization in 11/22 from 11.5% to 8.5%, the patient stopped her basal-bolus regimen three months ago and supplemented with what is presumed to be a sulfonylurea at an unknown dosage that she received from her friend  - The patient has transitioned to an intermittent fasting diet since she has entered the homeless shelter and has lost weight, those she is not certain of how much  - The patient's overall functional status and undomiciled status make it unlikely that she would be able to administer and store insulin on her own    - We would recommend the following oral regimen:      - Metformin ER 500mg x 2 tabs daily for one week with meal, increase to Metformin ER 500mg x 4 tabs daily with meal (usually eats one meal per day).      - Jardiance 10mg daily    - The patient can follow-up with the [facility]. Located at [address] [place] [address], [facility] [address], [place] [address]. [phone]. She will require close monitoring of her diabetes following discharge.    - Please send prescriptions for diabetes supplies (glucometer, test strips, lancets, alcohol swabs, insulin pen needles).  - Patient has been approved for [employer] 3 glucose monitor, will request diabetes education for the patient to understand use of the monitor i/s/o her recent loss of vision     - Routine outpatient opthalmology & podiatry evaluations recommended.    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 Endocrinology consulted for outpatient medication recommendations in the setting of the patient's complex social history.    #Diabetes Mellitus type 2  On insulin in the past, not on insulin past 3 months, using friend's medication (sulfonylurea), homeless, eats one meal per day  - Outpatient, the patient was on 17 units basal insulin with 10 units of pre-meal insulin  - The patient has recently lost vision in both of her eyes (R eye 2022, L eye 2023) and is undomiciled  - Current A1C is 8.5% after three months off of insulin    #Inpatient  - Continue with Lantus 14 units at bedtime   - Continue admelog at 9 units premeal  - Continue with low insulin corrective scale for meals and at bedtime    #Outpatient  - The patient's A1C has improved since her previous hospitalization in 11/22 from 11.5% to 8.5%, the patient stopped her basal-bolus regimen three months ago and supplemented with what is presumed to be a sulfonylurea at an unknown dosage that she received from her friend  - The patient has transitioned to an intermittent fasting diet since she has entered the homeless shelter and has lost weight, those she is not certain of how much  - The patient's overall functional status and undomiciled status make it unlikely that she would be able to administer and store insulin on her own    - We would recommend the following oral regimen:      - Metformin ER 500mg x 2 tabs daily for one week with meal, increase to Metformin ER 500mg x 4 tabs daily with meal (usually eats one meal per day).      - Jardiance 10mg daily    - The patient can follow-up with the [facility]. Located at [address] [place] [address], [facility] [address], [place] [address]. [phone]. She will require close monitoring of her diabetes following discharge.    - Please send prescriptions for diabetes supplies (glucometer, test strips, lancets, alcohol swabs, insulin pen needles).  *Prescribe Prodigy talking glucometer given patient is blind  - Patient has been approved for [employer] 3 glucose monitor, will request diabetes education for the patient to understand use of the monitor i/s/o her recent loss of vision - patient prefers not to use the Anthony and instead to use the talking glucometer above     - Routine outpatient opthalmology & podiatry evaluations recommended.    #HLD  - Patient non-adherent to home dose statin, unwilling to initiate new therapy in-patient  - Goal LDL in diabetes mellitus is <70    #HTN  - Patient not currently on antihypertensive medication  - Goal BP in diabetes mellitus is <130/80  - Will continue to monitor

## 2023-09-29 NOTE — ADVANCED PRACTICE NURSE CONSULT - RECOMMEDATIONS
Unsure of discharge disposition. Endocrine team following while in the hospital. [facility] will follow up as needed.

## 2023-09-29 NOTE — DIETITIAN INITIAL EVALUATION ADULT - PROBLEM SELECTOR PLAN 2
History of IDDM with recent insurance switch causing issues regarding obtaining medications  - A1c 8.5 (improved from prior hospitalization of >11)  - supposed to be on Basaglar 17 units QHS and Humalog 10 units TID premeals  - has not used insulin in around a month, reportedly used old leftover Metformin in the interim  - will restart basal-bolus at reduced dosage (0.8x home dosage), adjust as needed based on correctional  - double check insulin insurance coverage prior to discharge  - b/l feet numbness likely iso neuropathy from diabetes. Will obtain folate, B12 to assess further given poor diet  - diabetes specialist, nutritionist

## 2023-09-29 NOTE — DIETITIAN INITIAL EVALUATION ADULT - ENTER FROM (G/KG)
Detail Level: Generalized Quality 226: Preventive Care And Screening: Tobacco Use: Screening And Cessation Intervention: Patient screened for tobacco use and is an ex/non-smoker Quality 130: Documentation Of Current Medications In The Medical Record: Current Medications Documented Quality 431: Preventive Care And Screening: Unhealthy Alcohol Use - Screening: Patient screened for unhealthy alcohol use using a single question and scores less than 2 times per year 1

## 2023-09-29 NOTE — DIETITIAN INITIAL EVALUATION ADULT - ORAL INTAKE PTA/DIET HISTORY
DISPLAY PLAN FREE TEXT DISPLAY PLAN FREE TEXT DISPLAY PLAN FREE TEXT DISPLAY PLAN FREE TEXT DISPLAY PLAN FREE TEXT DISPLAY PLAN FREE TEXT DISPLAY PLAN FREE TEXT DISPLAY PLAN FREE TEXT DISPLAY PLAN FREE TEXT DISPLAY PLAN FREE TEXT DISPLAY PLAN FREE TEXT DISPLAY PLAN FREE TEXT DISPLAY PLAN FREE TEXT DISPLAY PLAN FREE TEXT DISPLAY PLAN FREE TEXT DISPLAY PLAN FREE TEXT DISPLAY PLAN FREE TEXT DISPLAY PLAN FREE TEXT DISPLAY PLAN FREE TEXT DISPLAY PLAN FREE TEXT DISPLAY PLAN FREE TEXT DISPLAY PLAN FREE TEXT DISPLAY PLAN FREE TEXT DISPLAY PLAN FREE TEXT DISPLAY PLAN FREE TEXT DISPLAY PLAN FREE TEXT DISPLAY PLAN FREE TEXT DISPLAY PLAN FREE TEXT DISPLAY PLAN FREE TEXT DISPLAY PLAN FREE TEXT DISPLAY PLAN FREE TEXT Patient reports has a good appetite in general. However, pt came from shelter. Reports poor to fair PO intake in shelter to due limited selection of foods. Pt states she picked food that she likes in shelter i.e bread with butter, coffee, and banana. Likely pt's PO intake is not meeting her nutritional needs in setting of food insecurity. SW is following now. Pt endorses some weight loss, but unable to quantify.

## 2023-09-29 NOTE — DIETITIAN INITIAL EVALUATION ADULT - ADD RECOMMEND
1) Recommend add Consistent Carbohydrate diet for a better glycemic control.   2) Encourage PO intake and honor food preferences as able.   3) Monitor PO intake, Labs, weights, BMs, and skin integrity.   4) Recommend continue monitor POCT and adjust insulin PRN.   5) Pt refuse nutrition education. RD to remain available for further nutritional interventions as indicated.

## 2023-09-29 NOTE — DIETITIAN INITIAL EVALUATION ADULT - PERTINENT LABORATORY DATA
09-28    139  |  98  |  21  ----------------------------<  278<H>  4.4   |  28  |  1.02    Ca    9.3      28 Sep 2023 11:52  Phos  2.8     09-28  Mg     2.10     09-28    POCT Blood Glucose.: 243 mg/dL (09-29-23 @ 01:15)  A1C with Estimated Average Glucose Result: 8.5 % (09-22-23 @ 21:05)  A1C with Estimated Average Glucose Result: 11.5 % (11-22-22 @ 10:40)

## 2023-09-30 LAB
ANION GAP SERPL CALC-SCNC: 8 MMOL/L — SIGNIFICANT CHANGE UP (ref 7–14)
BUN SERPL-MCNC: 22 MG/DL — SIGNIFICANT CHANGE UP (ref 7–23)
CALCIUM SERPL-MCNC: 9.2 MG/DL — SIGNIFICANT CHANGE UP (ref 8.4–10.5)
CHLORIDE SERPL-SCNC: 102 MMOL/L — SIGNIFICANT CHANGE UP (ref 98–107)
CO2 SERPL-SCNC: 28 MMOL/L — SIGNIFICANT CHANGE UP (ref 22–31)
CREAT SERPL-MCNC: 0.93 MG/DL — SIGNIFICANT CHANGE UP (ref 0.5–1.3)
EGFR: 74 ML/MIN/1.73M2 — SIGNIFICANT CHANGE UP
GLUCOSE BLDC GLUCOMTR-MCNC: 113 MG/DL — HIGH (ref 70–99)
GLUCOSE BLDC GLUCOMTR-MCNC: 158 MG/DL — HIGH (ref 70–99)
GLUCOSE BLDC GLUCOMTR-MCNC: 159 MG/DL — HIGH (ref 70–99)
GLUCOSE BLDC GLUCOMTR-MCNC: 172 MG/DL — HIGH (ref 70–99)
GLUCOSE SERPL-MCNC: 139 MG/DL — HIGH (ref 70–99)
HCT VFR BLD CALC: 35.3 % — SIGNIFICANT CHANGE UP (ref 34.5–45)
HGB BLD-MCNC: 11.2 G/DL — LOW (ref 11.5–15.5)
MAGNESIUM SERPL-MCNC: 2.1 MG/DL — SIGNIFICANT CHANGE UP (ref 1.6–2.6)
MCHC RBC-ENTMCNC: 26.5 PG — LOW (ref 27–34)
MCHC RBC-ENTMCNC: 31.7 GM/DL — LOW (ref 32–36)
MCV RBC AUTO: 83.6 FL — SIGNIFICANT CHANGE UP (ref 80–100)
NRBC # BLD: 0 /100 WBCS — SIGNIFICANT CHANGE UP (ref 0–0)
NRBC # FLD: 0 K/UL — SIGNIFICANT CHANGE UP (ref 0–0)
PHOSPHATE SERPL-MCNC: 3.9 MG/DL — SIGNIFICANT CHANGE UP (ref 2.5–4.5)
PLATELET # BLD AUTO: 198 K/UL — SIGNIFICANT CHANGE UP (ref 150–400)
POTASSIUM SERPL-MCNC: 4.2 MMOL/L — SIGNIFICANT CHANGE UP (ref 3.5–5.3)
POTASSIUM SERPL-SCNC: 4.2 MMOL/L — SIGNIFICANT CHANGE UP (ref 3.5–5.3)
RBC # BLD: 4.22 M/UL — SIGNIFICANT CHANGE UP (ref 3.8–5.2)
RBC # FLD: 13.8 % — SIGNIFICANT CHANGE UP (ref 10.3–14.5)
SODIUM SERPL-SCNC: 138 MMOL/L — SIGNIFICANT CHANGE UP (ref 135–145)
WBC # BLD: 6.19 K/UL — SIGNIFICANT CHANGE UP (ref 3.8–10.5)
WBC # FLD AUTO: 6.19 K/UL — SIGNIFICANT CHANGE UP (ref 3.8–10.5)

## 2023-09-30 PROCEDURE — 99232 SBSQ HOSP IP/OBS MODERATE 35: CPT

## 2023-09-30 RX ADMIN — Medication 1: at 17:36

## 2023-09-30 RX ADMIN — Medication 650 MILLIGRAM(S): at 07:10

## 2023-09-30 RX ADMIN — Medication 9 UNIT(S): at 17:36

## 2023-09-30 RX ADMIN — Medication 650 MILLIGRAM(S): at 20:27

## 2023-09-30 RX ADMIN — Medication 1: at 09:06

## 2023-09-30 RX ADMIN — ENOXAPARIN SODIUM 40 MILLIGRAM(S): 100 INJECTION SUBCUTANEOUS at 09:08

## 2023-09-30 RX ADMIN — INSULIN GLARGINE 14 UNIT(S): 100 INJECTION, SOLUTION SUBCUTANEOUS at 21:33

## 2023-09-30 RX ADMIN — Medication 650 MILLIGRAM(S): at 06:39

## 2023-09-30 RX ADMIN — Medication 9 UNIT(S): at 09:07

## 2023-09-30 RX ADMIN — Medication 9 UNIT(S): at 12:20

## 2023-09-30 RX ADMIN — Medication 650 MILLIGRAM(S): at 21:27

## 2023-09-30 NOTE — PROGRESS NOTE ADULT - SUBJECTIVE AND OBJECTIVE BOX
[facility] Department of Hospital Medicine  Dr. Jocelyne Valdez  Pager: [phone]    Patient is a 52y old  Female who presents with a chief complaint of L eye vision loss (23 Sep 2023 14:20)    Subjective:  Pt seen and examined. No complaints. Wants us to check her medicaid status.     Vital Signs Last 24 Hrs  T(C): 36.4 (30 Sep 2023 05:34), Max: 37 (29 Sep 2023 22:25)  T(F): 97.6 (30 Sep 2023 05:34), Max: 98.6 (29 Sep 2023 22:25)  HR: 92 (30 Sep 2023 05:34) (70 - 92)  BP: 127/69 (30 Sep 2023 05:34) (127/69 - 142/75)  BP(mean): --  RR: 15 (30 Sep 2023 05:34) (15 - 18)  SpO2: 98% (30 Sep 2023 05:34) (98% - 99%)    Parameters below as of 30 Sep 2023 05:34  Patient On (Oxygen Delivery Method): room air    PHYSICAL EXAM:  Constitutional: resting comfortably in bed; NAD  Head: NC/AT  Eyes: PERRL, anicteric sclera; R eye minimal vision; L eye can see shadows/light  ENT: no nasal discharge; MMM  Neck: supple; no JVD  Respiratory: CTA B/L; no W/R/R  Cardiac: +S1/S2; RRR; no M/R/G  Gastrointestinal: soft, NT/ND; no rebound or guarding; +BSx4  Extremities: WWP, no clubbing or cyanosis; no peripheral edema  Musculoskeletal: NROM x4; no joint swelling, tenderness or erythema  Vascular: 2+ radial, DP/PT pulses B/L  Dermatologic: skin warm, dry and intact; no rashes, wounds, or scars  Neurologic: AAOx3; CNII-XII grossly intact; no focal deficits  Psychiatric: affect and characteristics of appearance, verbalizations, behaviors are appropriate    MEDICATIONS  (STANDING):  dextrose 5%. 1000 milliLiter(s) (100 mL/Hr) IV Continuous <Continuous>  dextrose 5%. 1000 milliLiter(s) (50 mL/Hr) IV Continuous <Continuous>  dextrose 50% Injectable 25 Gram(s) IV Push once  dextrose 50% Injectable 25 Gram(s) IV Push once  dextrose 50% Injectable 12.5 Gram(s) IV Push once  enoxaparin Injectable 40 milliGRAM(s) SubCutaneous every 24 hours  glucagon  Injectable 1 milliGRAM(s) IntraMuscular once  influenza   Vaccine 0.5 milliLiter(s) IntraMuscular once  insulin glargine Injectable (LANTUS) 14 Unit(s) SubCutaneous at bedtime  insulin lispro (ADMELOG) corrective regimen sliding scale   SubCutaneous three times a day before meals  insulin lispro (ADMELOG) corrective regimen sliding scale   SubCutaneous at bedtime  insulin lispro Injectable (ADMELOG) 8 Unit(s) SubCutaneous three times a day before meals    MEDICATIONS  (PRN):  acetaminophen     Tablet .. 650 milliGRAM(s) Oral every 6 hours PRN Temp greater or equal to 38C (100.4F), Mild Pain (1 - 3)  dextrose Oral Gel 15 Gram(s) Oral once PRN Blood Glucose LESS THAN 70 milliGRAM(s)/deciliter  melatonin 3 milliGRAM(s) Oral at bedtime PRN Insomnia      LABS:                                   11.2   6.19  )-----------( 198      ( 30 Sep 2023 06:10 )             35.3   09-30    138  |  102  |  22  ----------------------------<  139<H>  4.2   |  28  |  0.93    Ca    9.2      30 Sep 2023 06:10  Phos  3.9     09-30  Mg     2.10     09-30        RADIOLOGY & ADDITIONAL TESTS: Reviewed.

## 2023-10-01 LAB
GLUCOSE BLDC GLUCOMTR-MCNC: 117 MG/DL — HIGH (ref 70–99)
GLUCOSE BLDC GLUCOMTR-MCNC: 132 MG/DL — HIGH (ref 70–99)
GLUCOSE BLDC GLUCOMTR-MCNC: 189 MG/DL — HIGH (ref 70–99)
GLUCOSE BLDC GLUCOMTR-MCNC: 210 MG/DL — HIGH (ref 70–99)
GLUCOSE BLDC GLUCOMTR-MCNC: 301 MG/DL — HIGH (ref 70–99)

## 2023-10-01 PROCEDURE — 99232 SBSQ HOSP IP/OBS MODERATE 35: CPT

## 2023-10-01 RX ORDER — ASPIRIN/CALCIUM CARB/MAGNESIUM 324 MG
81 TABLET ORAL DAILY
Refills: 0 | Status: DISCONTINUED | OUTPATIENT
Start: 2023-10-01 | End: 2023-10-26

## 2023-10-01 RX ADMIN — Medication 2: at 12:26

## 2023-10-01 RX ADMIN — Medication 9 UNIT(S): at 17:36

## 2023-10-01 RX ADMIN — ENOXAPARIN SODIUM 40 MILLIGRAM(S): 100 INJECTION SUBCUTANEOUS at 08:41

## 2023-10-01 RX ADMIN — Medication 9 UNIT(S): at 08:40

## 2023-10-01 RX ADMIN — Medication 4: at 17:35

## 2023-10-01 RX ADMIN — INSULIN GLARGINE 14 UNIT(S): 100 INJECTION, SOLUTION SUBCUTANEOUS at 21:32

## 2023-10-01 RX ADMIN — Medication 9 UNIT(S): at 12:26

## 2023-10-01 NOTE — PROGRESS NOTE ADULT - TIME BILLING
Time spent includes direct patient care  (interview and examination of patient), discussion with other providers, support staff and/or patient's family members, review of medical records, ordering diagnostic tests and analyzing results, and documentation.

## 2023-10-01 NOTE — PROGRESS NOTE ADULT - SUBJECTIVE AND OBJECTIVE BOX
[facility] Department of Hospital Medicine  Dr. Jocelyne Valdez  Pager: [phone]    Patient is a 52y old  Female who presents with a chief complaint of L eye vision loss (23 Sep 2023 14:20)    Subjective:  Pt seen and examined. Very frustrated. Feels that system is letting her down. Wants better resources. Says that we have not fixed her phone yet - she sent it with her mother to get fixed. Also does not want to go to SNF. Wants to go to shelter but I explained that she is not independent to go to shelter plus shelter has to accept her as patient. Patient needs a functioning speaking glucometer and safe dispo before discharge. I explained to patient that she may leave AMA if she choses.     Vital Signs Last 24 Hrs  T(C): 36.6 (01 Oct 2023 05:10), Max: 36.9 (30 Sep 2023 12:43)  T(F): 97.9 (01 Oct 2023 05:10), Max: 98.5 (30 Sep 2023 12:43)  HR: 82 (01 Oct 2023 05:10) (82 - 97)  BP: 148/85 (01 Oct 2023 05:10) (128/71 - 148/85)  BP(mean): --  RR: 18 (01 Oct 2023 05:10) (18 - 19)  SpO2: 99% (01 Oct 2023 05:10) (99% - 99%)    Parameters below as of 01 Oct 2023 05:10  Patient On (Oxygen Delivery Method): room air      PHYSICAL EXAM:  Constitutional: resting comfortably in bed; NAD  Head: NC/AT  Eyes: PERRL, anicteric sclera; R eye minimal vision; L eye can see shadows/light  ENT: no nasal discharge; MMM  Neck: supple; no JVD  Respiratory: CTA B/L; no W/R/R  Cardiac: +S1/S2; RRR; no M/R/G  Gastrointestinal: soft, NT/ND; no rebound or guarding; +BSx4  Extremities: WWP, no clubbing or cyanosis; no peripheral edema  Musculoskeletal: NROM x4; no joint swelling, tenderness or erythema  Vascular: 2+ radial, DP/PT pulses B/L  Dermatologic: skin warm, dry and intact; no rashes, wounds, or scars  Neurologic: AAOx3; CNII-XII grossly intact; no focal deficits  Psychiatric: affect and characteristics of appearance, verbalizations, behaviors are appropriate    MEDICATIONS  (STANDING):  dextrose 5%. 1000 milliLiter(s) (100 mL/Hr) IV Continuous <Continuous>  dextrose 5%. 1000 milliLiter(s) (50 mL/Hr) IV Continuous <Continuous>  dextrose 50% Injectable 25 Gram(s) IV Push once  dextrose 50% Injectable 25 Gram(s) IV Push once  dextrose 50% Injectable 12.5 Gram(s) IV Push once  enoxaparin Injectable 40 milliGRAM(s) SubCutaneous every 24 hours  glucagon  Injectable 1 milliGRAM(s) IntraMuscular once  influenza   Vaccine 0.5 milliLiter(s) IntraMuscular once  insulin glargine Injectable (LANTUS) 14 Unit(s) SubCutaneous at bedtime  insulin lispro (ADMELOG) corrective regimen sliding scale   SubCutaneous three times a day before meals  insulin lispro (ADMELOG) corrective regimen sliding scale   SubCutaneous at bedtime  insulin lispro Injectable (ADMELOG) 8 Unit(s) SubCutaneous three times a day before meals    MEDICATIONS  (PRN):  acetaminophen     Tablet .. 650 milliGRAM(s) Oral every 6 hours PRN Temp greater or equal to 38C (100.4F), Mild Pain (1 - 3)  dextrose Oral Gel 15 Gram(s) Oral once PRN Blood Glucose LESS THAN 70 milliGRAM(s)/deciliter  melatonin 3 milliGRAM(s) Oral at bedtime PRN Insomnia      LABS:                                   11.2   6.19  )-----------( 198      ( 30 Sep 2023 06:10 )             35.3   09-30    138  |  102  |  22  ----------------------------<  139<H>  4.2   |  28  |  0.93    Ca    9.2      30 Sep 2023 06:10  Phos  3.9     09-30  Mg     2.10     09-30      RADIOLOGY & ADDITIONAL TESTS: Reviewed.

## 2023-10-02 LAB
GLUCOSE BLDC GLUCOMTR-MCNC: 119 MG/DL — HIGH (ref 70–99)
GLUCOSE BLDC GLUCOMTR-MCNC: 124 MG/DL — HIGH (ref 70–99)
GLUCOSE BLDC GLUCOMTR-MCNC: 126 MG/DL — HIGH (ref 70–99)
GLUCOSE BLDC GLUCOMTR-MCNC: 128 MG/DL — HIGH (ref 70–99)
GLUCOSE BLDC GLUCOMTR-MCNC: 143 MG/DL — HIGH (ref 70–99)
GLUCOSE BLDC GLUCOMTR-MCNC: 172 MG/DL — HIGH (ref 70–99)

## 2023-10-02 PROCEDURE — 99232 SBSQ HOSP IP/OBS MODERATE 35: CPT

## 2023-10-02 RX ADMIN — ENOXAPARIN SODIUM 40 MILLIGRAM(S): 100 INJECTION SUBCUTANEOUS at 08:35

## 2023-10-02 RX ADMIN — Medication 9 UNIT(S): at 12:19

## 2023-10-02 RX ADMIN — Medication 650 MILLIGRAM(S): at 01:01

## 2023-10-02 RX ADMIN — INSULIN GLARGINE 14 UNIT(S): 100 INJECTION, SOLUTION SUBCUTANEOUS at 21:27

## 2023-10-02 RX ADMIN — Medication 650 MILLIGRAM(S): at 01:45

## 2023-10-02 RX ADMIN — Medication 9 UNIT(S): at 09:38

## 2023-10-02 RX ADMIN — Medication 81 MILLIGRAM(S): at 12:20

## 2023-10-02 RX ADMIN — Medication 9 UNIT(S): at 17:57

## 2023-10-02 NOTE — PROGRESS NOTE ADULT - SUBJECTIVE AND OBJECTIVE BOX
[facility] Division of Hospital Medicine  Stacy Croft MD  Pager [phone]    Patient is a 52y old  Female who presents with a chief complaint of L eye vision loss       SUBJECTIVE / OVERNIGHT EVENTS: pt reports she is starting to get some tingling in her feet and would like to walk; she's never been in bed this long; offered that the RN will walk with her after she's given out the morning meds to the pts; ap apprec this; also spoke with pt that SW will talk to her about dc options      MEDICATIONS  (STANDING):  aspirin  chewable 81 milliGRAM(s) Oral daily  dextrose 5%. 1000 milliLiter(s) (100 mL/Hr) IV Continuous <Continuous>  dextrose 5%. 1000 milliLiter(s) (50 mL/Hr) IV Continuous <Continuous>  dextrose 50% Injectable 25 Gram(s) IV Push once  dextrose 50% Injectable 25 Gram(s) IV Push once  dextrose 50% Injectable 12.5 Gram(s) IV Push once  enoxaparin Injectable 40 milliGRAM(s) SubCutaneous every 24 hours  glucagon  Injectable 1 milliGRAM(s) IntraMuscular once  influenza   Vaccine 0.5 milliLiter(s) IntraMuscular once  insulin glargine Injectable (LANTUS) 14 Unit(s) SubCutaneous at bedtime  insulin lispro (ADMELOG) corrective regimen sliding scale   SubCutaneous three times a day before meals  insulin lispro (ADMELOG) corrective regimen sliding scale   SubCutaneous at bedtime  insulin lispro Injectable (ADMELOG) 9 Unit(s) SubCutaneous three times a day before meals    MEDICATIONS  (PRN):  acetaminophen     Tablet .. 650 milliGRAM(s) Oral every 6 hours PRN Temp greater or equal to 38C (100.4F), Mild Pain (1 - 3)  dextrose Oral Gel 15 Gram(s) Oral once PRN Blood Glucose LESS THAN 70 milliGRAM(s)/deciliter  melatonin 3 milliGRAM(s) Oral at bedtime PRN Insomnia      CAPILLARY BLOOD GLUCOSE  POCT Blood Glucose.: 143 mg/dL (02 Oct 2023 12:02)  POCT Blood Glucose.: 172 mg/dL (02 Oct 2023 09:34)  POCT Blood Glucose.: 119 mg/dL (02 Oct 2023 08:19)  POCT Blood Glucose.: 124 mg/dL (02 Oct 2023 00:56)  POCT Blood Glucose.: 117 mg/dL (01 Oct 2023 21:18)  POCT Blood Glucose.: 301 mg/dL (01 Oct 2023 17:15)  POCT Blood Glucose.: 189 mg/dL (01 Oct 2023 13:51)  POCT Blood Glucose.: 210 mg/dL (01 Oct 2023 12:15)    I&O's Summary      PHYSICAL EXAM:  Vital Signs Last 24 Hrs  T(F): 98.2 (02 Oct 2023 05:21), Max: 99.2 (01 Oct 2023 15:02)  HR: 81 (02 Oct 2023 05:21) (81 - 98)  BP: 124/68 (02 Oct 2023 05:21) (124/68 - 138/72)  RR: 15 (02 Oct 2023 05:21) (15 - 18)  SpO2: 98% (02 Oct 2023 05:21) (97% - 99%)    Parameters below as of 02 Oct 2023 05:21  Patient On (Oxygen Delivery Method): room air        CONSTITUTIONAL: NAD, appears comfortable  EYES: PERRLA; conjunctiva and sclera clear  ENMT: Moist oral mucosa; normal dentition  RESPIRATORY: Normal respiratory effort; lungs are clear to auscultation bilaterally  CARDIOVASCULAR: Regular rate and rhythm; No lower extremity edema;  ABDOMEN: Nontender to palpation, normoactive bowel sounds  MUSCULOSKELETAL:   no clubbing or cyanosis of digits; no joint swelling or tenderness to palpation  PSYCH: A+O to person, place, and time; affect appropriate  NEUROLOGY: CN 2-12 are intact and symmetric; no gross sensory deficits   SKIN: No rashes; no palpable lesions    LABS:

## 2023-10-03 LAB
GLUCOSE BLDC GLUCOMTR-MCNC: 128 MG/DL — HIGH (ref 70–99)
GLUCOSE BLDC GLUCOMTR-MCNC: 143 MG/DL — HIGH (ref 70–99)
GLUCOSE BLDC GLUCOMTR-MCNC: 150 MG/DL — HIGH (ref 70–99)
GLUCOSE BLDC GLUCOMTR-MCNC: 151 MG/DL — HIGH (ref 70–99)

## 2023-10-03 PROCEDURE — 99232 SBSQ HOSP IP/OBS MODERATE 35: CPT

## 2023-10-03 RX ORDER — SENNA PLUS 8.6 MG/1
2 TABLET ORAL AT BEDTIME
Refills: 0 | Status: DISCONTINUED | OUTPATIENT
Start: 2023-10-03 | End: 2023-10-07

## 2023-10-03 RX ORDER — POLYETHYLENE GLYCOL 3350 17 G/17G
17 POWDER, FOR SOLUTION ORAL
Refills: 0 | Status: DISCONTINUED | OUTPATIENT
Start: 2023-10-03 | End: 2023-10-07

## 2023-10-03 RX ADMIN — SENNA PLUS 2 TABLET(S): 8.6 TABLET ORAL at 23:38

## 2023-10-03 RX ADMIN — Medication 9 UNIT(S): at 12:26

## 2023-10-03 RX ADMIN — INSULIN GLARGINE 14 UNIT(S): 100 INJECTION, SOLUTION SUBCUTANEOUS at 22:19

## 2023-10-03 RX ADMIN — POLYETHYLENE GLYCOL 3350 17 GRAM(S): 17 POWDER, FOR SOLUTION ORAL at 23:40

## 2023-10-03 RX ADMIN — Medication 9 UNIT(S): at 17:59

## 2023-10-03 RX ADMIN — Medication 1: at 12:26

## 2023-10-03 RX ADMIN — ENOXAPARIN SODIUM 40 MILLIGRAM(S): 100 INJECTION SUBCUTANEOUS at 08:38

## 2023-10-03 RX ADMIN — Medication 9 UNIT(S): at 08:38

## 2023-10-03 NOTE — PROGRESS NOTE ADULT - SUBJECTIVE AND OBJECTIVE BOX
[facility] Division of Hospital Medicine  Stacy Croft MD  Pager [phone]    Patient is a 52y old  Female who presents with a chief complaint of L eye vision loss       SUBJECTIVE / OVERNIGHT EVENTS: waiting to hear back from  for dc planning; feet feel better after walking yesterday      MEDICATIONS  (STANDING):  aspirin  chewable 81 milliGRAM(s) Oral daily  dextrose 5%. 1000 milliLiter(s) (50 mL/Hr) IV Continuous <Continuous>  dextrose 5%. 1000 milliLiter(s) (100 mL/Hr) IV Continuous <Continuous>  dextrose 50% Injectable 25 Gram(s) IV Push once  dextrose 50% Injectable 25 Gram(s) IV Push once  dextrose 50% Injectable 12.5 Gram(s) IV Push once  enoxaparin Injectable 40 milliGRAM(s) SubCutaneous every 24 hours  glucagon  Injectable 1 milliGRAM(s) IntraMuscular once  influenza   Vaccine 0.5 milliLiter(s) IntraMuscular once  insulin glargine Injectable (LANTUS) 14 Unit(s) SubCutaneous at bedtime  insulin lispro (ADMELOG) corrective regimen sliding scale   SubCutaneous three times a day before meals  insulin lispro (ADMELOG) corrective regimen sliding scale   SubCutaneous at bedtime  insulin lispro Injectable (ADMELOG) 9 Unit(s) SubCutaneous three times a day before meals    MEDICATIONS  (PRN):  acetaminophen     Tablet .. 650 milliGRAM(s) Oral every 6 hours PRN Temp greater or equal to 38C (100.4F), Mild Pain (1 - 3)  dextrose Oral Gel 15 Gram(s) Oral once PRN Blood Glucose LESS THAN 70 milliGRAM(s)/deciliter  melatonin 3 milliGRAM(s) Oral at bedtime PRN Insomnia      CAPILLARY BLOOD GLUCOSE  POCT Blood Glucose.: 151 mg/dL (03 Oct 2023 12:01)  POCT Blood Glucose.: 143 mg/dL (03 Oct 2023 08:19)  POCT Blood Glucose.: 126 mg/dL (02 Oct 2023 21:04)  POCT Blood Glucose.: 128 mg/dL (02 Oct 2023 17:02)      PHYSICAL EXAM:  Vital Signs Last 24 Hrs  T(F): 98.5 (03 Oct 2023 05:02), Max: 98.5 (03 Oct 2023 05:02)  HR: 89 (03 Oct 2023 05:02) (86 - 90)  BP: 135/84 (03 Oct 2023 05:02) (135/84 - 137/83)  RR: 17 (03 Oct 2023 05:02) (17 - 18)  SpO2: 97% (03 Oct 2023 05:02) (97% - 99%)    Parameters below as of 03 Oct 2023 05:02  Patient On (Oxygen Delivery Method): room air        CONSTITUTIONAL: NAD, appears comfortable  EYES: PERRLA; conjunctiva and sclera clear  ENMT: Moist oral mucosa; normal dentition  RESPIRATORY: Normal respiratory effort; grossly b/l AE  CARDIOVASCULAR: Regular rate and rhythm; No lower extremity edema;  ABDOMEN: Nontender to palpation, normoactive bowel sounds  MUSCULOSKELETAL: no clubbing or cyanosis of digits; no joint swelling or tenderness to palpation  PSYCH: A+O to person, place, and time; affect appropriate  NEUROLOGY: visual acuity only to light and dark  SKIN: No rashes; no palpable lesions    LABS:

## 2023-10-03 NOTE — PROGRESS NOTE ADULT - ASSESSMENT
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 Endocrinology consulted for outpatient medication recommendations in the setting of the patient's complex social history.    #Diabetes Mellitus type 2  -On insulin in the past, not on insulin past 3 months, using friend's medication (sulfonylurea), homeless, eats one meal per day  - Outpatient, the patient was on 17 units basal insulin with 10 units of pre-meal insulin  - The patient has recently lost vision in both of her eyes (R eye 2022, L eye 2023) and is undomiciled  - Current A1C is 8.5% after three months off of insulin    #Inpatient  - Continue with Lantus 14 units at bedtime   - Continue Admelog at 9 units premeal  - Continue with low insulin corrective scale for meals and at bedtime  - FS before meals and at bedtime   - Consistent carb diet  - Hypoglycemia Protocol    #Outpatient  - The patient's A1C has improved since her previous hospitalization in 11/22 from 11.5% to 8.5%, the patient stopped her basal-bolus regimen three months ago and supplemented with what is presumed to be a sulfonylurea at an unknown dosage that she received from her friend  - The patient has transitioned to an intermittent fasting diet since she has entered the homeless shelter and has lost weight, those she is not certain of how much  - The patient's overall functional status and undomiciled status make it unlikely that she would be able to administer and store insulin on her own    - We would recommend the following oral regimen:  - Metformin ER 500mg x 2 tabs daily for one week with meal, increase to Metformin ER 500mg x 4 tabs daily with meal (usually eats one meal per day).  - Jardiance 10mg daily (covered by pts insurance)  - Please send prescriptions for diabetes supplies (glucometer, test strips, lancets, alcohol swabs, insulin pen needles).  *Prescribe Prodigy talking glucometer given patient is blind  - Patient has been approved for Anthony 3 glucose monitor, will request diabetes education for the patient to understand use of the monitor i/s/o her recent loss of vision - patient prefers not to use the Anthony and instead to use the talking glucometer above  - Routine outpatient opthalmology & podiatry evaluations recommended.  - The patient can follow-up with the [facility] Located at [address], [place] [address]. [phone]) or if straight medication can follow up at [facility] [address] [place] [address] vs Follow up at [facility] in the [place].  This should be determined based on insurance and where pt will reside.  She will require close monitoring of her diabetes following discharge.  -Please call your doctor if your fs is 70 or below and or 250 and above   -Please review importance of glucose monitoring, medication adherence, and importance of following a consistent carbohydrate diet   -Please review hypoglycemia and intervention  -SW for safe dc plan     #HLD  - Patient non-adherent to home dose statin, unwilling to initiate new therapy in-patient  - Goal LDL in diabetes mellitus is <70    #HTN  - Patient not currently on antihypertensive medication  - Goal BP in diabetes mellitus is <130/80  - Will continue to monitor    Endocrine to sign off. Please call with questions    D/w Samina Lomas  Nurse Practitioner  Division of Endocrinology & Diabetes  In house pager #[phone]    If before 9AM or after 6PM, or on weekends/holidays, please call endocrine answering service for assistance ([phone]).For nonurgent matters email [email or web address] for assistance.    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 Endocrinology consulted for outpatient medication recommendations in the setting of the patient's complex social history.    #Diabetes Mellitus type 2  -On insulin in the past, not on insulin past 3 months, using friend's medication (sulfonylurea), homeless, eats one meal per day  - Outpatient, the patient was on 17 units basal insulin with 10 units of pre-meal insulin  - The patient has recently lost vision in both of her eyes (R eye 2022, L eye 2023) and is undomiciled  - Current A1C is 8.5% after three months off of insulin    #Inpatient  - Continue with Lantus 14 units at bedtime   - Continue Admelog at 9 units premeal  - Continue with low insulin corrective scale for meals and at bedtime  - FS before meals and at bedtime   - Consistent carb diet  - Hypoglycemia Protocol    #Outpatient  - The patient's A1C has improved since her previous hospitalization in 11/22 from 11.5% to 8.5%, the patient stopped her basal-bolus regimen three months ago and supplemented with what is presumed to be a sulfonylurea at an unknown dosage that she received from her friend  - The patient has transitioned to an intermittent fasting diet since she has entered the homeless shelter and has lost weight, those she is not certain of how much  - The patient's overall functional status and undomiciled status make it unlikely that she would be able to administer and store insulin on her own    - We would recommend the following oral regimen:  - Metformin ER 500mg x 2 tabs daily for one week with meal, increase to Metformin ER 500mg x 4 tabs daily with meal (usually eats one meal per day).  - Jardiance 10mg daily (covered by pts insurance)  - Please send prescriptions for diabetes supplies (glucometer, test strips, lancets, alcohol swabs, insulin pen needles).  *Prescribe Prodigy talking glucometer given patient is blind  - Patient has been approved for Anthony 3 glucose monitor, will request diabetes education for the patient to understand use of the monitor i/s/o her recent loss of vision - patient prefers not to use the Anthony and instead to use the talking glucometer above  - Routine outpatient opthalmology & podiatry evaluations recommended.  - The patient can follow-up with the [facility] Located at [address], [place] [address]. [phone]) or if straight medication can follow up at [facility] [address] [place] [address] vs Follow up at [facility] in the [place].  This should be determined based on insurance and where pt will reside.  She will require close monitoring of her diabetes following discharge.  -Please call your doctor if your fs is 70 or below and or 250 and above   -Please review importance of glucose monitoring, medication adherence, and importance of following a consistent carbohydrate diet   -Please review hypoglycemia and intervention  -SW for safe dc plan     #HLD  - Patient non-adherent to home dose statin, unwilling to initiate new therapy in-patient  - Goal LDL in diabetes mellitus is <70    #HTN  - Patient not currently on antihypertensive medication  - Goal BP in diabetes mellitus is <130/80  - Will continue to monitor    Endocrine to sign off. Please call with questions. D/w Dr. Hoyt    D/w Samina Lomas  Nurse Practitioner  Division of Endocrinology & Diabetes  In house pager #[phone]    If before 9AM or after 6PM, or on weekends/holidays, please call endocrine answering service for assistance ([phone]).For nonurgent matters email [email or web address] for assistance.

## 2023-10-03 NOTE — PROGRESS NOTE ADULT - SUBJECTIVE AND OBJECTIVE BOX
Chief Complaint: Type 2 DM     History: Pt seen at bedside. Pt tolerating oral diet. Pt denies nausea and vomiting/any signs of hypoglycemia. Pt reports an adequate appetite.     MEDICATIONS  (STANDING):  aspirin  chewable 81 milliGRAM(s) Oral daily  dextrose 5%. 1000 milliLiter(s) (50 mL/Hr) IV Continuous <Continuous>  dextrose 5%. 1000 milliLiter(s) (100 mL/Hr) IV Continuous <Continuous>  dextrose 50% Injectable 12.5 Gram(s) IV Push once  dextrose 50% Injectable 25 Gram(s) IV Push once  dextrose 50% Injectable 25 Gram(s) IV Push once  enoxaparin Injectable 40 milliGRAM(s) SubCutaneous every 24 hours  glucagon  Injectable 1 milliGRAM(s) IntraMuscular once  influenza   Vaccine 0.5 milliLiter(s) IntraMuscular once  insulin glargine Injectable (LANTUS) 14 Unit(s) SubCutaneous at bedtime  insulin lispro (ADMELOG) corrective regimen sliding scale   SubCutaneous three times a day before meals  insulin lispro (ADMELOG) corrective regimen sliding scale   SubCutaneous at bedtime  insulin lispro Injectable (ADMELOG) 9 Unit(s) SubCutaneous three times a day before meals    MEDICATIONS  (PRN):  acetaminophen     Tablet .. 650 milliGRAM(s) Oral every 6 hours PRN Temp greater or equal to 38C (100.4F), Mild Pain (1 - 3)  dextrose Oral Gel 15 Gram(s) Oral once PRN Blood Glucose LESS THAN 70 milliGRAM(s)/deciliter  melatonin 3 milliGRAM(s) Oral at bedtime PRN Insomnia      Allergies: No Known Allergies    Review of Systems:  Respiratory: No SOB, no cough  GI: No nausea, vomiting, abdominal pain  Endocrine: no polyuria, polydipsia      PHYSICAL EXAM:  VITALS: T(C): 36.8 (10-03-23 @ 14:27)  T(F): 98.3 (10-03-23 @ 14:27), Max: 98.5 (10-03-23 @ 05:02)  HR: 84 (10-03-23 @ 14:27) (84 - 89)  BP: 147/70 (10-03-23 @ 14:27) (135/84 - 147/70)  RR:  (17 - 18)  SpO2:  (97% - 100%)  Wt(kg): --  GENERAL: NAD, well-groomed, well-developed  RESPIRATORY: No labored breathing   GI: Soft, nontender, non distended  PSYCH: Alert and oriented x 3, normal affect, normal mood      CAPILLARY BLOOD GLUCOSE  POCT Blood Glucose.: 128 mg/dL (03 Oct 2023 17:16)  POCT Blood Glucose.: 151 mg/dL (03 Oct 2023 12:01)  POCT Blood Glucose.: 143 mg/dL (03 Oct 2023 08:19)  POCT Blood Glucose.: 126 mg/dL (02 Oct 2023 21:04)    A1C with Estimated Average Glucose (09.22.23 @ 21:05)    A1C with Estimated Average Glucose Result: 8.5   Estimated Average Glucose: 197      Thyroid Function Tests:  09-23 @ 09:21 TSH 0.79 FreeT4 -- T3 -- Anti TPO -- Anti Thyroglobulin Ab -- TSI --    Diet, Consistent Carbohydrate w/Evening Snack (10-01-23 @ 10:11) [Active]

## 2023-10-04 LAB
ANION GAP SERPL CALC-SCNC: 11 MMOL/L — SIGNIFICANT CHANGE UP (ref 7–14)
BUN SERPL-MCNC: 18 MG/DL — SIGNIFICANT CHANGE UP (ref 7–23)
CALCIUM SERPL-MCNC: 9.2 MG/DL — SIGNIFICANT CHANGE UP (ref 8.4–10.5)
CHLORIDE SERPL-SCNC: 99 MMOL/L — SIGNIFICANT CHANGE UP (ref 98–107)
CO2 SERPL-SCNC: 29 MMOL/L — SIGNIFICANT CHANGE UP (ref 22–31)
CREAT SERPL-MCNC: 0.92 MG/DL — SIGNIFICANT CHANGE UP (ref 0.5–1.3)
EGFR: 75 ML/MIN/1.73M2 — SIGNIFICANT CHANGE UP
GLUCOSE BLDC GLUCOMTR-MCNC: 127 MG/DL — HIGH (ref 70–99)
GLUCOSE BLDC GLUCOMTR-MCNC: 127 MG/DL — HIGH (ref 70–99)
GLUCOSE BLDC GLUCOMTR-MCNC: 134 MG/DL — HIGH (ref 70–99)
GLUCOSE BLDC GLUCOMTR-MCNC: 155 MG/DL — HIGH (ref 70–99)
GLUCOSE SERPL-MCNC: 119 MG/DL — HIGH (ref 70–99)
HCT VFR BLD CALC: 33.6 % — LOW (ref 34.5–45)
HGB BLD-MCNC: 10.8 G/DL — LOW (ref 11.5–15.5)
MAGNESIUM SERPL-MCNC: 2.2 MG/DL — SIGNIFICANT CHANGE UP (ref 1.6–2.6)
MCHC RBC-ENTMCNC: 26.9 PG — LOW (ref 27–34)
MCHC RBC-ENTMCNC: 32.1 GM/DL — SIGNIFICANT CHANGE UP (ref 32–36)
MCV RBC AUTO: 83.8 FL — SIGNIFICANT CHANGE UP (ref 80–100)
NRBC # BLD: 0 /100 WBCS — SIGNIFICANT CHANGE UP (ref 0–0)
NRBC # FLD: 0 K/UL — SIGNIFICANT CHANGE UP (ref 0–0)
PHOSPHATE SERPL-MCNC: 3.7 MG/DL — SIGNIFICANT CHANGE UP (ref 2.5–4.5)
PLATELET # BLD AUTO: 210 K/UL — SIGNIFICANT CHANGE UP (ref 150–400)
POTASSIUM SERPL-MCNC: 4.3 MMOL/L — SIGNIFICANT CHANGE UP (ref 3.5–5.3)
POTASSIUM SERPL-SCNC: 4.3 MMOL/L — SIGNIFICANT CHANGE UP (ref 3.5–5.3)
RBC # BLD: 4.01 M/UL — SIGNIFICANT CHANGE UP (ref 3.8–5.2)
RBC # FLD: 14.1 % — SIGNIFICANT CHANGE UP (ref 10.3–14.5)
SODIUM SERPL-SCNC: 139 MMOL/L — SIGNIFICANT CHANGE UP (ref 135–145)
WBC # BLD: 6.15 K/UL — SIGNIFICANT CHANGE UP (ref 3.8–10.5)
WBC # FLD AUTO: 6.15 K/UL — SIGNIFICANT CHANGE UP (ref 3.8–10.5)

## 2023-10-04 PROCEDURE — 93010 ELECTROCARDIOGRAM REPORT: CPT

## 2023-10-04 PROCEDURE — 99232 SBSQ HOSP IP/OBS MODERATE 35: CPT

## 2023-10-04 RX ADMIN — Medication 9 UNIT(S): at 17:25

## 2023-10-04 RX ADMIN — INSULIN GLARGINE 14 UNIT(S): 100 INJECTION, SOLUTION SUBCUTANEOUS at 22:44

## 2023-10-04 RX ADMIN — Medication 81 MILLIGRAM(S): at 12:26

## 2023-10-04 RX ADMIN — POLYETHYLENE GLYCOL 3350 17 GRAM(S): 17 POWDER, FOR SOLUTION ORAL at 05:49

## 2023-10-04 RX ADMIN — ENOXAPARIN SODIUM 40 MILLIGRAM(S): 100 INJECTION SUBCUTANEOUS at 08:45

## 2023-10-04 RX ADMIN — Medication 1: at 17:24

## 2023-10-04 RX ADMIN — Medication 9 UNIT(S): at 08:43

## 2023-10-04 RX ADMIN — Medication 9 UNIT(S): at 12:25

## 2023-10-04 NOTE — PROGRESS NOTE ADULT - SUBJECTIVE AND OBJECTIVE BOX
[facility] Division of Hospital Medicine  Stacy Croft MD  Pager [phone]    Patient is a 52y old  Female who presents with a chief complaint of L eye vision loss       SUBJECTIVE / OVERNIGHT EVENTS: doing OK; hopeful for independent life, knows she needs assistance      MEDICATIONS  (STANDING):  aspirin  chewable 81 milliGRAM(s) Oral daily  dextrose 5%. 1000 milliLiter(s) (100 mL/Hr) IV Continuous <Continuous>  dextrose 5%. 1000 milliLiter(s) (50 mL/Hr) IV Continuous <Continuous>  dextrose 50% Injectable 25 Gram(s) IV Push once  dextrose 50% Injectable 12.5 Gram(s) IV Push once  dextrose 50% Injectable 25 Gram(s) IV Push once  enoxaparin Injectable 40 milliGRAM(s) SubCutaneous every 24 hours  glucagon  Injectable 1 milliGRAM(s) IntraMuscular once  influenza   Vaccine 0.5 milliLiter(s) IntraMuscular once  insulin glargine Injectable (LANTUS) 14 Unit(s) SubCutaneous at bedtime  insulin lispro (ADMELOG) corrective regimen sliding scale   SubCutaneous at bedtime  insulin lispro (ADMELOG) corrective regimen sliding scale   SubCutaneous three times a day before meals  insulin lispro Injectable (ADMELOG) 9 Unit(s) SubCutaneous three times a day before meals  polyethylene glycol 3350 17 Gram(s) Oral two times a day  senna 2 Tablet(s) Oral at bedtime    MEDICATIONS  (PRN):  acetaminophen     Tablet .. 650 milliGRAM(s) Oral every 6 hours PRN Temp greater or equal to 38C (100.4F), Mild Pain (1 - 3)  dextrose Oral Gel 15 Gram(s) Oral once PRN Blood Glucose LESS THAN 70 milliGRAM(s)/deciliter  melatonin 3 milliGRAM(s) Oral at bedtime PRN Insomnia      CAPILLARY BLOOD GLUCOSE  POCT Blood Glucose.: 127 mg/dL (04 Oct 2023 12:06)  POCT Blood Glucose.: 134 mg/dL (04 Oct 2023 08:28)  POCT Blood Glucose.: 150 mg/dL (03 Oct 2023 22:12)  POCT Blood Glucose.: 128 mg/dL (03 Oct 2023 17:16)    I&O's Summary      PHYSICAL EXAM:  Vital Signs Last 24 Hrs  T(F): 98.6 (04 Oct 2023 09:59), Max: 98.6 (04 Oct 2023 09:59)  HR: 100 (04 Oct 2023 09:59) (84 - 100)  BP: 104/63 (04 Oct 2023 09:59) (104/63 - 147/70)  RR: 18 (04 Oct 2023 09:59) (18 - 18)  SpO2: 100% (04 Oct 2023 09:59) (100% - 100%)    Parameters below as of 04 Oct 2023 09:59  Patient On (Oxygen Delivery Method): room air        CONSTITUTIONAL: NAD, appears comfortable  EYES: PERRLA; conjunctiva and sclera clear  ENMT: Moist oral mucosa; normal dentition  RESPIRATORY: Normal respiratory effort; grossly b/l AE  CARDIOVASCULAR: Regular rate and rhythm; No lower extremity edema;   ABDOMEN: Nontender to palpation, normoactive bowel sounds  MUSCULOSKELETAL:  no clubbing or cyanosis of digits; no joint swelling or tenderness to palpation  PSYCH: A+O to person, place, and time; affect appropriate  NEUROLOGY: near blindness  SKIN: No rashes; no palpable lesions    LABS:                        10.8   6.15  )-----------( 210      ( 04 Oct 2023 07:01 )             33.6     10-04    139  |  99  |  18  ----------------------------<  119<H>  4.3   |  29  |  0.92    Ca    9.2      04 Oct 2023 07:01  Phos  3.7     10-04  Mg     2.20     10-04

## 2023-10-05 LAB
ANION GAP SERPL CALC-SCNC: 9 MMOL/L — SIGNIFICANT CHANGE UP (ref 7–14)
BUN SERPL-MCNC: 19 MG/DL — SIGNIFICANT CHANGE UP (ref 7–23)
CALCIUM SERPL-MCNC: 9.2 MG/DL — SIGNIFICANT CHANGE UP (ref 8.4–10.5)
CHLORIDE SERPL-SCNC: 101 MMOL/L — SIGNIFICANT CHANGE UP (ref 98–107)
CO2 SERPL-SCNC: 28 MMOL/L — SIGNIFICANT CHANGE UP (ref 22–31)
CREAT SERPL-MCNC: 0.9 MG/DL — SIGNIFICANT CHANGE UP (ref 0.5–1.3)
EGFR: 77 ML/MIN/1.73M2 — SIGNIFICANT CHANGE UP
GLUCOSE BLDC GLUCOMTR-MCNC: 137 MG/DL — HIGH (ref 70–99)
GLUCOSE BLDC GLUCOMTR-MCNC: 152 MG/DL — HIGH (ref 70–99)
GLUCOSE BLDC GLUCOMTR-MCNC: 200 MG/DL — HIGH (ref 70–99)
GLUCOSE BLDC GLUCOMTR-MCNC: 200 MG/DL — HIGH (ref 70–99)
GLUCOSE SERPL-MCNC: 133 MG/DL — HIGH (ref 70–99)
HCT VFR BLD CALC: 35.1 % — SIGNIFICANT CHANGE UP (ref 34.5–45)
HGB BLD-MCNC: 11.3 G/DL — LOW (ref 11.5–15.5)
MAGNESIUM SERPL-MCNC: 2.2 MG/DL — SIGNIFICANT CHANGE UP (ref 1.6–2.6)
MCHC RBC-ENTMCNC: 27.2 PG — SIGNIFICANT CHANGE UP (ref 27–34)
MCHC RBC-ENTMCNC: 32.2 GM/DL — SIGNIFICANT CHANGE UP (ref 32–36)
MCV RBC AUTO: 84.4 FL — SIGNIFICANT CHANGE UP (ref 80–100)
NRBC # BLD: 0 /100 WBCS — SIGNIFICANT CHANGE UP (ref 0–0)
NRBC # FLD: 0 K/UL — SIGNIFICANT CHANGE UP (ref 0–0)
PHOSPHATE SERPL-MCNC: 3.8 MG/DL — SIGNIFICANT CHANGE UP (ref 2.5–4.5)
PLATELET # BLD AUTO: 219 K/UL — SIGNIFICANT CHANGE UP (ref 150–400)
POTASSIUM SERPL-MCNC: 4.3 MMOL/L — SIGNIFICANT CHANGE UP (ref 3.5–5.3)
POTASSIUM SERPL-SCNC: 4.3 MMOL/L — SIGNIFICANT CHANGE UP (ref 3.5–5.3)
RBC # BLD: 4.16 M/UL — SIGNIFICANT CHANGE UP (ref 3.8–5.2)
RBC # FLD: 13.9 % — SIGNIFICANT CHANGE UP (ref 10.3–14.5)
SODIUM SERPL-SCNC: 138 MMOL/L — SIGNIFICANT CHANGE UP (ref 135–145)
WBC # BLD: 6.79 K/UL — SIGNIFICANT CHANGE UP (ref 3.8–10.5)
WBC # FLD AUTO: 6.79 K/UL — SIGNIFICANT CHANGE UP (ref 3.8–10.5)

## 2023-10-05 PROCEDURE — 99231 SBSQ HOSP IP/OBS SF/LOW 25: CPT

## 2023-10-05 RX ORDER — LIDOCAINE 4 G/100G
1 CREAM TOPICAL ONCE
Refills: 0 | Status: COMPLETED | OUTPATIENT
Start: 2023-10-05 | End: 2023-10-07

## 2023-10-05 RX ORDER — ISOPROPYL ALCOHOL, BENZOCAINE .7; .06 ML/ML; ML/ML
0 SWAB TOPICAL
Qty: 100 | Refills: 1
Start: 2023-10-05

## 2023-10-05 RX ADMIN — Medication 81 MILLIGRAM(S): at 12:06

## 2023-10-05 RX ADMIN — SENNA PLUS 2 TABLET(S): 8.6 TABLET ORAL at 22:23

## 2023-10-05 RX ADMIN — Medication 1: at 17:15

## 2023-10-05 RX ADMIN — Medication 9 UNIT(S): at 17:15

## 2023-10-05 RX ADMIN — Medication 1: at 12:05

## 2023-10-05 RX ADMIN — INSULIN GLARGINE 14 UNIT(S): 100 INJECTION, SOLUTION SUBCUTANEOUS at 22:22

## 2023-10-05 RX ADMIN — Medication 9 UNIT(S): at 08:35

## 2023-10-05 RX ADMIN — Medication 9 UNIT(S): at 12:06

## 2023-10-05 NOTE — CHART NOTE - NSCHARTNOTEFT_GEN_A_CORE
pt seen this AM  walking with PCA nearby to go to the bathroom  she is without new complaints  attempting to assist pt navigating her new normal with blindness and coord safe dc  spoke yesterday with pharmacist who was attempting DM teaching  trying to simplify regimen as much as possible to allow pt to have independence as well as manage her medical needs  VSS  CHEST non labored  NEURO steady gait, blindness  a/w new vision loss with chronic underlying vision impairment  DM management  SW/CM heavily involved to coord care for safe, indep life with assistance she needs due to her new vision loss  pt is medically stable but no safe dc plan yet

## 2023-10-06 LAB
GLUCOSE BLDC GLUCOMTR-MCNC: 128 MG/DL — HIGH (ref 70–99)
GLUCOSE BLDC GLUCOMTR-MCNC: 129 MG/DL — HIGH (ref 70–99)
GLUCOSE BLDC GLUCOMTR-MCNC: 131 MG/DL — HIGH (ref 70–99)
GLUCOSE BLDC GLUCOMTR-MCNC: 144 MG/DL — HIGH (ref 70–99)

## 2023-10-06 PROCEDURE — 99232 SBSQ HOSP IP/OBS MODERATE 35: CPT

## 2023-10-06 RX ADMIN — Medication 9 UNIT(S): at 08:32

## 2023-10-06 RX ADMIN — Medication 9 UNIT(S): at 17:41

## 2023-10-06 RX ADMIN — Medication 81 MILLIGRAM(S): at 12:37

## 2023-10-06 RX ADMIN — Medication 9 UNIT(S): at 12:36

## 2023-10-06 RX ADMIN — SENNA PLUS 2 TABLET(S): 8.6 TABLET ORAL at 21:50

## 2023-10-06 RX ADMIN — INSULIN GLARGINE 14 UNIT(S): 100 INJECTION, SOLUTION SUBCUTANEOUS at 23:28

## 2023-10-06 NOTE — PHARMACOTHERAPY INTERVENTION NOTE - COMMENTS
Due to patient's new onset blindness, practiced with her own Prodigy talking meter. Patient was able to perform return demonstration with guidance from myself. Allowed her to do as much on her own but have verbal direction to assist. Patient was able to figure out which end of the strip goes into the meter but had some trouble finding the correct area to insert the strip (eventually able to figure it out). Patient had difficulty with the lancet and lancing device. She was able to remove the cap, first time she placed the lancet the needle bent and second time she forgot to remove the cap. After correction, she was able to insert the lancet into the lancing device. Then with my help was able to figure out which direction to turn to go to "1" and "10" on the lancing device - this part she will most likely need assistance with until it becomes muscle memory. She was able to successfully prick her finger and place the blood on the strip (I was concerned that she may not be able to place the blood on the strip but she was able to demonstrate this twice). Patient will benefit from continued teach back and practice, informed her to practice with RN or myself with supervision. Patient very motivated to perform this on her own. Informed her RN and ACP PA.   Due to patient's new onset blindness, practiced with her own Prodigy talking meter. Patient was able to perform return demonstration with guidance from myself. Allowed her to do as much on her own but have verbal direction to assist. Patient was able to figure out which end of the strip goes into the meter but had some trouble finding the correct area to insert the strip (eventually able to figure it out). Patient had difficulty with the lancet and lancing device. She was able to remove the cap, first time she placed the lancet the needle bent and second time she forgot to remove the cap. After correction, she was able to insert the lancet into the lancing device. Then with my help was able to figure out which direction to turn to go to "1" and "10" on the lancing device - this part she will most likely need assistance with until it becomes muscle memory. She was able to successfully prick her finger and place the blood on the strip (I was concerned that she may not be able to place the blood on the strip but she was able to demonstrate this twice). Patient will benefit from continued teach back and practice, informed her to practice with RN or myself with supervision. Patient very motivated to perform this on her own. Informed her RN and ACP PA.      10/9: Pt practiced with her own Prodigy meter with some assistance. On first attempt, pt placed strip on top of blood so the strip was unable to read the BG. She was able to troubleshoot this issue on her own, get new strip, and place blood on new strip. Pt needs assistance with the lancing device "strength" (to tell which number it is on 1-10).

## 2023-10-06 NOTE — PROGRESS NOTE ADULT - SUBJECTIVE AND OBJECTIVE BOX
[facility] Division of Hospital Medicine  Stacy Croft MD  Pager [phone]    Patient is a 52y old  Female who presents with a chief complaint of L eye vision loss      SUBJECTIVE / OVERNIGHT EVENTS: doing ok; very appreciative of the help we are giving to find her safe ways to remain independent as well as a safe living situation as she cannot return to her shelter      MEDICATIONS  (STANDING):  aspirin  chewable 81 milliGRAM(s) Oral daily  dextrose 5%. 1000 milliLiter(s) (100 mL/Hr) IV Continuous <Continuous>  dextrose 5%. 1000 milliLiter(s) (50 mL/Hr) IV Continuous <Continuous>  dextrose 50% Injectable 25 Gram(s) IV Push once  dextrose 50% Injectable 12.5 Gram(s) IV Push once  dextrose 50% Injectable 25 Gram(s) IV Push once  enoxaparin Injectable 40 milliGRAM(s) SubCutaneous every 24 hours  glucagon  Injectable 1 milliGRAM(s) IntraMuscular once  influenza   Vaccine 0.5 milliLiter(s) IntraMuscular once  insulin glargine Injectable (LANTUS) 14 Unit(s) SubCutaneous at bedtime  insulin lispro (ADMELOG) corrective regimen sliding scale   SubCutaneous at bedtime  insulin lispro (ADMELOG) corrective regimen sliding scale   SubCutaneous three times a day before meals  insulin lispro Injectable (ADMELOG) 9 Unit(s) SubCutaneous three times a day before meals  lidocaine   4% Patch 1 Patch Transdermal once  polyethylene glycol 3350 17 Gram(s) Oral two times a day  senna 2 Tablet(s) Oral at bedtime    MEDICATIONS  (PRN):  acetaminophen     Tablet .. 650 milliGRAM(s) Oral every 6 hours PRN Temp greater or equal to 38C (100.4F), Mild Pain (1 - 3)  dextrose Oral Gel 15 Gram(s) Oral once PRN Blood Glucose LESS THAN 70 milliGRAM(s)/deciliter  melatonin 3 milliGRAM(s) Oral at bedtime PRN Insomnia      CAPILLARY BLOOD GLUCOSE  POCT Blood Glucose.: 131 mg/dL (06 Oct 2023 12:05)  POCT Blood Glucose.: 144 mg/dL (06 Oct 2023 08:24)  POCT Blood Glucose.: 200 mg/dL (05 Oct 2023 22:17)  POCT Blood Glucose.: 200 mg/dL (05 Oct 2023 17:11)      PHYSICAL EXAM:  Vital Signs Last 24 Hrs  T(F): 97.7 (06 Oct 2023 05:45), Max: 98.3 (05 Oct 2023 22:05)  HR: 90 (06 Oct 2023 05:45) (90 - 91)  BP: 140/72 (06 Oct 2023 05:45) (140/72 - 140/72)  RR: 18 (06 Oct 2023 05:45) (18 - 18)  SpO2: 100% (06 Oct 2023 05:45) (99% - 100%)    Parameters below as of 06 Oct 2023 05:45  Patient On (Oxygen Delivery Method): room air        CONSTITUTIONAL: NAD, appears comfortable  EYES: PERRLA; conjunctiva and sclera clear  ENMT: Moist oral mucosa; normal dentition  RESPIRATORY: Normal respiratory effort; grossly b/l AE  CARDIOVASCULAR: Regular rate and rhythm; No lower extremity edema  ABDOMEN: Nontender to palpation, normoactive bowel sounds  MUSCULOSKELETAL:  no clubbing or cyanosis of digits; no joint swelling or tenderness to palpation  PSYCH: A+O to person, place, and time; affect appropriate  NEUROLOGY: CN 3-12 are intact and symmetric; blind  SKIN: No rashes; no palpable lesions    LABS:                        11.3   6.79  )-----------( 219      ( 05 Oct 2023 08:33 )             35.1     10-05    138  |  101  |  19  ----------------------------<  133<H>  4.3   |  28  |  0.90    Ca    9.2      05 Oct 2023 08:33  Phos  3.8     10-05  Mg     2.20     10-05

## 2023-10-07 LAB
GLUCOSE BLDC GLUCOMTR-MCNC: 115 MG/DL — HIGH (ref 70–99)
GLUCOSE BLDC GLUCOMTR-MCNC: 123 MG/DL — HIGH (ref 70–99)
GLUCOSE BLDC GLUCOMTR-MCNC: 141 MG/DL — HIGH (ref 70–99)
GLUCOSE BLDC GLUCOMTR-MCNC: 98 MG/DL — SIGNIFICANT CHANGE UP (ref 70–99)

## 2023-10-07 PROCEDURE — 99231 SBSQ HOSP IP/OBS SF/LOW 25: CPT

## 2023-10-07 RX ORDER — LACTULOSE 10 G/15ML
10 SOLUTION ORAL
Refills: 0 | Status: DISCONTINUED | OUTPATIENT
Start: 2023-10-07 | End: 2023-10-26

## 2023-10-07 RX ADMIN — LIDOCAINE 1 PATCH: 4 CREAM TOPICAL at 12:32

## 2023-10-07 RX ADMIN — Medication 81 MILLIGRAM(S): at 12:31

## 2023-10-07 RX ADMIN — Medication 9 UNIT(S): at 08:46

## 2023-10-07 RX ADMIN — Medication 9 UNIT(S): at 17:42

## 2023-10-07 RX ADMIN — LACTULOSE 10 GRAM(S): 10 SOLUTION ORAL at 23:46

## 2023-10-07 RX ADMIN — Medication 9 UNIT(S): at 12:32

## 2023-10-07 RX ADMIN — INSULIN GLARGINE 14 UNIT(S): 100 INJECTION, SOLUTION SUBCUTANEOUS at 22:27

## 2023-10-07 RX ADMIN — Medication 5 MILLIGRAM(S): at 22:32

## 2023-10-07 RX ADMIN — LIDOCAINE 1 PATCH: 4 CREAM TOPICAL at 22:06

## 2023-10-07 NOTE — PROGRESS NOTE ADULT - SUBJECTIVE AND OBJECTIVE BOX
Mary Michelle MD   [employer], Pager [phone]    INTERVAL HPI/OVERNIGHT EVENTS:  Patient was seen and examined. Complaining of constipation. ROS otherwise negative.     VITAL SIGNS:  T(F): 99.1 (10-07-23 @ 12:59)  HR: 98 (10-07-23 @ 12:59)  BP: 138/78 (10-07-23 @ 12:59)  RR: 19 (10-07-23 @ 12:59)  SpO2: 100% (10-07-23 @ 12:59)  Wt(kg): --    PHYSICAL EXAM:  CONSTITUTIONAL: NAD, appears comfortable  EYES: PERRLA; conjunctiva and sclera clear  ENMT: Moist oral mucosa; normal dentition  RESPIRATORY: Normal respiratory effort; grossly b/l AE  CARDIOVASCULAR: Regular rate and rhythm; No lower extremity edema  ABDOMEN: Nontender to palpation, normoactive bowel sounds  MUSCULOSKELETAL:  no clubbing or cyanosis of digits; no joint swelling or tenderness to palpation  PSYCH: A+O to person, place, and time; affect appropriate  NEUROLOGY: CN 3-12 are intact and symmetric; blind  SKIN: No rashes; no palpable lesions    MEDICATIONS  (STANDING):  aspirin  chewable 81 milliGRAM(s) Oral daily  dextrose 5%. 1000 milliLiter(s) (50 mL/Hr) IV Continuous <Continuous>  dextrose 5%. 1000 milliLiter(s) (100 mL/Hr) IV Continuous <Continuous>  dextrose 50% Injectable 12.5 Gram(s) IV Push once  dextrose 50% Injectable 25 Gram(s) IV Push once  dextrose 50% Injectable 25 Gram(s) IV Push once  enoxaparin Injectable 40 milliGRAM(s) SubCutaneous every 24 hours  glucagon  Injectable 1 milliGRAM(s) IntraMuscular once  influenza   Vaccine 0.5 milliLiter(s) IntraMuscular once  insulin glargine Injectable (LANTUS) 14 Unit(s) SubCutaneous at bedtime  insulin lispro (ADMELOG) corrective regimen sliding scale   SubCutaneous three times a day before meals  insulin lispro (ADMELOG) corrective regimen sliding scale   SubCutaneous at bedtime  insulin lispro Injectable (ADMELOG) 9 Unit(s) SubCutaneous three times a day before meals  polyethylene glycol 3350 17 Gram(s) Oral two times a day  senna 2 Tablet(s) Oral at bedtime    MEDICATIONS  (PRN):  acetaminophen     Tablet .. 650 milliGRAM(s) Oral every 6 hours PRN Temp greater or equal to 38C (100.4F), Mild Pain (1 - 3)  dextrose Oral Gel 15 Gram(s) Oral once PRN Blood Glucose LESS THAN 70 milliGRAM(s)/deciliter  melatonin 3 milliGRAM(s) Oral at bedtime PRN Insomnia      Allergies    No Known Allergies    Intolerances        LABS:                RADIOLOGY & ADDITIONAL TESTS:  Reviewed

## 2023-10-08 DIAGNOSIS — M25.512 PAIN IN LEFT SHOULDER: ICD-10-CM

## 2023-10-08 DIAGNOSIS — K59.00 CONSTIPATION, UNSPECIFIED: ICD-10-CM

## 2023-10-08 LAB
ANION GAP SERPL CALC-SCNC: 11 MMOL/L — SIGNIFICANT CHANGE UP (ref 7–14)
BUN SERPL-MCNC: 22 MG/DL — SIGNIFICANT CHANGE UP (ref 7–23)
CALCIUM SERPL-MCNC: 9.6 MG/DL — SIGNIFICANT CHANGE UP (ref 8.4–10.5)
CHLORIDE SERPL-SCNC: 102 MMOL/L — SIGNIFICANT CHANGE UP (ref 98–107)
CO2 SERPL-SCNC: 26 MMOL/L — SIGNIFICANT CHANGE UP (ref 22–31)
CREAT SERPL-MCNC: 0.94 MG/DL — SIGNIFICANT CHANGE UP (ref 0.5–1.3)
EGFR: 73 ML/MIN/1.73M2 — SIGNIFICANT CHANGE UP
GLUCOSE BLDC GLUCOMTR-MCNC: 138 MG/DL — HIGH (ref 70–99)
GLUCOSE BLDC GLUCOMTR-MCNC: 138 MG/DL — HIGH (ref 70–99)
GLUCOSE BLDC GLUCOMTR-MCNC: 142 MG/DL — HIGH (ref 70–99)
GLUCOSE BLDC GLUCOMTR-MCNC: 206 MG/DL — HIGH (ref 70–99)
GLUCOSE SERPL-MCNC: 120 MG/DL — HIGH (ref 70–99)
HCT VFR BLD CALC: 35.1 % — SIGNIFICANT CHANGE UP (ref 34.5–45)
HGB BLD-MCNC: 11.2 G/DL — LOW (ref 11.5–15.5)
MAGNESIUM SERPL-MCNC: 2.3 MG/DL — SIGNIFICANT CHANGE UP (ref 1.6–2.6)
MCHC RBC-ENTMCNC: 26.9 PG — LOW (ref 27–34)
MCHC RBC-ENTMCNC: 31.9 GM/DL — LOW (ref 32–36)
MCV RBC AUTO: 84.2 FL — SIGNIFICANT CHANGE UP (ref 80–100)
NRBC # BLD: 0 /100 WBCS — SIGNIFICANT CHANGE UP (ref 0–0)
NRBC # FLD: 0 K/UL — SIGNIFICANT CHANGE UP (ref 0–0)
PHOSPHATE SERPL-MCNC: 4.1 MG/DL — SIGNIFICANT CHANGE UP (ref 2.5–4.5)
PLATELET # BLD AUTO: 225 K/UL — SIGNIFICANT CHANGE UP (ref 150–400)
POTASSIUM SERPL-MCNC: 4.5 MMOL/L — SIGNIFICANT CHANGE UP (ref 3.5–5.3)
POTASSIUM SERPL-SCNC: 4.5 MMOL/L — SIGNIFICANT CHANGE UP (ref 3.5–5.3)
RBC # BLD: 4.17 M/UL — SIGNIFICANT CHANGE UP (ref 3.8–5.2)
RBC # FLD: 13.8 % — SIGNIFICANT CHANGE UP (ref 10.3–14.5)
SODIUM SERPL-SCNC: 139 MMOL/L — SIGNIFICANT CHANGE UP (ref 135–145)
WBC # BLD: 6.19 K/UL — SIGNIFICANT CHANGE UP (ref 3.8–10.5)
WBC # FLD AUTO: 6.19 K/UL — SIGNIFICANT CHANGE UP (ref 3.8–10.5)

## 2023-10-08 PROCEDURE — 99232 SBSQ HOSP IP/OBS MODERATE 35: CPT

## 2023-10-08 RX ORDER — CYCLOBENZAPRINE HYDROCHLORIDE 10 MG/1
5 TABLET, FILM COATED ORAL ONCE
Refills: 0 | Status: COMPLETED | OUTPATIENT
Start: 2023-10-08 | End: 2023-10-08

## 2023-10-08 RX ORDER — LIDOCAINE 4 G/100G
1 CREAM TOPICAL DAILY
Refills: 0 | Status: DISCONTINUED | OUTPATIENT
Start: 2023-10-08 | End: 2023-10-26

## 2023-10-08 RX ORDER — CYCLOBENZAPRINE HYDROCHLORIDE 10 MG/1
5 TABLET, FILM COATED ORAL THREE TIMES A DAY
Refills: 0 | Status: DISCONTINUED | OUTPATIENT
Start: 2023-10-08 | End: 2023-10-26

## 2023-10-08 RX ADMIN — Medication 9 UNIT(S): at 18:01

## 2023-10-08 RX ADMIN — Medication 81 MILLIGRAM(S): at 10:08

## 2023-10-08 RX ADMIN — INSULIN GLARGINE 14 UNIT(S): 100 INJECTION, SOLUTION SUBCUTANEOUS at 21:50

## 2023-10-08 RX ADMIN — Medication 9 UNIT(S): at 08:59

## 2023-10-08 RX ADMIN — Medication 2: at 13:02

## 2023-10-08 RX ADMIN — LIDOCAINE 1 PATCH: 4 CREAM TOPICAL at 02:51

## 2023-10-08 RX ADMIN — Medication 9 UNIT(S): at 13:02

## 2023-10-08 RX ADMIN — CYCLOBENZAPRINE HYDROCHLORIDE 5 MILLIGRAM(S): 10 TABLET, FILM COATED ORAL at 11:27

## 2023-10-08 NOTE — PROGRESS NOTE ADULT - SUBJECTIVE AND OBJECTIVE BOX
Mary Michelle MD   [employer], Pager [phone]    INTERVAL HPI/OVERNIGHT EVENTS:  Patient was seen and examined at bedside. Patient complaining of continued constipation. Patient also complaining of left shoulder and neck spasms and pain. ROS otherwise negative.     VITAL SIGNS:  T(F): 98.2 (10-08-23 @ 06:01)  HR: 91 (10-08-23 @ 06:01)  BP: 124/79 (10-08-23 @ 06:01)  RR: 18 (10-08-23 @ 06:01)  SpO2: 100% (10-08-23 @ 06:01)  Wt(kg): --    PHYSICAL EXAM:  CONSTITUTIONAL: NAD, appears comfortable  EYES: conjunctiva and sclera clear  ENMT: Moist oral mucosa; normal dentition  RESPIRATORY: Normal respiratory effort; grossly b/l AE  CARDIOVASCULAR: Regular rate and rhythm; No lower extremity edema  ABDOMEN: Nontender to palpation, normoactive bowel sounds  MUSCULOSKELETAL: pain to palpation of left paraspinal neck. ROM of neck preserved. No spinal tenderness. Patient w/ pain w/ abduction and flexion at shoulder, ROM however preserved.   PSYCH: A+O to person, place, and time; affect appropriate  NEUROLOGY: CN 3-12 are intact and symmetric; blind  SKIN: No rashes; no palpable lesions    MEDICATIONS  (STANDING):  aspirin  chewable 81 milliGRAM(s) Oral daily  bisacodyl 5 milliGRAM(s) Oral every 12 hours  bisacodyl Suppository 10 milliGRAM(s) Rectal at bedtime  dextrose 5%. 1000 milliLiter(s) (100 mL/Hr) IV Continuous <Continuous>  dextrose 5%. 1000 milliLiter(s) (50 mL/Hr) IV Continuous <Continuous>  dextrose 50% Injectable 25 Gram(s) IV Push once  dextrose 50% Injectable 12.5 Gram(s) IV Push once  dextrose 50% Injectable 25 Gram(s) IV Push once  enoxaparin Injectable 40 milliGRAM(s) SubCutaneous every 24 hours  glucagon  Injectable 1 milliGRAM(s) IntraMuscular once  influenza   Vaccine 0.5 milliLiter(s) IntraMuscular once  insulin glargine Injectable (LANTUS) 14 Unit(s) SubCutaneous at bedtime  insulin lispro (ADMELOG) corrective regimen sliding scale   SubCutaneous three times a day before meals  insulin lispro (ADMELOG) corrective regimen sliding scale   SubCutaneous at bedtime  insulin lispro Injectable (ADMELOG) 9 Unit(s) SubCutaneous three times a day before meals  lactulose Syrup 10 Gram(s) Oral two times a day    MEDICATIONS  (PRN):  acetaminophen     Tablet .. 650 milliGRAM(s) Oral every 6 hours PRN Temp greater or equal to 38C (100.4F), Mild Pain (1 - 3)  dextrose Oral Gel 15 Gram(s) Oral once PRN Blood Glucose LESS THAN 70 milliGRAM(s)/deciliter  melatonin 3 milliGRAM(s) Oral at bedtime PRN Insomnia      Allergies    No Known Allergies    Intolerances        LABS:                        11.2   6.19  )-----------( 225      ( 08 Oct 2023 07:30 )             35.1     10-08    139  |  102  |  22  ----------------------------<  120<H>  4.5   |  26  |  0.94    Ca    9.6      08 Oct 2023 07:30  Phos  4.1     10-08  Mg     2.30     10-08        Urinalysis Basic - ( 08 Oct 2023 07:30 )    Color: x / Appearance: x / SG: x / pH: x  Gluc: 120 mg/dL / Ketone: x  / Bili: x / Urobili: x   Blood: x / Protein: x / Nitrite: x   Leuk Esterase: x / RBC: x / WBC x   Sq Epi: x / Non Sq Epi: x / Bacteria: x        RADIOLOGY & ADDITIONAL TESTS:  Reviewed

## 2023-10-08 NOTE — PROGRESS NOTE ADULT - NS ATTEST RISK PROBLEM GEN_ALL_CORE FT
Patient w/ continued constipation, added on dulcolax suppository. Will monitor for BM.   Patient w/ new left shoulder pain, likely muscular. Cyclobenzaprine and lidocaine patch ordered.
Patient awaiting placement, patient newly blind. Patient complaining of constipation adjusted bowel regimen.

## 2023-10-09 LAB
ANION GAP SERPL CALC-SCNC: 13 MMOL/L — SIGNIFICANT CHANGE UP (ref 7–14)
BUN SERPL-MCNC: 25 MG/DL — HIGH (ref 7–23)
CALCIUM SERPL-MCNC: 9.3 MG/DL — SIGNIFICANT CHANGE UP (ref 8.4–10.5)
CHLORIDE SERPL-SCNC: 100 MMOL/L — SIGNIFICANT CHANGE UP (ref 98–107)
CO2 SERPL-SCNC: 25 MMOL/L — SIGNIFICANT CHANGE UP (ref 22–31)
CREAT SERPL-MCNC: 0.98 MG/DL — SIGNIFICANT CHANGE UP (ref 0.5–1.3)
EGFR: 69 ML/MIN/1.73M2 — SIGNIFICANT CHANGE UP
GLUCOSE BLDC GLUCOMTR-MCNC: 126 MG/DL — HIGH (ref 70–99)
GLUCOSE BLDC GLUCOMTR-MCNC: 130 MG/DL — HIGH (ref 70–99)
GLUCOSE BLDC GLUCOMTR-MCNC: 144 MG/DL — HIGH (ref 70–99)
GLUCOSE BLDC GLUCOMTR-MCNC: 83 MG/DL — SIGNIFICANT CHANGE UP (ref 70–99)
GLUCOSE SERPL-MCNC: 137 MG/DL — HIGH (ref 70–99)
HCT VFR BLD CALC: 34.8 % — SIGNIFICANT CHANGE UP (ref 34.5–45)
HGB BLD-MCNC: 10.9 G/DL — LOW (ref 11.5–15.5)
MAGNESIUM SERPL-MCNC: 2.2 MG/DL — SIGNIFICANT CHANGE UP (ref 1.6–2.6)
MCHC RBC-ENTMCNC: 26.5 PG — LOW (ref 27–34)
MCHC RBC-ENTMCNC: 31.3 GM/DL — LOW (ref 32–36)
MCV RBC AUTO: 84.5 FL — SIGNIFICANT CHANGE UP (ref 80–100)
NRBC # BLD: 0 /100 WBCS — SIGNIFICANT CHANGE UP (ref 0–0)
NRBC # FLD: 0 K/UL — SIGNIFICANT CHANGE UP (ref 0–0)
PHOSPHATE SERPL-MCNC: 3.8 MG/DL — SIGNIFICANT CHANGE UP (ref 2.5–4.5)
PLATELET # BLD AUTO: 224 K/UL — SIGNIFICANT CHANGE UP (ref 150–400)
POTASSIUM SERPL-MCNC: 4.4 MMOL/L — SIGNIFICANT CHANGE UP (ref 3.5–5.3)
POTASSIUM SERPL-SCNC: 4.4 MMOL/L — SIGNIFICANT CHANGE UP (ref 3.5–5.3)
RBC # BLD: 4.12 M/UL — SIGNIFICANT CHANGE UP (ref 3.8–5.2)
RBC # FLD: 13.8 % — SIGNIFICANT CHANGE UP (ref 10.3–14.5)
SODIUM SERPL-SCNC: 138 MMOL/L — SIGNIFICANT CHANGE UP (ref 135–145)
WBC # BLD: 5.95 K/UL — SIGNIFICANT CHANGE UP (ref 3.8–10.5)
WBC # FLD AUTO: 5.95 K/UL — SIGNIFICANT CHANGE UP (ref 3.8–10.5)

## 2023-10-09 PROCEDURE — 99232 SBSQ HOSP IP/OBS MODERATE 35: CPT

## 2023-10-09 RX ORDER — INSULIN LISPRO 100/ML
4 VIAL (ML) SUBCUTANEOUS ONCE
Refills: 0 | Status: COMPLETED | OUTPATIENT
Start: 2023-10-09 | End: 2023-10-09

## 2023-10-09 RX ORDER — SENNA PLUS 8.6 MG/1
2 TABLET ORAL AT BEDTIME
Refills: 0 | Status: DISCONTINUED | OUTPATIENT
Start: 2023-10-09 | End: 2023-10-26

## 2023-10-09 RX ADMIN — Medication 4 UNIT(S): at 18:28

## 2023-10-09 RX ADMIN — Medication 9 UNIT(S): at 08:35

## 2023-10-09 RX ADMIN — Medication 9 UNIT(S): at 12:32

## 2023-10-09 RX ADMIN — Medication 81 MILLIGRAM(S): at 08:54

## 2023-10-09 RX ADMIN — INSULIN GLARGINE 14 UNIT(S): 100 INJECTION, SOLUTION SUBCUTANEOUS at 22:21

## 2023-10-09 NOTE — PROGRESS NOTE ADULT - SUBJECTIVE AND OBJECTIVE BOX
[facility] Division of Hospital Medicine  Stacy Croft MD  Pager [phone]    Patient is a 52y old  Female who presents with a chief complaint of L eye vision loss      SUBJECTIVE / OVERNIGHT EVENTS: upset about situation but realizes she doesn't really have much option      MEDICATIONS  (STANDING):  aspirin  chewable 81 milliGRAM(s) Oral daily  bisacodyl 5 milliGRAM(s) Oral every 12 hours  bisacodyl Suppository 10 milliGRAM(s) Rectal at bedtime  dextrose 5%. 1000 milliLiter(s) (100 mL/Hr) IV Continuous <Continuous>  dextrose 5%. 1000 milliLiter(s) (50 mL/Hr) IV Continuous <Continuous>  dextrose 50% Injectable 25 Gram(s) IV Push once  dextrose 50% Injectable 12.5 Gram(s) IV Push once  dextrose 50% Injectable 25 Gram(s) IV Push once  enoxaparin Injectable 40 milliGRAM(s) SubCutaneous every 24 hours  glucagon  Injectable 1 milliGRAM(s) IntraMuscular once  influenza   Vaccine 0.5 milliLiter(s) IntraMuscular once  insulin glargine Injectable (LANTUS) 14 Unit(s) SubCutaneous at bedtime  insulin lispro (ADMELOG) corrective regimen sliding scale   SubCutaneous three times a day before meals  insulin lispro (ADMELOG) corrective regimen sliding scale   SubCutaneous at bedtime  insulin lispro Injectable (ADMELOG) 9 Unit(s) SubCutaneous three times a day before meals  lactulose Syrup 10 Gram(s) Oral two times a day  lidocaine   4% Patch 1 Patch Transdermal daily    MEDICATIONS  (PRN):  acetaminophen     Tablet .. 650 milliGRAM(s) Oral every 6 hours PRN Temp greater or equal to 38C (100.4F), Mild Pain (1 - 3)  cyclobenzaprine 5 milliGRAM(s) Oral three times a day PRN Muscle Spasm  dextrose Oral Gel 15 Gram(s) Oral once PRN Blood Glucose LESS THAN 70 milliGRAM(s)/deciliter  melatonin 3 milliGRAM(s) Oral at bedtime PRN Insomnia      CAPILLARY BLOOD GLUCOSE  POCT Blood Glucose.: 126 mg/dL (09 Oct 2023 12:03)  POCT Blood Glucose.: 130 mg/dL (09 Oct 2023 08:25)  POCT Blood Glucose.: 138 mg/dL (08 Oct 2023 21:38)  POCT Blood Glucose.: 138 mg/dL (08 Oct 2023 17:18)    PHYSICAL EXAM:  Vital Signs Last 24 Hrs  T(F): 98.2 (09 Oct 2023 12:43), Max: 98.4 (09 Oct 2023 05:30)  HR: 91 (09 Oct 2023 12:43) (89 - 93)  BP: 124/73 (09 Oct 2023 12:43) (124/73 - 133/71)  RR: 18 (09 Oct 2023 12:43) (17 - 18)  SpO2: 100% (09 Oct 2023 12:43) (98% - 100%)    Parameters below as of 09 Oct 2023 05:30  Patient On (Oxygen Delivery Method): room air        CONSTITUTIONAL: NAD, well-developed, well-groomed  EYES: PERRLA; conjunctiva and sclera clear  ENMT: Moist oral mucosa; normal dentition    RESPIRATORY: Normal respiratory effort; lungs are clear to auscultation bilaterally  CARDIOVASCULAR: Regular rate and rhythm; No lower extremity edema  ABDOMEN: Nontender to palpation, normoactive bowel sounds  MUSCULOSKELETAL:  no clubbing or cyanosis of digits; no joint swelling or tenderness to palpation  PSYCH: A+O to person, place, and time; affect appropriate  NEUROLOGY: CN 3-12 are intact and symmetric; no gross sensory deficits   SKIN: No rashes; no palpable lesions    LABS:                        10.9   5.95  )-----------( 224      ( 09 Oct 2023 06:04 )             34.8     10-09    138  |  100  |  25<H>  ----------------------------<  137<H>  4.4   |  25  |  0.98    Ca    9.3      09 Oct 2023 06:04  Phos  3.8     10-09  Mg     2.20     10-09

## 2023-10-10 LAB
GLUCOSE BLDC GLUCOMTR-MCNC: 114 MG/DL — HIGH (ref 70–99)
GLUCOSE BLDC GLUCOMTR-MCNC: 136 MG/DL — HIGH (ref 70–99)
GLUCOSE BLDC GLUCOMTR-MCNC: 178 MG/DL — HIGH (ref 70–99)
GLUCOSE BLDC GLUCOMTR-MCNC: 178 MG/DL — HIGH (ref 70–99)
GLUCOSE BLDC GLUCOMTR-MCNC: 260 MG/DL — HIGH (ref 70–99)

## 2023-10-10 PROCEDURE — 99231 SBSQ HOSP IP/OBS SF/LOW 25: CPT

## 2023-10-10 RX ADMIN — Medication 3: at 12:34

## 2023-10-10 RX ADMIN — Medication 81 MILLIGRAM(S): at 12:35

## 2023-10-10 RX ADMIN — Medication 9 UNIT(S): at 17:51

## 2023-10-10 RX ADMIN — Medication 9 UNIT(S): at 12:35

## 2023-10-10 NOTE — CHART NOTE - NSCHARTNOTEFT_GEN_A_CORE
pt is medically stable  SW/CM working to coord care for outpt given pt newly blind and cannot return to her shelter  pt received white walking cane, await PT/OT training  pharmacy is working with pt to learn finger sticks with her now near complete blindness  applications submitted to housing for visually impaired  VSS  CHEST non labored  NEURO blind, steady gait    now on isolation due to covid exposure  no sx  monitor per protocol

## 2023-10-11 LAB
GLUCOSE BLDC GLUCOMTR-MCNC: 121 MG/DL — HIGH (ref 70–99)
GLUCOSE BLDC GLUCOMTR-MCNC: 127 MG/DL — HIGH (ref 70–99)
GLUCOSE BLDC GLUCOMTR-MCNC: 127 MG/DL — HIGH (ref 70–99)
GLUCOSE BLDC GLUCOMTR-MCNC: 241 MG/DL — HIGH (ref 70–99)
SARS-COV-2 RNA SPEC QL NAA+PROBE: SIGNIFICANT CHANGE UP

## 2023-10-11 PROCEDURE — 99231 SBSQ HOSP IP/OBS SF/LOW 25: CPT

## 2023-10-11 RX ADMIN — Medication 9 UNIT(S): at 09:52

## 2023-10-11 RX ADMIN — Medication 2: at 17:38

## 2023-10-11 RX ADMIN — INSULIN GLARGINE 14 UNIT(S): 100 INJECTION, SOLUTION SUBCUTANEOUS at 23:18

## 2023-10-11 RX ADMIN — Medication 9 UNIT(S): at 17:39

## 2023-10-11 RX ADMIN — ENOXAPARIN SODIUM 40 MILLIGRAM(S): 100 INJECTION SUBCUTANEOUS at 12:32

## 2023-10-11 RX ADMIN — Medication 81 MILLIGRAM(S): at 12:32

## 2023-10-11 RX ADMIN — Medication 5 MILLIGRAM(S): at 16:51

## 2023-10-11 NOTE — CHART NOTE - NSCHARTNOTEFT_GEN_A_CORE
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

## 2023-10-12 LAB
GLUCOSE BLDC GLUCOMTR-MCNC: 114 MG/DL — HIGH (ref 70–99)
GLUCOSE BLDC GLUCOMTR-MCNC: 134 MG/DL — HIGH (ref 70–99)
GLUCOSE BLDC GLUCOMTR-MCNC: 134 MG/DL — HIGH (ref 70–99)
GLUCOSE BLDC GLUCOMTR-MCNC: 142 MG/DL — HIGH (ref 70–99)
GLUCOSE BLDC GLUCOMTR-MCNC: 156 MG/DL — HIGH (ref 70–99)
GLUCOSE BLDC GLUCOMTR-MCNC: 166 MG/DL — HIGH (ref 70–99)

## 2023-10-12 PROCEDURE — 99231 SBSQ HOSP IP/OBS SF/LOW 25: CPT

## 2023-10-12 RX ORDER — ACETAMINOPHEN 500 MG
1000 TABLET ORAL ONCE
Refills: 0 | Status: DISCONTINUED | OUTPATIENT
Start: 2023-10-12 | End: 2023-10-12

## 2023-10-12 RX ADMIN — Medication 650 MILLIGRAM(S): at 21:00

## 2023-10-12 RX ADMIN — LIDOCAINE 1 PATCH: 4 CREAM TOPICAL at 20:28

## 2023-10-12 RX ADMIN — Medication 1: at 17:42

## 2023-10-12 RX ADMIN — Medication 9 UNIT(S): at 08:42

## 2023-10-12 RX ADMIN — Medication 9 UNIT(S): at 17:43

## 2023-10-12 RX ADMIN — Medication 81 MILLIGRAM(S): at 12:33

## 2023-10-12 RX ADMIN — INSULIN GLARGINE 14 UNIT(S): 100 INJECTION, SOLUTION SUBCUTANEOUS at 22:27

## 2023-10-12 RX ADMIN — Medication 650 MILLIGRAM(S): at 22:00

## 2023-10-12 RX ADMIN — Medication 9 UNIT(S): at 13:39

## 2023-10-12 NOTE — CHART NOTE - NSCHARTNOTEFT_GEN_A_CORE
met with pt to discuss dc plans as SW informed me that someone from [facility] is coming to work with the pt today and they also have a program to teach her additional resources to assist with her impairment    pt just wants to go to her shelter, will discuss further; pt is overwhelmed with her new state    VSS  CHEST non labored  NEURO steady gait  a/w new complete blindness  await safe dispo

## 2023-10-13 LAB
ANION GAP SERPL CALC-SCNC: 11 MMOL/L — SIGNIFICANT CHANGE UP (ref 7–14)
ANION GAP SERPL CALC-SCNC: 17 MMOL/L — HIGH (ref 7–14)
BUN SERPL-MCNC: 19 MG/DL — SIGNIFICANT CHANGE UP (ref 7–23)
BUN SERPL-MCNC: 20 MG/DL — SIGNIFICANT CHANGE UP (ref 7–23)
CALCIUM SERPL-MCNC: 8.7 MG/DL — SIGNIFICANT CHANGE UP (ref 8.4–10.5)
CALCIUM SERPL-MCNC: 8.9 MG/DL — SIGNIFICANT CHANGE UP (ref 8.4–10.5)
CHLORIDE SERPL-SCNC: 100 MMOL/L — SIGNIFICANT CHANGE UP (ref 98–107)
CHLORIDE SERPL-SCNC: 100 MMOL/L — SIGNIFICANT CHANGE UP (ref 98–107)
CO2 SERPL-SCNC: 16 MMOL/L — LOW (ref 22–31)
CO2 SERPL-SCNC: 25 MMOL/L — SIGNIFICANT CHANGE UP (ref 22–31)
CREAT SERPL-MCNC: 0.9 MG/DL — SIGNIFICANT CHANGE UP (ref 0.5–1.3)
CREAT SERPL-MCNC: 0.97 MG/DL — SIGNIFICANT CHANGE UP (ref 0.5–1.3)
EGFR: 70 ML/MIN/1.73M2 — SIGNIFICANT CHANGE UP
EGFR: 77 ML/MIN/1.73M2 — SIGNIFICANT CHANGE UP
GLUCOSE BLDC GLUCOMTR-MCNC: 120 MG/DL — HIGH (ref 70–99)
GLUCOSE BLDC GLUCOMTR-MCNC: 122 MG/DL — HIGH (ref 70–99)
GLUCOSE BLDC GLUCOMTR-MCNC: 136 MG/DL — HIGH (ref 70–99)
GLUCOSE BLDC GLUCOMTR-MCNC: 140 MG/DL — HIGH (ref 70–99)
GLUCOSE BLDC GLUCOMTR-MCNC: 143 MG/DL — HIGH (ref 70–99)
GLUCOSE SERPL-MCNC: 116 MG/DL — HIGH (ref 70–99)
GLUCOSE SERPL-MCNC: 130 MG/DL — HIGH (ref 70–99)
HCT VFR BLD CALC: 32.2 % — LOW (ref 34.5–45)
HCT VFR BLD CALC: 36 % — SIGNIFICANT CHANGE UP (ref 34.5–45)
HGB BLD-MCNC: 10.6 G/DL — LOW (ref 11.5–15.5)
HGB BLD-MCNC: 11.8 G/DL — SIGNIFICANT CHANGE UP (ref 11.5–15.5)
MAGNESIUM SERPL-MCNC: 2.1 MG/DL — SIGNIFICANT CHANGE UP (ref 1.6–2.6)
MAGNESIUM SERPL-MCNC: 2.2 MG/DL — SIGNIFICANT CHANGE UP (ref 1.6–2.6)
MCHC RBC-ENTMCNC: 27.4 PG — SIGNIFICANT CHANGE UP (ref 27–34)
MCHC RBC-ENTMCNC: 27.4 PG — SIGNIFICANT CHANGE UP (ref 27–34)
MCHC RBC-ENTMCNC: 32.8 GM/DL — SIGNIFICANT CHANGE UP (ref 32–36)
MCHC RBC-ENTMCNC: 32.9 GM/DL — SIGNIFICANT CHANGE UP (ref 32–36)
MCV RBC AUTO: 83.2 FL — SIGNIFICANT CHANGE UP (ref 80–100)
MCV RBC AUTO: 83.5 FL — SIGNIFICANT CHANGE UP (ref 80–100)
NRBC # BLD: 0 /100 WBCS — SIGNIFICANT CHANGE UP (ref 0–0)
NRBC # BLD: 0 /100 WBCS — SIGNIFICANT CHANGE UP (ref 0–0)
NRBC # FLD: 0 K/UL — SIGNIFICANT CHANGE UP (ref 0–0)
NRBC # FLD: 0 K/UL — SIGNIFICANT CHANGE UP (ref 0–0)
PHOSPHATE SERPL-MCNC: 3.6 MG/DL — SIGNIFICANT CHANGE UP (ref 2.5–4.5)
PHOSPHATE SERPL-MCNC: 3.8 MG/DL — SIGNIFICANT CHANGE UP (ref 2.5–4.5)
PLATELET # BLD AUTO: 223 K/UL — SIGNIFICANT CHANGE UP (ref 150–400)
PLATELET # BLD AUTO: 241 K/UL — SIGNIFICANT CHANGE UP (ref 150–400)
POTASSIUM SERPL-MCNC: 4.3 MMOL/L — SIGNIFICANT CHANGE UP (ref 3.5–5.3)
POTASSIUM SERPL-MCNC: 5.7 MMOL/L — HIGH (ref 3.5–5.3)
POTASSIUM SERPL-SCNC: 4.3 MMOL/L — SIGNIFICANT CHANGE UP (ref 3.5–5.3)
POTASSIUM SERPL-SCNC: 5.7 MMOL/L — HIGH (ref 3.5–5.3)
RBC # BLD: 3.87 M/UL — SIGNIFICANT CHANGE UP (ref 3.8–5.2)
RBC # BLD: 4.31 M/UL — SIGNIFICANT CHANGE UP (ref 3.8–5.2)
RBC # FLD: 13.7 % — SIGNIFICANT CHANGE UP (ref 10.3–14.5)
RBC # FLD: 13.9 % — SIGNIFICANT CHANGE UP (ref 10.3–14.5)
SODIUM SERPL-SCNC: 133 MMOL/L — LOW (ref 135–145)
SODIUM SERPL-SCNC: 136 MMOL/L — SIGNIFICANT CHANGE UP (ref 135–145)
WBC # BLD: 5.85 K/UL — SIGNIFICANT CHANGE UP (ref 3.8–10.5)
WBC # BLD: 6.39 K/UL — SIGNIFICANT CHANGE UP (ref 3.8–10.5)
WBC # FLD AUTO: 5.85 K/UL — SIGNIFICANT CHANGE UP (ref 3.8–10.5)
WBC # FLD AUTO: 6.39 K/UL — SIGNIFICANT CHANGE UP (ref 3.8–10.5)

## 2023-10-13 PROCEDURE — 99231 SBSQ HOSP IP/OBS SF/LOW 25: CPT

## 2023-10-13 RX ORDER — INSULIN LISPRO 100/ML
7 VIAL (ML) SUBCUTANEOUS
Refills: 0 | Status: DISCONTINUED | OUTPATIENT
Start: 2023-10-13 | End: 2023-10-14

## 2023-10-13 RX ORDER — METFORMIN HYDROCHLORIDE 850 MG/1
500 TABLET ORAL
Refills: 0 | Status: DISCONTINUED | OUTPATIENT
Start: 2023-10-13 | End: 2023-10-19

## 2023-10-13 RX ADMIN — LIDOCAINE 1 PATCH: 4 CREAM TOPICAL at 12:49

## 2023-10-13 RX ADMIN — INSULIN GLARGINE 14 UNIT(S): 100 INJECTION, SOLUTION SUBCUTANEOUS at 23:27

## 2023-10-13 RX ADMIN — Medication 9 UNIT(S): at 12:48

## 2023-10-13 RX ADMIN — Medication 9 UNIT(S): at 08:47

## 2023-10-13 RX ADMIN — Medication 650 MILLIGRAM(S): at 22:02

## 2023-10-13 RX ADMIN — Medication 81 MILLIGRAM(S): at 12:49

## 2023-10-13 RX ADMIN — ENOXAPARIN SODIUM 40 MILLIGRAM(S): 100 INJECTION SUBCUTANEOUS at 08:48

## 2023-10-13 RX ADMIN — Medication 650 MILLIGRAM(S): at 21:12

## 2023-10-13 RX ADMIN — Medication 7 UNIT(S): at 17:55

## 2023-10-13 RX ADMIN — LIDOCAINE 1 PATCH: 4 CREAM TOPICAL at 02:16

## 2023-10-13 NOTE — CHART NOTE - NSCHARTNOTEFT_GEN_A_CORE
As per discussion with endocrine pharmacist Sylvie Blunt, recommend starting pt on Metformin 500mg bid with meals while inpt. Discussed with Dr. Croft who is in agreement. Per Sylvie, continue with insulin therapy with addition of Metformin.     Jagruti Erickson PA-C  Department of Medicine   In-house pager #[phone]

## 2023-10-13 NOTE — CHART NOTE - NSCHARTNOTEFT_GEN_A_CORE
MEDICINE ACP NIGHT COVERAGE    Late Chart Note:    Notified by RN that patient has pressure behind eyes, tingling in B/L feet, and "doesn't feel right". Patient seen and evaluated at bedside, complaining of vague symptoms of pressure in head, tingling in feet and not feeling good. Patient requesting aspirin as has "had a stroke in the past". Upon further questioning patient denies previous stroke symptoms presenting in current manner. She also endorses chronic mild tingling in feet. Patient denies chest pain, SOB, abdominal pain, N/V/D and urinary complaints.     Vital Signs Last 24 Hrs  T(C): 36.7 (10-12-23 @ 20:38), Max: 36.9 (10-12-23 @ 12:41)  T(F): 98.1 (10-12-23 @ 20:38), Max: 98.4 (10-12-23 @ 12:41)  HR: 97 (10-12-23 @ 20:38) (91 - 97)  BP: 140/70 (10-12-23 @ 20:38) (111/62 - 140/70)  RR: 18 (10-12-23 @ 20:38) (17 - 18)  SpO2: 100% (10-12-23 @ 20:38) (97% - 100%)    PHYSICAL EXAM:  GENERAL: No acute distress, well-developed  HEAD:  Atraumatic, Normocephalic  EYES: conjunctiva and sclera clear unable to participate in EOM  NECK: Supple, no lymphadenopathy, no JVD  CHEST/LUNG: CTAB; No wheezes  HEART: Regular rate and rhythm; No murmurs  ABDOMEN: Soft, non-tender, non-distended  EXTREMITIES:  2+ peripheral pulses b/l, No clubbing, cyanosis, or edema  NEUROLOGY: A&O x4, no focal deficits  SKIN: No rashes or lesions    Plan:   - Patient without focal neurological defects at this time   - Tylenol given with improvement of pain   - Consider gabapentin for suspected diabetic peripheral neuropathy   - Will obtain CBC and BMP  - Will continue to monitor, discussed with MARY Motley PA-C  Medicine ACP  [phone]

## 2023-10-13 NOTE — CHART NOTE - NSCHARTNOTEFT_GEN_A_CORE
pt seen and examined by me  SW met with pt to inform, despite me clearing her to return to the shelter, they cannot accept her back due to her visual impairment  pt feels defeated, however now willing to go to snf  reports PT will come see her this afternoon and take her outside to practice with her walking stick  VSS  CHEST non labored  NEURO blind, steady gait  a/w new full blindness  await safe dispo  PT working with pt  JEANIE working for safe dispo  pharmacy working with pt for FS teaching

## 2023-10-14 LAB
GLUCOSE BLDC GLUCOMTR-MCNC: 100 MG/DL — HIGH (ref 70–99)
GLUCOSE BLDC GLUCOMTR-MCNC: 110 MG/DL — HIGH (ref 70–99)
GLUCOSE BLDC GLUCOMTR-MCNC: 128 MG/DL — HIGH (ref 70–99)
GLUCOSE BLDC GLUCOMTR-MCNC: 141 MG/DL — HIGH (ref 70–99)

## 2023-10-14 PROCEDURE — 99232 SBSQ HOSP IP/OBS MODERATE 35: CPT

## 2023-10-14 RX ORDER — INSULIN LISPRO 100/ML
4 VIAL (ML) SUBCUTANEOUS
Refills: 0 | Status: DISCONTINUED | OUTPATIENT
Start: 2023-10-14 | End: 2023-10-19

## 2023-10-14 RX ADMIN — Medication 7 UNIT(S): at 09:00

## 2023-10-14 RX ADMIN — Medication 81 MILLIGRAM(S): at 11:32

## 2023-10-14 RX ADMIN — Medication 4 UNIT(S): at 18:20

## 2023-10-14 RX ADMIN — LIDOCAINE 1 PATCH: 4 CREAM TOPICAL at 11:32

## 2023-10-14 RX ADMIN — INSULIN GLARGINE 14 UNIT(S): 100 INJECTION, SOLUTION SUBCUTANEOUS at 22:12

## 2023-10-14 RX ADMIN — METFORMIN HYDROCHLORIDE 500 MILLIGRAM(S): 850 TABLET ORAL at 09:04

## 2023-10-14 RX ADMIN — METFORMIN HYDROCHLORIDE 500 MILLIGRAM(S): 850 TABLET ORAL at 18:15

## 2023-10-14 NOTE — PROGRESS NOTE ADULT - SUBJECTIVE AND OBJECTIVE BOX
[facility] Division of Hospital Medicine  Stacy Croft MD  Pager [phone]    Patient is a 52y old  Female who presents with a chief complaint of L eye vision loss       SUBJECTIVE / OVERNIGHT EVENTS: pt emotional today but knows and appreciates what we are doing for her; just overwhelmed and frustrated with her situation      MEDICATIONS  (STANDING):  aspirin  chewable 81 milliGRAM(s) Oral daily  dextrose 5%. 1000 milliLiter(s) (50 mL/Hr) IV Continuous <Continuous>  dextrose 5%. 1000 milliLiter(s) (100 mL/Hr) IV Continuous <Continuous>  dextrose 50% Injectable 25 Gram(s) IV Push once  dextrose 50% Injectable 25 Gram(s) IV Push once  dextrose 50% Injectable 12.5 Gram(s) IV Push once  enoxaparin Injectable 40 milliGRAM(s) SubCutaneous every 24 hours  glucagon  Injectable 1 milliGRAM(s) IntraMuscular once  influenza   Vaccine 0.5 milliLiter(s) IntraMuscular once  insulin glargine Injectable (LANTUS) 14 Unit(s) SubCutaneous at bedtime  insulin lispro (ADMELOG) corrective regimen sliding scale   SubCutaneous three times a day before meals  insulin lispro (ADMELOG) corrective regimen sliding scale   SubCutaneous at bedtime  insulin lispro Injectable (ADMELOG) 7 Unit(s) SubCutaneous three times a day before meals  lactulose Syrup 10 Gram(s) Oral two times a day  lidocaine   4% Patch 1 Patch Transdermal daily  metFORMIN 500 milliGRAM(s) Oral two times a day  senna 2 Tablet(s) Oral at bedtime    MEDICATIONS  (PRN):  acetaminophen     Tablet .. 650 milliGRAM(s) Oral every 6 hours PRN Temp greater or equal to 38C (100.4F), Mild Pain (1 - 3)  cyclobenzaprine 5 milliGRAM(s) Oral three times a day PRN Muscle Spasm  dextrose Oral Gel 15 Gram(s) Oral once PRN Blood Glucose LESS THAN 70 milliGRAM(s)/deciliter  melatonin 3 milliGRAM(s) Oral at bedtime PRN Insomnia      CAPILLARY BLOOD GLUCOSE  POCT Blood Glucose.: 141 mg/dL (14 Oct 2023 08:31)  POCT Blood Glucose.: 122 mg/dL (13 Oct 2023 23:25)  POCT Blood Glucose.: 136 mg/dL (13 Oct 2023 22:13)  POCT Blood Glucose.: 120 mg/dL (13 Oct 2023 17:30)  POCT Blood Glucose.: 143 mg/dL (13 Oct 2023 12:15)      PHYSICAL EXAM:  Vital Signs Last 24 Hrs  T(F): 98.5 (14 Oct 2023 06:00), Max: 98.5 (14 Oct 2023 06:00)  HR: 88 (14 Oct 2023 06:00) (88 - 94)  BP: 158/60 (14 Oct 2023 06:00) (130/87 - 158/60)  RR: 18 (14 Oct 2023 06:00) (18 - 18)  SpO2: 100% (14 Oct 2023 06:00) (100% - 100%)    Parameters below as of 14 Oct 2023 06:00  Patient On (Oxygen Delivery Method): room air        CONSTITUTIONAL: NAD, appears comfortable  EYES: PERRLA; conjunctiva and sclera clear  ENMT: Moist oral mucosa; normal dentition  RESPIRATORY: Normal respiratory effort; lungs are clear to auscultation bilaterally  CARDIOVASCULAR: Regular rate and rhythm; No lower extremity edema  ABDOMEN: Nontender to palpation, normoactive bowel sounds  MUSCULOSKELETAL:  no clubbing or cyanosis of digits; no joint swelling or tenderness to palpation  PSYCH: A+O to person, place, and time; affect appropriate  NEUROLOGY: CN 3-12 are intact and symmetric; blind  SKIN: No rashes; no palpable lesions    LABS:

## 2023-10-15 LAB
GLUCOSE BLDC GLUCOMTR-MCNC: 122 MG/DL — HIGH (ref 70–99)
GLUCOSE BLDC GLUCOMTR-MCNC: 125 MG/DL — HIGH (ref 70–99)
GLUCOSE BLDC GLUCOMTR-MCNC: 143 MG/DL — HIGH (ref 70–99)
GLUCOSE BLDC GLUCOMTR-MCNC: 96 MG/DL — SIGNIFICANT CHANGE UP (ref 70–99)

## 2023-10-15 PROCEDURE — 99232 SBSQ HOSP IP/OBS MODERATE 35: CPT

## 2023-10-15 RX ORDER — INSULIN LISPRO 100/ML
2 VIAL (ML) SUBCUTANEOUS ONCE
Refills: 0 | Status: COMPLETED | OUTPATIENT
Start: 2023-10-15 | End: 2023-10-15

## 2023-10-15 RX ADMIN — Medication 81 MILLIGRAM(S): at 11:29

## 2023-10-15 RX ADMIN — METFORMIN HYDROCHLORIDE 500 MILLIGRAM(S): 850 TABLET ORAL at 17:57

## 2023-10-15 RX ADMIN — LIDOCAINE 1 PATCH: 4 CREAM TOPICAL at 11:30

## 2023-10-15 RX ADMIN — INSULIN GLARGINE 14 UNIT(S): 100 INJECTION, SOLUTION SUBCUTANEOUS at 22:54

## 2023-10-15 RX ADMIN — Medication 4 UNIT(S): at 09:01

## 2023-10-15 RX ADMIN — Medication 4 UNIT(S): at 13:12

## 2023-10-15 RX ADMIN — LIDOCAINE 1 PATCH: 4 CREAM TOPICAL at 23:00

## 2023-10-15 RX ADMIN — Medication 2 UNIT(S): at 17:56

## 2023-10-15 RX ADMIN — METFORMIN HYDROCHLORIDE 500 MILLIGRAM(S): 850 TABLET ORAL at 10:12

## 2023-10-15 NOTE — PROGRESS NOTE ADULT - SUBJECTIVE AND OBJECTIVE BOX
[facility] Division of Hospital Medicine  Stacy Croft MD  Pager [phone]    Patient is a 52y old  Female who presents with a chief complaint of L eye vision loss      SUBJECTIVE / OVERNIGHT EVENTS: watching/listening to the news this AM and disheartened by the world tragedy that is ongoing; otherwise pt ok, seeing more light today      MEDICATIONS  (STANDING):  aspirin  chewable 81 milliGRAM(s) Oral daily  dextrose 5%. 1000 milliLiter(s) (50 mL/Hr) IV Continuous <Continuous>  dextrose 5%. 1000 milliLiter(s) (100 mL/Hr) IV Continuous <Continuous>  dextrose 50% Injectable 25 Gram(s) IV Push once  dextrose 50% Injectable 12.5 Gram(s) IV Push once  dextrose 50% Injectable 25 Gram(s) IV Push once  enoxaparin Injectable 40 milliGRAM(s) SubCutaneous every 24 hours  glucagon  Injectable 1 milliGRAM(s) IntraMuscular once  influenza   Vaccine 0.5 milliLiter(s) IntraMuscular once  insulin glargine Injectable (LANTUS) 14 Unit(s) SubCutaneous at bedtime  insulin lispro (ADMELOG) corrective regimen sliding scale   SubCutaneous three times a day before meals  insulin lispro (ADMELOG) corrective regimen sliding scale   SubCutaneous at bedtime  insulin lispro Injectable (ADMELOG) 4 Unit(s) SubCutaneous three times a day before meals  lactulose Syrup 10 Gram(s) Oral two times a day  lidocaine   4% Patch 1 Patch Transdermal daily  metFORMIN 500 milliGRAM(s) Oral two times a day  senna 2 Tablet(s) Oral at bedtime    MEDICATIONS  (PRN):  acetaminophen     Tablet .. 650 milliGRAM(s) Oral every 6 hours PRN Temp greater or equal to 38C (100.4F), Mild Pain (1 - 3)  cyclobenzaprine 5 milliGRAM(s) Oral three times a day PRN Muscle Spasm  dextrose Oral Gel 15 Gram(s) Oral once PRN Blood Glucose LESS THAN 70 milliGRAM(s)/deciliter  melatonin 3 milliGRAM(s) Oral at bedtime PRN Insomnia      CAPILLARY BLOOD GLUCOSE  POCT Blood Glucose.: 128 mg/dL (14 Oct 2023 22:02)  POCT Blood Glucose.: 110 mg/dL (14 Oct 2023 17:49)  POCT Blood Glucose.: 100 mg/dL (14 Oct 2023 12:04)  POCT Blood Glucose.: 141 mg/dL (14 Oct 2023 08:31)      PHYSICAL EXAM:  Vital Signs Last 24 Hrs  T(F): 98.4 (15 Oct 2023 06:50), Max: 98.4 (15 Oct 2023 06:50)  HR: 96 (15 Oct 2023 07:59) (95 - 98)  BP: 127/84 (15 Oct 2023 07:59) (127/80 - 148/91)  RR: 18 (15 Oct 2023 06:50) (18 - 18)  SpO2: 98% (15 Oct 2023 06:50) (98% - 100%)    Parameters below as of 15 Oct 2023 06:50  Patient On (Oxygen Delivery Method): room air        CONSTITUTIONAL: NAD, appears comfortable  EYES: PERRLA; conjunctiva and sclera clear  ENMT: Moist oral mucosa; normal dentition  RESPIRATORY: Normal respiratory effort; lungs are clear to auscultation bilaterally  CARDIOVASCULAR: Regular rate and rhythm; No lower extremity edema  ABDOMEN: Nontender to palpation, normoactive bowel sounds  MUSCULOSKELETAL:  no clubbing or cyanosis of digits; no joint swelling or tenderness to palpation  PSYCH: A+O to person, place, and time; affect appropriate  NEUROLOGY: CN 3-12 are intact and symmetric; no gross sensory deficits   SKIN: No rashes; no palpable lesions    LABS:

## 2023-10-16 LAB
GLUCOSE BLDC GLUCOMTR-MCNC: 101 MG/DL — HIGH (ref 70–99)
GLUCOSE BLDC GLUCOMTR-MCNC: 115 MG/DL — HIGH (ref 70–99)
GLUCOSE BLDC GLUCOMTR-MCNC: 162 MG/DL — HIGH (ref 70–99)
GLUCOSE BLDC GLUCOMTR-MCNC: 162 MG/DL — HIGH (ref 70–99)
GLUCOSE BLDC GLUCOMTR-MCNC: 98 MG/DL — SIGNIFICANT CHANGE UP (ref 70–99)
GLUCOSE BLDC GLUCOMTR-MCNC: 98 MG/DL — SIGNIFICANT CHANGE UP (ref 70–99)

## 2023-10-16 PROCEDURE — 99232 SBSQ HOSP IP/OBS MODERATE 35: CPT

## 2023-10-16 RX ADMIN — Medication 81 MILLIGRAM(S): at 08:43

## 2023-10-16 RX ADMIN — LIDOCAINE 1 PATCH: 4 CREAM TOPICAL at 12:36

## 2023-10-16 RX ADMIN — LIDOCAINE 1 PATCH: 4 CREAM TOPICAL at 19:24

## 2023-10-16 RX ADMIN — Medication 4 UNIT(S): at 12:37

## 2023-10-16 RX ADMIN — LIDOCAINE 1 PATCH: 4 CREAM TOPICAL at 23:24

## 2023-10-16 RX ADMIN — SENNA PLUS 2 TABLET(S): 8.6 TABLET ORAL at 21:29

## 2023-10-16 RX ADMIN — METFORMIN HYDROCHLORIDE 500 MILLIGRAM(S): 850 TABLET ORAL at 18:14

## 2023-10-16 RX ADMIN — METFORMIN HYDROCHLORIDE 500 MILLIGRAM(S): 850 TABLET ORAL at 08:43

## 2023-10-16 RX ADMIN — INSULIN GLARGINE 14 UNIT(S): 100 INJECTION, SOLUTION SUBCUTANEOUS at 22:23

## 2023-10-16 RX ADMIN — Medication 4 UNIT(S): at 08:44

## 2023-10-16 NOTE — PROGRESS NOTE ADULT - NSPROGADDITIONALINFOA_GEN_ALL_CORE
trying to coord care, safety for dc  await blind while walking cane and talking glucometer
await safe dispo, pt agreeable to EVELYNE

## 2023-10-16 NOTE — PROGRESS NOTE ADULT - SUBJECTIVE AND OBJECTIVE BOX
[facility] Division of Hospital Medicine  Stacy Croft MD  Pager [phone]    Patient is a 52y old  Female who presents with a chief complaint of L eye vision loss       SUBJECTIVE / OVERNIGHT EVENTS: doing OK; states she does not like the release form the person from [employer] brought her; she does not want to sign it      MEDICATIONS  (STANDING):  aspirin  chewable 81 milliGRAM(s) Oral daily  dextrose 5%. 1000 milliLiter(s) (100 mL/Hr) IV Continuous <Continuous>  dextrose 5%. 1000 milliLiter(s) (50 mL/Hr) IV Continuous <Continuous>  dextrose 50% Injectable 25 Gram(s) IV Push once  dextrose 50% Injectable 12.5 Gram(s) IV Push once  dextrose 50% Injectable 25 Gram(s) IV Push once  enoxaparin Injectable 40 milliGRAM(s) SubCutaneous every 24 hours  glucagon  Injectable 1 milliGRAM(s) IntraMuscular once  influenza   Vaccine 0.5 milliLiter(s) IntraMuscular once  insulin glargine Injectable (LANTUS) 14 Unit(s) SubCutaneous at bedtime  insulin lispro (ADMELOG) corrective regimen sliding scale   SubCutaneous three times a day before meals  insulin lispro (ADMELOG) corrective regimen sliding scale   SubCutaneous at bedtime  insulin lispro Injectable (ADMELOG) 4 Unit(s) SubCutaneous three times a day before meals  lactulose Syrup 10 Gram(s) Oral two times a day  lidocaine   4% Patch 1 Patch Transdermal daily  metFORMIN 500 milliGRAM(s) Oral two times a day  senna 2 Tablet(s) Oral at bedtime    MEDICATIONS  (PRN):  acetaminophen     Tablet .. 650 milliGRAM(s) Oral every 6 hours PRN Temp greater or equal to 38C (100.4F), Mild Pain (1 - 3)  cyclobenzaprine 5 milliGRAM(s) Oral three times a day PRN Muscle Spasm  dextrose Oral Gel 15 Gram(s) Oral once PRN Blood Glucose LESS THAN 70 milliGRAM(s)/deciliter  melatonin 3 milliGRAM(s) Oral at bedtime PRN Insomnia      CAPILLARY BLOOD GLUCOSE  POCT Blood Glucose.: 101 mg/dL (16 Oct 2023 08:21)  POCT Blood Glucose.: 125 mg/dL (15 Oct 2023 22:38)  POCT Blood Glucose.: 96 mg/dL (15 Oct 2023 17:06)    I&O's Summary      PHYSICAL EXAM:  Vital Signs Last 24 Hrs  T(F): 98 (16 Oct 2023 05:00), Max: 98.6 (15 Oct 2023 22:43)  HR: 96 (16 Oct 2023 05:00) (96 - 99)  BP: 129/77 (16 Oct 2023 05:00) (111/82 - 129/77)  RR: 17 (16 Oct 2023 05:00) (17 - 18)  SpO2: 98% (16 Oct 2023 05:00) (98% - 100%)    Parameters below as of 16 Oct 2023 05:00  Patient On (Oxygen Delivery Method): room air        CONSTITUTIONAL: NAD, well-developed, well-groomed  EYES: PERRLA; conjunctiva and sclera clear  ENMT: Moist oral mucosa; normal dentition  RESPIRATORY: Normal respiratory effort; lungs are clear to auscultation bilaterally  CARDIOVASCULAR: Regular rate and rhythm; No lower extremity edema  ABDOMEN: Nontender to palpation, normoactive bowel sounds  MUSCULOSKELETAL:   no clubbing or cyanosis of digits; no joint swelling or tenderness to palpation  PSYCH: A+O to person, place, and time; affect appropriate  NEUROLOGY: CN 3-12 are intact and symmetric; no gross sensory deficits   SKIN: No rashes; no palpable lesions    LABS:

## 2023-10-17 DIAGNOSIS — R11.2 NAUSEA WITH VOMITING, UNSPECIFIED: ICD-10-CM

## 2023-10-17 LAB
GLUCOSE BLDC GLUCOMTR-MCNC: 105 MG/DL — HIGH (ref 70–99)
GLUCOSE BLDC GLUCOMTR-MCNC: 105 MG/DL — HIGH (ref 70–99)
GLUCOSE BLDC GLUCOMTR-MCNC: 108 MG/DL — HIGH (ref 70–99)
GLUCOSE BLDC GLUCOMTR-MCNC: 108 MG/DL — HIGH (ref 70–99)
GLUCOSE BLDC GLUCOMTR-MCNC: 137 MG/DL — HIGH (ref 70–99)
GLUCOSE BLDC GLUCOMTR-MCNC: 137 MG/DL — HIGH (ref 70–99)
GLUCOSE BLDC GLUCOMTR-MCNC: 199 MG/DL — HIGH (ref 70–99)
GLUCOSE BLDC GLUCOMTR-MCNC: 199 MG/DL — HIGH (ref 70–99)

## 2023-10-17 PROCEDURE — 99232 SBSQ HOSP IP/OBS MODERATE 35: CPT

## 2023-10-17 RX ADMIN — LIDOCAINE 1 PATCH: 4 CREAM TOPICAL at 19:54

## 2023-10-17 RX ADMIN — INSULIN GLARGINE 14 UNIT(S): 100 INJECTION, SOLUTION SUBCUTANEOUS at 22:37

## 2023-10-17 RX ADMIN — METFORMIN HYDROCHLORIDE 500 MILLIGRAM(S): 850 TABLET ORAL at 10:23

## 2023-10-17 RX ADMIN — LIDOCAINE 1 PATCH: 4 CREAM TOPICAL at 12:27

## 2023-10-17 RX ADMIN — Medication 81 MILLIGRAM(S): at 12:27

## 2023-10-17 RX ADMIN — Medication 4 UNIT(S): at 12:24

## 2023-10-17 RX ADMIN — Medication 1: at 12:24

## 2023-10-17 RX ADMIN — SENNA PLUS 2 TABLET(S): 8.6 TABLET ORAL at 22:37

## 2023-10-17 RX ADMIN — METFORMIN HYDROCHLORIDE 500 MILLIGRAM(S): 850 TABLET ORAL at 17:59

## 2023-10-17 NOTE — CHART NOTE - NSCHARTNOTEFT_GEN_A_CORE
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     Nutrition Course: Patient seen at bedside. Reports appetite remains good in hospital, able to complete most of her meals. PO intake % per RN flow sheet. Denies chewing and swallowing difficulties on current diet. Denies any  acute GI distress (nausea/vomiting/diarrhea/constipation.) Last BM yesterday as per pt. Pt previously noted with constipation as per RN flow sheet. Bowel regimen in use. Constipation resolved. Pt's labs notable with POCT ranging from . Continues following Consistent Carbohydrate diet and on insulin regimen for glycemic control in hospital. Pt has no nutrition related questions nor concerns. RD to remain available for further nutritional interventions as indicated.          Diet, Consistent Carbohydrate w/Evening Snack (10-01-23 @ 10:11)      GI: WDL. Last BM yesterday     PO intake:  % [x ]      Anthropometrics: Height (cm): 157.5 (10-05)  Weight (kg): 72.6 (10-05)  BMI (kg/m2): 29.3 (10-05)    Edema: None reported at present.     Pressure Injuries: None reported at present.     __________________ Pertinent Medications__________________   MEDICATIONS  (STANDING):  aspirin  chewable 81 milliGRAM(s) Oral daily  dextrose 5%. 1000 milliLiter(s) (100 mL/Hr) IV Continuous <Continuous>  dextrose 5%. 1000 milliLiter(s) (50 mL/Hr) IV Continuous <Continuous>  dextrose 50% Injectable 25 Gram(s) IV Push once  dextrose 50% Injectable 25 Gram(s) IV Push once  dextrose 50% Injectable 12.5 Gram(s) IV Push once  enoxaparin Injectable 40 milliGRAM(s) SubCutaneous every 24 hours  glucagon  Injectable 1 milliGRAM(s) IntraMuscular once  influenza   Vaccine 0.5 milliLiter(s) IntraMuscular once  insulin glargine Injectable (LANTUS) 14 Unit(s) SubCutaneous at bedtime  insulin lispro (ADMELOG) corrective regimen sliding scale   SubCutaneous at bedtime  insulin lispro (ADMELOG) corrective regimen sliding scale   SubCutaneous three times a day before meals  insulin lispro Injectable (ADMELOG) 4 Unit(s) SubCutaneous three times a day before meals  lactulose Syrup 10 Gram(s) Oral two times a day  lidocaine   4% Patch 1 Patch Transdermal daily  metFORMIN 500 milliGRAM(s) Oral two times a day  senna 2 Tablet(s) Oral at bedtime    MEDICATIONS  (PRN):  acetaminophen     Tablet .. 650 milliGRAM(s) Oral every 6 hours PRN Temp greater or equal to 38C (100.4F), Mild Pain (1 - 3)  cyclobenzaprine 5 milliGRAM(s) Oral three times a day PRN Muscle Spasm  dextrose Oral Gel 15 Gram(s) Oral once PRN Blood Glucose LESS THAN 70 milliGRAM(s)/deciliter  melatonin 3 milliGRAM(s) Oral at bedtime PRN Insomnia      __________________ Pertinent Labs__________________    10-13 Phos 3.6 mg/dL        POCT Blood Glucose.: 199 mg/dL (10-17-23 @ 12:11)  POCT Blood Glucose.: 105 mg/dL (10-17-23 @ 08:41)  POCT Blood Glucose.: 162 mg/dL (10-16-23 @ 22:16)  POCT Blood Glucose.: 98 mg/dL (10-16-23 @ 17:28)  POCT Blood Glucose.: 115 mg/dL (10-16-23 @ 12:10)  POCT Blood Glucose.: 101 mg/dL (10-16-23 @ 08:21)  POCT Blood Glucose.: 125 mg/dL (10-15-23 @ 22:38)  POCT Blood Glucose.: 96 mg/dL (10-15-23 @ 17:06)  POCT Blood Glucose.: 143 mg/dL (10-15-23 @ 11:56)  POCT Blood Glucose.: 122 mg/dL (10-15-23 @ 08:27)  POCT Blood Glucose.: 128 mg/dL (10-14-23 @ 22:02)  POCT Blood Glucose.: 110 mg/dL (10-14-23 @ 17:49)          Estimated Needs:   [ x] no change since previous assessment    Previous Nutrition Diagnosis: Altered Nutrition Related Lab Values    Nutrition Diagnosis is [x ] ongoing      Recommendations:  1) Recommend continue with current diet, which remains appropriate at this time.   2) Encourage PO intake and honor food preferences as able.   3) Monitor PO intake, Labs, weights, BMs, and skin integrity.   4) RD to remain available for further nutritional interventions as indicated.       Monitoring and Evaluation:      [ x] Tolerance to diet prescription [x ] weights [x ] follow up per protocol  [ ] other: independent

## 2023-10-17 NOTE — PROGRESS NOTE ADULT - SUBJECTIVE AND OBJECTIVE BOX
DISPLAY PLAN FREE TEXT [facility] Division of Hospital Medicine  Stacy Croft MD  Pager [phone]    Patient is a 52y old  Female who presents with a chief complaint of L eye vision loss       SUBJECTIVE / OVERNIGHT EVENTS: PM events noted; no further vomiting or abd pain; not sure if it was the medication or the fish she ate; will cont to monitor      MEDICATIONS  (STANDING):  aspirin  chewable 81 milliGRAM(s) Oral daily  dextrose 5%. 1000 milliLiter(s) (100 mL/Hr) IV Continuous <Continuous>  dextrose 5%. 1000 milliLiter(s) (50 mL/Hr) IV Continuous <Continuous>  dextrose 50% Injectable 25 Gram(s) IV Push once  dextrose 50% Injectable 25 Gram(s) IV Push once  dextrose 50% Injectable 12.5 Gram(s) IV Push once  enoxaparin Injectable 40 milliGRAM(s) SubCutaneous every 24 hours  glucagon  Injectable 1 milliGRAM(s) IntraMuscular once  influenza   Vaccine 0.5 milliLiter(s) IntraMuscular once  insulin glargine Injectable (LANTUS) 14 Unit(s) SubCutaneous at bedtime  insulin lispro (ADMELOG) corrective regimen sliding scale   SubCutaneous at bedtime  insulin lispro (ADMELOG) corrective regimen sliding scale   SubCutaneous three times a day before meals  insulin lispro Injectable (ADMELOG) 4 Unit(s) SubCutaneous three times a day before meals  lactulose Syrup 10 Gram(s) Oral two times a day  lidocaine   4% Patch 1 Patch Transdermal daily  metFORMIN 500 milliGRAM(s) Oral two times a day  senna 2 Tablet(s) Oral at bedtime    MEDICATIONS  (PRN):  acetaminophen     Tablet .. 650 milliGRAM(s) Oral every 6 hours PRN Temp greater or equal to 38C (100.4F), Mild Pain (1 - 3)  cyclobenzaprine 5 milliGRAM(s) Oral three times a day PRN Muscle Spasm  dextrose Oral Gel 15 Gram(s) Oral once PRN Blood Glucose LESS THAN 70 milliGRAM(s)/deciliter  melatonin 3 milliGRAM(s) Oral at bedtime PRN Insomnia      CAPILLARY BLOOD GLUCOSE  POCT Blood Glucose.: 199 mg/dL (17 Oct 2023 12:11)  POCT Blood Glucose.: 105 mg/dL (17 Oct 2023 08:41)  POCT Blood Glucose.: 162 mg/dL (16 Oct 2023 22:16)  POCT Blood Glucose.: 98 mg/dL (16 Oct 2023 17:28)      PHYSICAL EXAM:  Vital Signs Last 24 Hrs  T(F): 98.1 (17 Oct 2023 05:22), Max: 98.3 (16 Oct 2023 20:30)  HR: 91 (17 Oct 2023 05:22) (91 - 95)  BP: 135/71 (17 Oct 2023 05:22) (135/71 - 140/80)  RR: 17 (17 Oct 2023 05:22) (17 - 18)  SpO2: 97% (17 Oct 2023 05:22) (97% - 98%)    Parameters below as of 17 Oct 2023 05:22  Patient On (Oxygen Delivery Method): room air        CONSTITUTIONAL: NAD, appears comfortable  EYES: PERRLA; conjunctiva and sclera clear  ENMT: Moist oral mucosa; normal dentition  RESPIRATORY: Normal respiratory effort; lungs are clear to auscultation bilaterally  CARDIOVASCULAR: Regular rate and rhythm; No lower extremity edema;   ABDOMEN: Nontender to palpation, normoactive bowel sounds, no rebound/guarding; No hepatosplenomegaly  MUSCULOSKELETAL:  Normal gait; no clubbing or cyanosis of digits; no joint swelling or tenderness to palpation  PSYCH: A+O to person, place, and time; affect appropriate  NEUROLOGY: CN 3-12 are intact and symmetric; no gross sensory deficits   SKIN: No rashes; no palpable lesions    LABS:

## 2023-10-18 LAB
ANION GAP SERPL CALC-SCNC: 10 MMOL/L — SIGNIFICANT CHANGE UP (ref 7–14)
ANION GAP SERPL CALC-SCNC: 10 MMOL/L — SIGNIFICANT CHANGE UP (ref 7–14)
BASOPHILS # BLD AUTO: 0.04 K/UL — SIGNIFICANT CHANGE UP (ref 0–0.2)
BASOPHILS # BLD AUTO: 0.04 K/UL — SIGNIFICANT CHANGE UP (ref 0–0.2)
BASOPHILS NFR BLD AUTO: 0.6 % — SIGNIFICANT CHANGE UP (ref 0–2)
BASOPHILS NFR BLD AUTO: 0.6 % — SIGNIFICANT CHANGE UP (ref 0–2)
BUN SERPL-MCNC: 19 MG/DL — SIGNIFICANT CHANGE UP (ref 7–23)
BUN SERPL-MCNC: 19 MG/DL — SIGNIFICANT CHANGE UP (ref 7–23)
CALCIUM SERPL-MCNC: 9.1 MG/DL — SIGNIFICANT CHANGE UP (ref 8.4–10.5)
CALCIUM SERPL-MCNC: 9.1 MG/DL — SIGNIFICANT CHANGE UP (ref 8.4–10.5)
CHLORIDE SERPL-SCNC: 102 MMOL/L — SIGNIFICANT CHANGE UP (ref 98–107)
CHLORIDE SERPL-SCNC: 102 MMOL/L — SIGNIFICANT CHANGE UP (ref 98–107)
CO2 SERPL-SCNC: 28 MMOL/L — SIGNIFICANT CHANGE UP (ref 22–31)
CO2 SERPL-SCNC: 28 MMOL/L — SIGNIFICANT CHANGE UP (ref 22–31)
CREAT SERPL-MCNC: 0.94 MG/DL — SIGNIFICANT CHANGE UP (ref 0.5–1.3)
CREAT SERPL-MCNC: 0.94 MG/DL — SIGNIFICANT CHANGE UP (ref 0.5–1.3)
EGFR: 73 ML/MIN/1.73M2 — SIGNIFICANT CHANGE UP
EGFR: 73 ML/MIN/1.73M2 — SIGNIFICANT CHANGE UP
EOSINOPHIL # BLD AUTO: 0.13 K/UL — SIGNIFICANT CHANGE UP (ref 0–0.5)
EOSINOPHIL # BLD AUTO: 0.13 K/UL — SIGNIFICANT CHANGE UP (ref 0–0.5)
EOSINOPHIL NFR BLD AUTO: 2.1 % — SIGNIFICANT CHANGE UP (ref 0–6)
EOSINOPHIL NFR BLD AUTO: 2.1 % — SIGNIFICANT CHANGE UP (ref 0–6)
GLUCOSE BLDC GLUCOMTR-MCNC: 111 MG/DL — HIGH (ref 70–99)
GLUCOSE BLDC GLUCOMTR-MCNC: 111 MG/DL — HIGH (ref 70–99)
GLUCOSE BLDC GLUCOMTR-MCNC: 114 MG/DL — HIGH (ref 70–99)
GLUCOSE BLDC GLUCOMTR-MCNC: 114 MG/DL — HIGH (ref 70–99)
GLUCOSE BLDC GLUCOMTR-MCNC: 182 MG/DL — HIGH (ref 70–99)
GLUCOSE BLDC GLUCOMTR-MCNC: 182 MG/DL — HIGH (ref 70–99)
GLUCOSE BLDC GLUCOMTR-MCNC: 88 MG/DL — SIGNIFICANT CHANGE UP (ref 70–99)
GLUCOSE BLDC GLUCOMTR-MCNC: 88 MG/DL — SIGNIFICANT CHANGE UP (ref 70–99)
GLUCOSE SERPL-MCNC: 107 MG/DL — HIGH (ref 70–99)
GLUCOSE SERPL-MCNC: 107 MG/DL — HIGH (ref 70–99)
HCT VFR BLD CALC: 33.5 % — LOW (ref 34.5–45)
HCT VFR BLD CALC: 33.5 % — LOW (ref 34.5–45)
HGB BLD-MCNC: 11 G/DL — LOW (ref 11.5–15.5)
HGB BLD-MCNC: 11 G/DL — LOW (ref 11.5–15.5)
IANC: 3.46 K/UL — SIGNIFICANT CHANGE UP (ref 1.8–7.4)
IANC: 3.46 K/UL — SIGNIFICANT CHANGE UP (ref 1.8–7.4)
IMM GRANULOCYTES NFR BLD AUTO: 0.3 % — SIGNIFICANT CHANGE UP (ref 0–0.9)
IMM GRANULOCYTES NFR BLD AUTO: 0.3 % — SIGNIFICANT CHANGE UP (ref 0–0.9)
LYMPHOCYTES # BLD AUTO: 2.19 K/UL — SIGNIFICANT CHANGE UP (ref 1–3.3)
LYMPHOCYTES # BLD AUTO: 2.19 K/UL — SIGNIFICANT CHANGE UP (ref 1–3.3)
LYMPHOCYTES # BLD AUTO: 34.8 % — SIGNIFICANT CHANGE UP (ref 13–44)
LYMPHOCYTES # BLD AUTO: 34.8 % — SIGNIFICANT CHANGE UP (ref 13–44)
MAGNESIUM SERPL-MCNC: 2.1 MG/DL — SIGNIFICANT CHANGE UP (ref 1.6–2.6)
MAGNESIUM SERPL-MCNC: 2.1 MG/DL — SIGNIFICANT CHANGE UP (ref 1.6–2.6)
MCHC RBC-ENTMCNC: 27.3 PG — SIGNIFICANT CHANGE UP (ref 27–34)
MCHC RBC-ENTMCNC: 27.3 PG — SIGNIFICANT CHANGE UP (ref 27–34)
MCHC RBC-ENTMCNC: 32.8 GM/DL — SIGNIFICANT CHANGE UP (ref 32–36)
MCHC RBC-ENTMCNC: 32.8 GM/DL — SIGNIFICANT CHANGE UP (ref 32–36)
MCV RBC AUTO: 83.1 FL — SIGNIFICANT CHANGE UP (ref 80–100)
MCV RBC AUTO: 83.1 FL — SIGNIFICANT CHANGE UP (ref 80–100)
MONOCYTES # BLD AUTO: 0.46 K/UL — SIGNIFICANT CHANGE UP (ref 0–0.9)
MONOCYTES # BLD AUTO: 0.46 K/UL — SIGNIFICANT CHANGE UP (ref 0–0.9)
MONOCYTES NFR BLD AUTO: 7.3 % — SIGNIFICANT CHANGE UP (ref 2–14)
MONOCYTES NFR BLD AUTO: 7.3 % — SIGNIFICANT CHANGE UP (ref 2–14)
NEUTROPHILS # BLD AUTO: 3.46 K/UL — SIGNIFICANT CHANGE UP (ref 1.8–7.4)
NEUTROPHILS # BLD AUTO: 3.46 K/UL — SIGNIFICANT CHANGE UP (ref 1.8–7.4)
NEUTROPHILS NFR BLD AUTO: 54.9 % — SIGNIFICANT CHANGE UP (ref 43–77)
NEUTROPHILS NFR BLD AUTO: 54.9 % — SIGNIFICANT CHANGE UP (ref 43–77)
NRBC # BLD: 0 /100 WBCS — SIGNIFICANT CHANGE UP (ref 0–0)
NRBC # BLD: 0 /100 WBCS — SIGNIFICANT CHANGE UP (ref 0–0)
NRBC # FLD: 0 K/UL — SIGNIFICANT CHANGE UP (ref 0–0)
NRBC # FLD: 0 K/UL — SIGNIFICANT CHANGE UP (ref 0–0)
PHOSPHATE SERPL-MCNC: 3.7 MG/DL — SIGNIFICANT CHANGE UP (ref 2.5–4.5)
PHOSPHATE SERPL-MCNC: 3.7 MG/DL — SIGNIFICANT CHANGE UP (ref 2.5–4.5)
PLATELET # BLD AUTO: 220 K/UL — SIGNIFICANT CHANGE UP (ref 150–400)
PLATELET # BLD AUTO: 220 K/UL — SIGNIFICANT CHANGE UP (ref 150–400)
POTASSIUM SERPL-MCNC: 4.2 MMOL/L — SIGNIFICANT CHANGE UP (ref 3.5–5.3)
POTASSIUM SERPL-MCNC: 4.2 MMOL/L — SIGNIFICANT CHANGE UP (ref 3.5–5.3)
POTASSIUM SERPL-SCNC: 4.2 MMOL/L — SIGNIFICANT CHANGE UP (ref 3.5–5.3)
POTASSIUM SERPL-SCNC: 4.2 MMOL/L — SIGNIFICANT CHANGE UP (ref 3.5–5.3)
RBC # BLD: 4.03 M/UL — SIGNIFICANT CHANGE UP (ref 3.8–5.2)
RBC # BLD: 4.03 M/UL — SIGNIFICANT CHANGE UP (ref 3.8–5.2)
RBC # FLD: 13.7 % — SIGNIFICANT CHANGE UP (ref 10.3–14.5)
RBC # FLD: 13.7 % — SIGNIFICANT CHANGE UP (ref 10.3–14.5)
SODIUM SERPL-SCNC: 140 MMOL/L — SIGNIFICANT CHANGE UP (ref 135–145)
SODIUM SERPL-SCNC: 140 MMOL/L — SIGNIFICANT CHANGE UP (ref 135–145)
WBC # BLD: 6.3 K/UL — SIGNIFICANT CHANGE UP (ref 3.8–10.5)
WBC # BLD: 6.3 K/UL — SIGNIFICANT CHANGE UP (ref 3.8–10.5)
WBC # FLD AUTO: 6.3 K/UL — SIGNIFICANT CHANGE UP (ref 3.8–10.5)
WBC # FLD AUTO: 6.3 K/UL — SIGNIFICANT CHANGE UP (ref 3.8–10.5)

## 2023-10-18 PROCEDURE — 99231 SBSQ HOSP IP/OBS SF/LOW 25: CPT

## 2023-10-18 RX ADMIN — METFORMIN HYDROCHLORIDE 500 MILLIGRAM(S): 850 TABLET ORAL at 17:49

## 2023-10-18 RX ADMIN — LIDOCAINE 1 PATCH: 4 CREAM TOPICAL at 19:40

## 2023-10-18 RX ADMIN — INSULIN GLARGINE 14 UNIT(S): 100 INJECTION, SOLUTION SUBCUTANEOUS at 22:17

## 2023-10-18 RX ADMIN — METFORMIN HYDROCHLORIDE 500 MILLIGRAM(S): 850 TABLET ORAL at 08:56

## 2023-10-18 RX ADMIN — LIDOCAINE 1 PATCH: 4 CREAM TOPICAL at 12:17

## 2023-10-18 RX ADMIN — Medication 1: at 12:15

## 2023-10-18 RX ADMIN — Medication 4 UNIT(S): at 12:16

## 2023-10-18 RX ADMIN — Medication 81 MILLIGRAM(S): at 12:16

## 2023-10-18 RX ADMIN — LIDOCAINE 1 PATCH: 4 CREAM TOPICAL at 00:02

## 2023-10-18 NOTE — PROVIDER CONTACT NOTE (MEDICATION) - ACTION/TREATMENT ORDERED:
pt educated on the indication of insulin and Lovenox, pt stated "I told you before I'm taking that". insulin was not given. plan of care continues

## 2023-10-18 NOTE — CHART NOTE - NSCHARTNOTEFT_GEN_A_CORE
pt seen and eval with SW at bedside  d/w pt dispo options; there is an age limit for assisted living which she does not meet  offered again SNF, as a temporary option to give her an address where she can start receiving services and training to learn independence with her new disability with the goal of getting the independent housing    she is upset about this prospect but understands there aren't any other real options; we left the pt to think about our discussion but will likely proceed with this plan; other option d/w pt if she leaves AMA and goes to the intake at her shelter, she was told she'd likely be sent to another hospital and need to start this process all over again.    VSS  CHEST non labored  NEURO: blind, gait steady; learning walking with blind cane  a/w new blindness  await safe dispo  gluc control improved

## 2023-10-19 LAB
GLUCOSE BLDC GLUCOMTR-MCNC: 126 MG/DL — HIGH (ref 70–99)
GLUCOSE BLDC GLUCOMTR-MCNC: 126 MG/DL — HIGH (ref 70–99)
GLUCOSE BLDC GLUCOMTR-MCNC: 139 MG/DL — HIGH (ref 70–99)
GLUCOSE BLDC GLUCOMTR-MCNC: 139 MG/DL — HIGH (ref 70–99)
GLUCOSE BLDC GLUCOMTR-MCNC: 160 MG/DL — HIGH (ref 70–99)
GLUCOSE BLDC GLUCOMTR-MCNC: 160 MG/DL — HIGH (ref 70–99)
GLUCOSE BLDC GLUCOMTR-MCNC: 94 MG/DL — SIGNIFICANT CHANGE UP (ref 70–99)
GLUCOSE BLDC GLUCOMTR-MCNC: 94 MG/DL — SIGNIFICANT CHANGE UP (ref 70–99)

## 2023-10-19 PROCEDURE — 99232 SBSQ HOSP IP/OBS MODERATE 35: CPT

## 2023-10-19 RX ORDER — METFORMIN HYDROCHLORIDE 850 MG/1
500 TABLET ORAL DAILY
Refills: 0 | Status: DISCONTINUED | OUTPATIENT
Start: 2023-10-19 | End: 2023-10-26

## 2023-10-19 RX ADMIN — LIDOCAINE 1 PATCH: 4 CREAM TOPICAL at 00:43

## 2023-10-19 RX ADMIN — Medication 2 DROP(S): at 09:14

## 2023-10-19 RX ADMIN — Medication 2 DROP(S): at 18:02

## 2023-10-19 RX ADMIN — Medication 1: at 12:32

## 2023-10-19 RX ADMIN — Medication 650 MILLIGRAM(S): at 09:09

## 2023-10-19 RX ADMIN — LIDOCAINE 1 PATCH: 4 CREAM TOPICAL at 19:00

## 2023-10-19 RX ADMIN — METFORMIN HYDROCHLORIDE 500 MILLIGRAM(S): 850 TABLET ORAL at 08:49

## 2023-10-19 RX ADMIN — Medication 650 MILLIGRAM(S): at 10:05

## 2023-10-19 RX ADMIN — Medication 81 MILLIGRAM(S): at 12:31

## 2023-10-19 RX ADMIN — LIDOCAINE 1 PATCH: 4 CREAM TOPICAL at 12:31

## 2023-10-19 NOTE — PROGRESS NOTE ADULT - PROBLEM SELECTOR PLAN 8
isolated episode of n/v/d last PM  no further episodes  cont to monitor  unclear if medication related vs food  pt currently without complaints
isolated episode of n/v/d last PM  no further episodes  cont to monitor  unclear if medication related vs food  pt currently without complaints

## 2023-10-19 NOTE — PROGRESS NOTE ADULT - SUBJECTIVE AND OBJECTIVE BOX
[facility] Division of Hospital Medicine  Stacy Croft MD  Pager [phone]    Patient is a 52y old  Female who presents with a chief complaint of L eye vision loss       SUBJECTIVE / OVERNIGHT EVENTS: pt sitting up at edge of bed, facing window; seems down today; reports she is having a headache and her eyes are hurting; offered tylenol and some artifical tears to see if this helps; pt agrees; pt reports that she feels like the insulin is giving her diarrhea since she started the metformin; d/w pt will d/w endo for poss regimen change; currently no n/v/d      MEDICATIONS  (STANDING):  artificial tears (preservative free) Ophthalmic Solution 2 Drop(s) Both EYES two times a day  aspirin  chewable 81 milliGRAM(s) Oral daily  dextrose 5%. 1000 milliLiter(s) (50 mL/Hr) IV Continuous <Continuous>  dextrose 5%. 1000 milliLiter(s) (100 mL/Hr) IV Continuous <Continuous>  dextrose 50% Injectable 25 Gram(s) IV Push once  dextrose 50% Injectable 12.5 Gram(s) IV Push once  dextrose 50% Injectable 25 Gram(s) IV Push once  enoxaparin Injectable 40 milliGRAM(s) SubCutaneous every 24 hours  glucagon  Injectable 1 milliGRAM(s) IntraMuscular once  influenza   Vaccine 0.5 milliLiter(s) IntraMuscular once  insulin glargine Injectable (LANTUS) 14 Unit(s) SubCutaneous at bedtime  insulin lispro (ADMELOG) corrective regimen sliding scale   SubCutaneous at bedtime  insulin lispro (ADMELOG) corrective regimen sliding scale   SubCutaneous three times a day before meals  insulin lispro Injectable (ADMELOG) 4 Unit(s) SubCutaneous three times a day before meals  lactulose Syrup 10 Gram(s) Oral two times a day  lidocaine   4% Patch 1 Patch Transdermal daily  metFORMIN 500 milliGRAM(s) Oral two times a day  senna 2 Tablet(s) Oral at bedtime    MEDICATIONS  (PRN):  acetaminophen     Tablet .. 650 milliGRAM(s) Oral every 6 hours PRN Temp greater or equal to 38C (100.4F), Mild Pain (1 - 3)  cyclobenzaprine 5 milliGRAM(s) Oral three times a day PRN Muscle Spasm  dextrose Oral Gel 15 Gram(s) Oral once PRN Blood Glucose LESS THAN 70 milliGRAM(s)/deciliter  melatonin 3 milliGRAM(s) Oral at bedtime PRN Insomnia      CAPILLARY BLOOD GLUCOSE  POCT Blood Glucose.: 94 mg/dL (19 Oct 2023 08:35)  POCT Blood Glucose.: 114 mg/dL (18 Oct 2023 22:12)  POCT Blood Glucose.: 88 mg/dL (18 Oct 2023 17:15)  POCT Blood Glucose.: 182 mg/dL (18 Oct 2023 12:02)      PHYSICAL EXAM:  Vital Signs Last 24 Hrs  T(F): 98 (19 Oct 2023 05:01), Max: 98.3 (18 Oct 2023 13:01)  HR: 99 (19 Oct 2023 05:01) (89 - 99)  BP: 114/67 (19 Oct 2023 05:01) (114/67 - 150/88)  RR: 18 (19 Oct 2023 05:01) (18 - 18)  SpO2: 100% (19 Oct 2023 05:01) (100% - 100%)    Parameters below as of 19 Oct 2023 05:01  Patient On (Oxygen Delivery Method): room air        CONSTITUTIONAL: NAD, appears comfortable  EYES: PERRLA; conjunctiva and sclera clear  ENMT: Moist oral mucosa; normal dentition  RESPIRATORY: Normal respiratory effort  CARDIOVASCULAR:  No lower extremity edema; Peripheral pulses are 2+ bilaterally  ABDOMEN: Nontender to palpation, normoactive bowel soundsy  MUSCULOSKELETAL:  Normal gait; no clubbing or cyanosis of digits; no joint swelling or tenderness to palpation  PSYCH: A+O to person, place, and time; affect appropriate  NEUROLOGY: CN 3-12 are intact and symmetric; no gross sensory deficits   SKIN: No rashes; no palpable lesions    LABS:

## 2023-10-20 DIAGNOSIS — H57.11 OCULAR PAIN, RIGHT EYE: ICD-10-CM

## 2023-10-20 DIAGNOSIS — G44.209 TENSION-TYPE HEADACHE, UNSPECIFIED, NOT INTRACTABLE: ICD-10-CM

## 2023-10-20 LAB
CRP SERPL-MCNC: <3 MG/L — SIGNIFICANT CHANGE UP
CRP SERPL-MCNC: <3 MG/L — SIGNIFICANT CHANGE UP
ERYTHROCYTE [SEDIMENTATION RATE] IN BLOOD: 38 MM/HR — HIGH (ref 4–25)
ERYTHROCYTE [SEDIMENTATION RATE] IN BLOOD: 38 MM/HR — HIGH (ref 4–25)
GLUCOSE BLDC GLUCOMTR-MCNC: 143 MG/DL — HIGH (ref 70–99)
GLUCOSE BLDC GLUCOMTR-MCNC: 143 MG/DL — HIGH (ref 70–99)
GLUCOSE BLDC GLUCOMTR-MCNC: 174 MG/DL — HIGH (ref 70–99)
GLUCOSE BLDC GLUCOMTR-MCNC: 174 MG/DL — HIGH (ref 70–99)
GLUCOSE BLDC GLUCOMTR-MCNC: 192 MG/DL — HIGH (ref 70–99)
GLUCOSE BLDC GLUCOMTR-MCNC: 192 MG/DL — HIGH (ref 70–99)
GLUCOSE BLDC GLUCOMTR-MCNC: 197 MG/DL — HIGH (ref 70–99)
GLUCOSE BLDC GLUCOMTR-MCNC: 197 MG/DL — HIGH (ref 70–99)

## 2023-10-20 PROCEDURE — 70450 CT HEAD/BRAIN W/O DYE: CPT | Mod: 26

## 2023-10-20 PROCEDURE — 99233 SBSQ HOSP IP/OBS HIGH 50: CPT

## 2023-10-20 RX ORDER — TRAMADOL HYDROCHLORIDE 50 MG/1
25 TABLET ORAL ONCE
Refills: 0 | Status: DISCONTINUED | OUTPATIENT
Start: 2023-10-20 | End: 2023-10-20

## 2023-10-20 RX ORDER — ACETAMINOPHEN 500 MG
325 TABLET ORAL ONCE
Refills: 0 | Status: COMPLETED | OUTPATIENT
Start: 2023-10-20 | End: 2023-10-20

## 2023-10-20 RX ADMIN — Medication 2 DROP(S): at 18:18

## 2023-10-20 RX ADMIN — Medication 650 MILLIGRAM(S): at 04:50

## 2023-10-20 RX ADMIN — INSULIN GLARGINE 14 UNIT(S): 100 INJECTION, SOLUTION SUBCUTANEOUS at 22:40

## 2023-10-20 RX ADMIN — Medication 650 MILLIGRAM(S): at 04:19

## 2023-10-20 RX ADMIN — TRAMADOL HYDROCHLORIDE 25 MILLIGRAM(S): 50 TABLET ORAL at 19:23

## 2023-10-20 RX ADMIN — TRAMADOL HYDROCHLORIDE 25 MILLIGRAM(S): 50 TABLET ORAL at 18:23

## 2023-10-20 RX ADMIN — Medication 650 MILLIGRAM(S): at 11:03

## 2023-10-20 RX ADMIN — Medication 1: at 18:22

## 2023-10-20 RX ADMIN — LIDOCAINE 1 PATCH: 4 CREAM TOPICAL at 00:05

## 2023-10-20 RX ADMIN — METFORMIN HYDROCHLORIDE 500 MILLIGRAM(S): 850 TABLET ORAL at 11:03

## 2023-10-20 RX ADMIN — Medication 81 MILLIGRAM(S): at 12:22

## 2023-10-20 RX ADMIN — Medication 650 MILLIGRAM(S): at 12:00

## 2023-10-20 NOTE — PROGRESS NOTE ADULT - SUBJECTIVE AND OBJECTIVE BOX
[facility] DEPARTMENT OF OPHTHALMOLOGY  ------------------------------------------------------------------------------  Ronan Crawford MD, PGY3  Also available on [employer] Teams  ------------------------------------------------------------------------------    Interval History: Patient has been admitted in the hospital since 9/22. Last seen by ophthalmology team 9/25 for left eye vision loss. 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 Patient developed an excruciating headache on the right temple today and states that she was having some pain in the eye. Patient did not lose light perception in either eye at this time which is current baseline. Denies any jaw pain, no shoulder or hip pain, denies scalp tenderness. No eye redness. Headache has since subsided, but she did endorse that she was having temporal headache on the right side which hurt when palpated by patient. Day team jose a repeat ESR/CRP. Sed rate was 38 which is borderline elevated in this patient (normal expected <31). CRP is less than 3. No rash in the area either. Did have chickenpox as a child.     MEDICATIONS  (STANDING):  artificial tears (preservative free) Ophthalmic Solution 2 Drop(s) Both EYES two times a day  aspirin  chewable 81 milliGRAM(s) Oral daily  dextrose 5%. 1000 milliLiter(s) (100 mL/Hr) IV Continuous <Continuous>  dextrose 5%. 1000 milliLiter(s) (50 mL/Hr) IV Continuous <Continuous>  dextrose 50% Injectable 25 Gram(s) IV Push once  dextrose 50% Injectable 25 Gram(s) IV Push once  dextrose 50% Injectable 12.5 Gram(s) IV Push once  enoxaparin Injectable 40 milliGRAM(s) SubCutaneous every 24 hours  glucagon  Injectable 1 milliGRAM(s) IntraMuscular once  influenza   Vaccine 0.5 milliLiter(s) IntraMuscular once  insulin glargine Injectable (LANTUS) 14 Unit(s) SubCutaneous at bedtime  insulin lispro (ADMELOG) corrective regimen sliding scale   SubCutaneous at bedtime  insulin lispro (ADMELOG) corrective regimen sliding scale   SubCutaneous three times a day before meals  lactulose Syrup 10 Gram(s) Oral two times a day  lidocaine   4% Patch 1 Patch Transdermal daily  metFORMIN Extended-Release 500 milliGRAM(s) Oral daily  senna 2 Tablet(s) Oral at bedtime    MEDICATIONS  (PRN):  acetaminophen     Tablet .. 650 milliGRAM(s) Oral every 6 hours PRN Temp greater or equal to 38C (100.4F), Mild Pain (1 - 3)  cyclobenzaprine 5 milliGRAM(s) Oral three times a day PRN Muscle Spasm  dextrose Oral Gel 15 Gram(s) Oral once PRN Blood Glucose LESS THAN 70 milliGRAM(s)/deciliter  melatonin 3 milliGRAM(s) Oral at bedtime PRN Insomnia      VITALS: T(C): 37.1 (10-20-23 @ 20:56)  T(F): 98.7 (10-20-23 @ 20:56), Max: 98.7 (10-20-23 @ 20:56)  HR: 98 (10-20-23 @ 20:56) (93 - 107)  BP: 122/63 (10-20-23 @ 20:56) (122/63 - 155/73)  RR:  (18 - 18)  SpO2:  (98% - 99%)  Wt(kg): --  AAOx3    Ophthalmology Exam:  Visual acuity (sc): LP OD. HM OS  Pupils: poorly reactive OU, difficult to assess, possible RAPD OD.  Intraocular Pressure: Ttono 15 OD, 12 OS  Extraocular movements (EOMs): Full OU, no pain, no diplopia  Confrontational Visual Field (CVF): DOLLY 2/2 VA  Temporal pulses palpated, patent, slightly weaker on right side than left, no tenderness to palpation at this time.    Pen Light Exam (PLE)  External: Flat OU.  Lids/Lashes/Lacrimal Ducts: Flat OU.   Sclera/Conjunctiva: White and quiet OU.  Cornea: DTBUT OU  Anterior Chamber: Deep and formed OU.  no edema appreciated  Iris: Flat OU. no NVI appreciate  Lens: PCIOL OU.

## 2023-10-20 NOTE — CHART NOTE - NSCHARTNOTEFT_GEN_A_CORE
Opthalmology re-consulted for further evaluation of right eye pain. Will f/u recommendations    Brody Gupta PA-C  pager [phone]

## 2023-10-20 NOTE — PROVIDER CONTACT NOTE (OTHER) - RECOMMENDATIONS
As per provider Roberto Erickson, repeat fingerstick and administer insulin dose as prescribed. RN will continue to monitor.
Will check FS before pt goes to bed
As per provider Roberto Erickson, hold insulin administration for lunch time. RN will continue to monitor.
Notify PA
providers came to assess patient and tylenol will be given. CT of head will also be ordered to assess pt
check blood glucose, give prn Tylenol for headache
Adjust pre meal dose of insulin.

## 2023-10-20 NOTE — CHART NOTE - NSCHARTNOTEFT_GEN_A_CORE
[facility] MEDICINE NIGHT COVERAGE    Notified by RN pt c/o headache. Pt seen and examined at bedside, AOx4. Pt reports having "excruciating" headache of R temple radiating to R eye with sudden onset 10 minutes ago. Pt rates pain 10/10. Pt denies h/o similar episodes in the past. Denies fall, head trauma, dizziness, numbness/tingling, weakness. 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     Vital Signs Last 24 Hrs  T(C): 36.9 (20 Oct 2023 04:10), Max: 36.9 (20 Oct 2023 04:10)  T(F): 98.5 (20 Oct 2023 04:10), Max: 98.5 (20 Oct 2023 04:10)  HR: 107 (20 Oct 2023 04:10) (98 - 107)  BP: 147/88 (20 Oct 2023 04:10) (114/67 - 147/88)  BP(mean): --  RR: 18 (20 Oct 2023 04:10) (18 - 18)  SpO2: 99% (20 Oct 2023 04:10) (97% - 100%)    PHYSICAL EXAM:  General: Awake and alert.   Head: Normocephalic, atraumatic. No TTP, erythema, ecchymosis or swelling of scalp.   Eyes: No scleral icterus. No conjunctival pallor.  Neck: Supple. Full range of motion.  Extremities: No cyanosis.   Neuro: A&Ox4. CN II-XII grossly intact. 5/5 motor strength in UE and LE b/l. Tactile sensation intact in UE and LE b/l.     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 Pt now c/o R-sided headache.     Plan:  - Check STAT ESR and CRP  - CT head w/o contrast ordered and expedited  - Tylenol PRN for headache  - Fall precautions  - Reconsult ophthalmology in AM    Will continue to monitor closely. RN made aware.     LUIS De Leon-C  Medicine [id number] [facility] MEDICINE NIGHT COVERAGE    Notified by RN pt c/o headache. Pt seen and examined at bedside, AOx4. Pt reports having "excruciating" headache of R temple radiating to R eye with sudden onset 10 minutes ago. Pt rates pain 10/10. Pt denies h/o similar episodes in the past. Denies fall, head trauma, dizziness, numbness/tingling, weakness. 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     Vital Signs Last 24 Hrs  T(C): 36.9 (20 Oct 2023 04:10), Max: 36.9 (20 Oct 2023 04:10)  T(F): 98.5 (20 Oct 2023 04:10), Max: 98.5 (20 Oct 2023 04:10)  HR: 107 (20 Oct 2023 04:10) (98 - 107)  BP: 147/88 (20 Oct 2023 04:10) (114/67 - 147/88)  BP(mean): --  RR: 18 (20 Oct 2023 04:10) (18 - 18)  SpO2: 99% (20 Oct 2023 04:10) (97% - 100%)    PHYSICAL EXAM:  General: Awake and alert.   Head: Normocephalic, atraumatic. No TTP, erythema, ecchymosis or swelling of scalp.   Eyes: No scleral icterus. No conjunctival pallor.  Neck: Supple. Full range of motion.  Extremities: No cyanosis.   Neuro: A&Ox4. CN II-XII grossly intact. 5/5 motor strength in UE and LE b/l. Tactile sensation intact in UE and LE b/l.     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 Pt now c/o R-sided headache.     Plan:  - Check STAT ESR and CRP  - CT head w/o contrast ordered and expedited  - Tylenol PRN for headache  - Fall precautions  - Reconsult ophthalmology in AM    Will continue to monitor closely. RN made aware.     MARC De LeonC  Medicine [id number]    Addendum:   Preliminary report for CT head: No acute intracranial pathology. [facility] MEDICINE NIGHT COVERAGE    Notified by RN pt c/o headache. Pt seen and examined at bedside, AOx4. Pt reports having "excruciating" headache of R temple radiating to R eye with sudden onset 10 minutes ago. Pt rates pain 10/10. Pt denies h/o similar episodes in the past. Denies fall, head trauma, neck pain, dizziness, numbness/tingling, weakness. 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     Vital Signs Last 24 Hrs  T(C): 36.9 (20 Oct 2023 04:10), Max: 36.9 (20 Oct 2023 04:10)  T(F): 98.5 (20 Oct 2023 04:10), Max: 98.5 (20 Oct 2023 04:10)  HR: 107 (20 Oct 2023 04:10) (98 - 107)  BP: 147/88 (20 Oct 2023 04:10) (114/67 - 147/88)  BP(mean): --  RR: 18 (20 Oct 2023 04:10) (18 - 18)  SpO2: 99% (20 Oct 2023 04:10) (97% - 100%)    PHYSICAL EXAM:  General: Awake and alert.   Head: Normocephalic, atraumatic. No TTP, erythema, ecchymosis or swelling of scalp.   Eyes: No scleral icterus. No conjunctival pallor.  Neck: Supple. Full range of motion.  Extremities: No cyanosis.   Neuro: A&Ox4. CN II-XII grossly intact. 5/5 motor strength in UE and LE b/l. Tactile sensation intact in UE and LE b/l.     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 Pt now c/o R-sided headache.     Plan:  - Check STAT ESR and CRP  - CT head w/o contrast ordered and expedited  - Tylenol PRN for headache  - Fall precautions  - Reconsult ophthalmology in AM    Will continue to monitor closely. RN made aware.     MARC De LeonC  Medicine [id number]    Addendum:   Preliminary report for CT head: No acute intracranial pathology.  Case discussed with hospitalist, Dr. Noe Adams.

## 2023-10-20 NOTE — PROGRESS NOTE ADULT - ASSESSMENT
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 Now with an episode of right sided temporal headache with borderline ESR, normal CRP. No temporal tenderness on exam today, pulse is patent, though slightly weaker on right than left side. No loss of LP vision (difficult to ascertain vision changes 2/2 already LP status of vision but did not go black). Low suspicion of temporal arteritis at this time.    #Temporal headache and right eye pain, OU  - patient with temporal headache 10/10 subjectively which has since subsided  - urgent ESR/CRP was sent by primary team; ESR borderline elevated, primary team agrees low suspicion for TA  - temporal pulses are patent, though slightly weaker on right side, no pain to palpation on exam this evening  - no jaw pain, no shoulder or hip pain, no scalp tenderness  - no vesicular lesions to suggest development of shingles though did have chickenpox as a child  - no hyphema, no NVI, IOP is within normal limits, no injection  - no overt evidence of neovascular glaucoma or neovascularization of the iris on exam  - low suspicion for GCA, can consider rheum consult given borderline ESR and consistent headache, however low suspicion from ophthalmic standpoint for GCA - decreased vision tied to TRD and PDR  - start artificial tears 4x a day OU in order to treat dry eye, may contribute to headaches (though severity is disproportionate based on description)  - reach out to ophthalmology with any acute changes    #PDR OU  #TRD OU  #Vitreous hemorrhage OS  - Pt's vision in the left eye worsened from 20/70 in August 2023 when last seen at [facility] to  in the ED  - On exam, pt was found to have vit heme in the left eye along with tractional RD and dense fibrosis  - Possible that pt's RD progressed to involve the fovea, resulting in worsening of vision along with the vit heme worsening her vision  - No acute ophthalmologic intervention at this time  - Remainder of management per primary team  - Should follow up with retina specialist upon discharge

## 2023-10-20 NOTE — PROGRESS NOTE ADULT - SUBJECTIVE AND OBJECTIVE BOX
[facility] Division of Hospital Medicine  Stacy Croft MD  Pager [phone]    Patient is a 52y old  Female who presents with a chief complaint of L eye vision loss       SUBJECTIVE / OVERNIGHT EVENTS: PM events noted; pt reports heavy headache over night that she never had before only on the R side and felt it in her eye; upon my eval pt reports HA is mostly resolved; no tenderness at temple      MEDICATIONS  (STANDING):  artificial tears (preservative free) Ophthalmic Solution 2 Drop(s) Both EYES two times a day  aspirin  chewable 81 milliGRAM(s) Oral daily  dextrose 5%. 1000 milliLiter(s) (100 mL/Hr) IV Continuous <Continuous>  dextrose 5%. 1000 milliLiter(s) (50 mL/Hr) IV Continuous <Continuous>  dextrose 50% Injectable 25 Gram(s) IV Push once  dextrose 50% Injectable 25 Gram(s) IV Push once  dextrose 50% Injectable 12.5 Gram(s) IV Push once  enoxaparin Injectable 40 milliGRAM(s) SubCutaneous every 24 hours  glucagon  Injectable 1 milliGRAM(s) IntraMuscular once  influenza   Vaccine 0.5 milliLiter(s) IntraMuscular once  insulin glargine Injectable (LANTUS) 14 Unit(s) SubCutaneous at bedtime  insulin lispro (ADMELOG) corrective regimen sliding scale   SubCutaneous three times a day before meals  insulin lispro (ADMELOG) corrective regimen sliding scale   SubCutaneous at bedtime  lactulose Syrup 10 Gram(s) Oral two times a day  lidocaine   4% Patch 1 Patch Transdermal daily  metFORMIN Extended-Release 500 milliGRAM(s) Oral daily  senna 2 Tablet(s) Oral at bedtime    MEDICATIONS  (PRN):  acetaminophen     Tablet .. 650 milliGRAM(s) Oral every 6 hours PRN Temp greater or equal to 38C (100.4F), Mild Pain (1 - 3)  cyclobenzaprine 5 milliGRAM(s) Oral three times a day PRN Muscle Spasm  dextrose Oral Gel 15 Gram(s) Oral once PRN Blood Glucose LESS THAN 70 milliGRAM(s)/deciliter  melatonin 3 milliGRAM(s) Oral at bedtime PRN Insomnia      CAPILLARY BLOOD GLUCOSE  POCT Blood Glucose.: 197 mg/dL (20 Oct 2023 11:51)  POCT Blood Glucose.: 143 mg/dL (20 Oct 2023 08:42)  POCT Blood Glucose.: 139 mg/dL (19 Oct 2023 22:09)  POCT Blood Glucose.: 126 mg/dL (19 Oct 2023 17:30)      PHYSICAL EXAM:  Vital Signs Last 24 Hrs  T(F): 97.3 (20 Oct 2023 12:28), Max: 98.5 (20 Oct 2023 04:10)  HR: 93 (20 Oct 2023 12:28) (93 - 107)  BP: 155/73 (20 Oct 2023 12:28) (135/82 - 155/73)  RR: 18 (20 Oct 2023 12:28) (18 - 18)  SpO2: 98% (20 Oct 2023 12:28) (97% - 99%)    Parameters below as of 20 Oct 2023 12:28  Patient On (Oxygen Delivery Method): room air        CONSTITUTIONAL: NAD, appears comfortable  EYES: PERRLA; conjunctiva and sclera clear  HEAD: non tender temples b/l  ENMT: Moist oral mucosa; normal dentition  RESPIRATORY: Normal respiratory effort; lungs are clear to auscultation bilaterally  CARDIOVASCULAR: No lower extremity edema  MUSCULOSKELETAL:  Normal gait; no clubbing or cyanosis of digits; no joint swelling or tenderness to palpation  PSYCH: A+O to person, place, and time; affect appropriate  NEUROLOGY: CN 3-12 are intact and symmetric; no gross sensory deficits   SKIN: No rashes; no palpable lesions    LABS:

## 2023-10-21 LAB
ANION GAP SERPL CALC-SCNC: 9 MMOL/L — SIGNIFICANT CHANGE UP (ref 7–14)
ANION GAP SERPL CALC-SCNC: 9 MMOL/L — SIGNIFICANT CHANGE UP (ref 7–14)
BUN SERPL-MCNC: 14 MG/DL — SIGNIFICANT CHANGE UP (ref 7–23)
BUN SERPL-MCNC: 14 MG/DL — SIGNIFICANT CHANGE UP (ref 7–23)
CALCIUM SERPL-MCNC: 9.6 MG/DL — SIGNIFICANT CHANGE UP (ref 8.4–10.5)
CALCIUM SERPL-MCNC: 9.6 MG/DL — SIGNIFICANT CHANGE UP (ref 8.4–10.5)
CHLORIDE SERPL-SCNC: 102 MMOL/L — SIGNIFICANT CHANGE UP (ref 98–107)
CHLORIDE SERPL-SCNC: 102 MMOL/L — SIGNIFICANT CHANGE UP (ref 98–107)
CO2 SERPL-SCNC: 27 MMOL/L — SIGNIFICANT CHANGE UP (ref 22–31)
CO2 SERPL-SCNC: 27 MMOL/L — SIGNIFICANT CHANGE UP (ref 22–31)
CREAT SERPL-MCNC: 0.82 MG/DL — SIGNIFICANT CHANGE UP (ref 0.5–1.3)
CREAT SERPL-MCNC: 0.82 MG/DL — SIGNIFICANT CHANGE UP (ref 0.5–1.3)
EGFR: 86 ML/MIN/1.73M2 — SIGNIFICANT CHANGE UP
EGFR: 86 ML/MIN/1.73M2 — SIGNIFICANT CHANGE UP
GLUCOSE BLDC GLUCOMTR-MCNC: 131 MG/DL — HIGH (ref 70–99)
GLUCOSE BLDC GLUCOMTR-MCNC: 131 MG/DL — HIGH (ref 70–99)
GLUCOSE BLDC GLUCOMTR-MCNC: 132 MG/DL — HIGH (ref 70–99)
GLUCOSE BLDC GLUCOMTR-MCNC: 132 MG/DL — HIGH (ref 70–99)
GLUCOSE BLDC GLUCOMTR-MCNC: 141 MG/DL — HIGH (ref 70–99)
GLUCOSE BLDC GLUCOMTR-MCNC: 141 MG/DL — HIGH (ref 70–99)
GLUCOSE BLDC GLUCOMTR-MCNC: 160 MG/DL — HIGH (ref 70–99)
GLUCOSE BLDC GLUCOMTR-MCNC: 160 MG/DL — HIGH (ref 70–99)
GLUCOSE SERPL-MCNC: 146 MG/DL — HIGH (ref 70–99)
GLUCOSE SERPL-MCNC: 146 MG/DL — HIGH (ref 70–99)
HCT VFR BLD CALC: 34.5 % — SIGNIFICANT CHANGE UP (ref 34.5–45)
HCT VFR BLD CALC: 34.5 % — SIGNIFICANT CHANGE UP (ref 34.5–45)
HGB BLD-MCNC: 10.8 G/DL — LOW (ref 11.5–15.5)
HGB BLD-MCNC: 10.8 G/DL — LOW (ref 11.5–15.5)
MAGNESIUM SERPL-MCNC: 2.1 MG/DL — SIGNIFICANT CHANGE UP (ref 1.6–2.6)
MAGNESIUM SERPL-MCNC: 2.1 MG/DL — SIGNIFICANT CHANGE UP (ref 1.6–2.6)
MCHC RBC-ENTMCNC: 26.4 PG — LOW (ref 27–34)
MCHC RBC-ENTMCNC: 26.4 PG — LOW (ref 27–34)
MCHC RBC-ENTMCNC: 31.3 GM/DL — LOW (ref 32–36)
MCHC RBC-ENTMCNC: 31.3 GM/DL — LOW (ref 32–36)
MCV RBC AUTO: 84.4 FL — SIGNIFICANT CHANGE UP (ref 80–100)
MCV RBC AUTO: 84.4 FL — SIGNIFICANT CHANGE UP (ref 80–100)
NRBC # BLD: 0 /100 WBCS — SIGNIFICANT CHANGE UP (ref 0–0)
NRBC # BLD: 0 /100 WBCS — SIGNIFICANT CHANGE UP (ref 0–0)
NRBC # FLD: 0 K/UL — SIGNIFICANT CHANGE UP (ref 0–0)
NRBC # FLD: 0 K/UL — SIGNIFICANT CHANGE UP (ref 0–0)
PHOSPHATE SERPL-MCNC: 3.2 MG/DL — SIGNIFICANT CHANGE UP (ref 2.5–4.5)
PHOSPHATE SERPL-MCNC: 3.2 MG/DL — SIGNIFICANT CHANGE UP (ref 2.5–4.5)
PLATELET # BLD AUTO: 198 K/UL — SIGNIFICANT CHANGE UP (ref 150–400)
PLATELET # BLD AUTO: 198 K/UL — SIGNIFICANT CHANGE UP (ref 150–400)
POTASSIUM SERPL-MCNC: 4.3 MMOL/L — SIGNIFICANT CHANGE UP (ref 3.5–5.3)
POTASSIUM SERPL-MCNC: 4.3 MMOL/L — SIGNIFICANT CHANGE UP (ref 3.5–5.3)
POTASSIUM SERPL-SCNC: 4.3 MMOL/L — SIGNIFICANT CHANGE UP (ref 3.5–5.3)
POTASSIUM SERPL-SCNC: 4.3 MMOL/L — SIGNIFICANT CHANGE UP (ref 3.5–5.3)
RBC # BLD: 4.09 M/UL — SIGNIFICANT CHANGE UP (ref 3.8–5.2)
RBC # BLD: 4.09 M/UL — SIGNIFICANT CHANGE UP (ref 3.8–5.2)
RBC # FLD: 13.4 % — SIGNIFICANT CHANGE UP (ref 10.3–14.5)
RBC # FLD: 13.4 % — SIGNIFICANT CHANGE UP (ref 10.3–14.5)
SODIUM SERPL-SCNC: 138 MMOL/L — SIGNIFICANT CHANGE UP (ref 135–145)
SODIUM SERPL-SCNC: 138 MMOL/L — SIGNIFICANT CHANGE UP (ref 135–145)
WBC # BLD: 5.89 K/UL — SIGNIFICANT CHANGE UP (ref 3.8–10.5)
WBC # BLD: 5.89 K/UL — SIGNIFICANT CHANGE UP (ref 3.8–10.5)
WBC # FLD AUTO: 5.89 K/UL — SIGNIFICANT CHANGE UP (ref 3.8–10.5)
WBC # FLD AUTO: 5.89 K/UL — SIGNIFICANT CHANGE UP (ref 3.8–10.5)

## 2023-10-21 PROCEDURE — 99232 SBSQ HOSP IP/OBS MODERATE 35: CPT

## 2023-10-21 RX ORDER — TRAMADOL HYDROCHLORIDE 50 MG/1
25 TABLET ORAL ONCE
Refills: 0 | Status: DISCONTINUED | OUTPATIENT
Start: 2023-10-21 | End: 2023-10-21

## 2023-10-21 RX ORDER — TRAMADOL HYDROCHLORIDE 50 MG/1
25 TABLET ORAL EVERY 6 HOURS
Refills: 0 | Status: DISCONTINUED | OUTPATIENT
Start: 2023-10-21 | End: 2023-10-26

## 2023-10-21 RX ADMIN — METFORMIN HYDROCHLORIDE 500 MILLIGRAM(S): 850 TABLET ORAL at 12:44

## 2023-10-21 RX ADMIN — TRAMADOL HYDROCHLORIDE 25 MILLIGRAM(S): 50 TABLET ORAL at 01:50

## 2023-10-21 RX ADMIN — TRAMADOL HYDROCHLORIDE 25 MILLIGRAM(S): 50 TABLET ORAL at 01:20

## 2023-10-21 RX ADMIN — Medication 81 MILLIGRAM(S): at 12:44

## 2023-10-21 RX ADMIN — INSULIN GLARGINE 14 UNIT(S): 100 INJECTION, SOLUTION SUBCUTANEOUS at 22:19

## 2023-10-21 NOTE — PROGRESS NOTE ADULT - SUBJECTIVE AND OBJECTIVE BOX
[facility] Division of Hospital Medicine  Hermilakelvin Sudarshan HUA  Pager [phone]  Available via MS Teams    SUBJECTIVE / OVERNIGHT EVENTS: Patient states that headaches is significantly improved. She denies any chest pain, fevers or chills.     ADDITIONAL REVIEW OF SYSTEMS:    MEDICATIONS  (STANDING):  artificial tears (preservative free) Ophthalmic Solution 2 Drop(s) Both EYES four times a day  aspirin  chewable 81 milliGRAM(s) Oral daily  dextrose 5%. 1000 milliLiter(s) (100 mL/Hr) IV Continuous <Continuous>  dextrose 5%. 1000 milliLiter(s) (50 mL/Hr) IV Continuous <Continuous>  dextrose 50% Injectable 25 Gram(s) IV Push once  dextrose 50% Injectable 12.5 Gram(s) IV Push once  dextrose 50% Injectable 25 Gram(s) IV Push once  enoxaparin Injectable 40 milliGRAM(s) SubCutaneous every 24 hours  glucagon  Injectable 1 milliGRAM(s) IntraMuscular once  influenza   Vaccine 0.5 milliLiter(s) IntraMuscular once  insulin glargine Injectable (LANTUS) 14 Unit(s) SubCutaneous at bedtime  insulin lispro (ADMELOG) corrective regimen sliding scale   SubCutaneous three times a day before meals  insulin lispro (ADMELOG) corrective regimen sliding scale   SubCutaneous at bedtime  lactulose Syrup 10 Gram(s) Oral two times a day  lidocaine   4% Patch 1 Patch Transdermal daily  metFORMIN Extended-Release 500 milliGRAM(s) Oral daily  senna 2 Tablet(s) Oral at bedtime    MEDICATIONS  (PRN):  acetaminophen     Tablet .. 650 milliGRAM(s) Oral every 6 hours PRN Temp greater or equal to 38C (100.4F), Mild Pain (1 - 3)  cyclobenzaprine 5 milliGRAM(s) Oral three times a day PRN Muscle Spasm  dextrose Oral Gel 15 Gram(s) Oral once PRN Blood Glucose LESS THAN 70 milliGRAM(s)/deciliter  melatonin 3 milliGRAM(s) Oral at bedtime PRN Insomnia      I&O's Summary      PHYSICAL EXAM:  Vital Signs Last 24 Hrs  T(C): 36.8 (21 Oct 2023 13:21), Max: 37.1 (20 Oct 2023 20:56)  T(F): 98.3 (21 Oct 2023 13:21), Max: 98.7 (20 Oct 2023 20:56)  HR: 98 (21 Oct 2023 13:21) (93 - 98)  BP: 131/85 (21 Oct 2023 13:21) (122/63 - 144/68)  BP(mean): --  RR: 18 (21 Oct 2023 13:21) (18 - 18)  SpO2: 99% (21 Oct 2023 13:21) (98% - 100%)    Parameters below as of 21 Oct 2023 13:21  Patient On (Oxygen Delivery Method): room air      CONSTITUTIONAL: NAD, well-developed, well-groomed  RESPIRATORY: Normal respiratory effort; lungs are clear to auscultation bilaterally  CARDIOVASCULAR: Regular rate and rhythm, normal S1 and S2, no murmur/rub/gallop; No lower extremity edema  ABDOMEN: Nontender to palpation, normoactive bowel sounds, no rebound/guarding; No hepatosplenomegaly  MUSCULOSKELETAL: no clubbing or cyanosis of digits; no joint swelling or tenderness to palpation  PSYCH: A+O to person, place, and time; affect appropriate  NEUROLOGY: CN 2-12 are intact and symmetric; no gross sensory deficits       LABS:                        10.8   5.89  )-----------( 198      ( 21 Oct 2023 06:09 )             34.5     10-21    138  |  102  |  14  ----------------------------<  146<H>  4.3   |  27  |  0.82    Ca    9.6      21 Oct 2023 06:09  Phos  3.2     10-21  Mg     2.10     10-21            Urinalysis Basic - ( 21 Oct 2023 06:09 )    Color: x / Appearance: x / SG: x / pH: x  Gluc: 146 mg/dL / Ketone: x  / Bili: x / Urobili: x   Blood: x / Protein: x / Nitrite: x   Leuk Esterase: x / RBC: x / WBC x   Sq Epi: x / Non Sq Epi: x / Bacteria: x        COVID-19 PCR: NotDetec (11 Oct 2023 17:00)  SARS-CoV-2: NotDetec (27 Sep 2023 09:24)      RADIOLOGY & ADDITIONAL TESTS:  New Results Reviewed Today: yes   New Imaging Personally Reviewed Today: yes      160.02

## 2023-10-22 LAB
GLUCOSE BLDC GLUCOMTR-MCNC: 120 MG/DL — HIGH (ref 70–99)
GLUCOSE BLDC GLUCOMTR-MCNC: 120 MG/DL — HIGH (ref 70–99)
GLUCOSE BLDC GLUCOMTR-MCNC: 151 MG/DL — HIGH (ref 70–99)
GLUCOSE BLDC GLUCOMTR-MCNC: 151 MG/DL — HIGH (ref 70–99)
GLUCOSE BLDC GLUCOMTR-MCNC: 158 MG/DL — HIGH (ref 70–99)
GLUCOSE BLDC GLUCOMTR-MCNC: 158 MG/DL — HIGH (ref 70–99)
GLUCOSE BLDC GLUCOMTR-MCNC: 160 MG/DL — HIGH (ref 70–99)
GLUCOSE BLDC GLUCOMTR-MCNC: 160 MG/DL — HIGH (ref 70–99)
GLUCOSE BLDC GLUCOMTR-MCNC: 171 MG/DL — HIGH (ref 70–99)
GLUCOSE BLDC GLUCOMTR-MCNC: 171 MG/DL — HIGH (ref 70–99)

## 2023-10-22 PROCEDURE — 99232 SBSQ HOSP IP/OBS MODERATE 35: CPT

## 2023-10-22 RX ADMIN — LIDOCAINE 1 PATCH: 4 CREAM TOPICAL at 12:24

## 2023-10-22 RX ADMIN — Medication 1: at 12:23

## 2023-10-22 RX ADMIN — LIDOCAINE 1 PATCH: 4 CREAM TOPICAL at 20:55

## 2023-10-22 RX ADMIN — INSULIN GLARGINE 14 UNIT(S): 100 INJECTION, SOLUTION SUBCUTANEOUS at 22:26

## 2023-10-22 RX ADMIN — Medication 81 MILLIGRAM(S): at 12:28

## 2023-10-22 RX ADMIN — METFORMIN HYDROCHLORIDE 500 MILLIGRAM(S): 850 TABLET ORAL at 13:06

## 2023-10-22 NOTE — PROGRESS NOTE ADULT - SUBJECTIVE AND OBJECTIVE BOX
[facility] Division of Hospital Medicine  FREDDIEchristiana Sudarshan HUA  Pager [phone]  Available via MS Teams    SUBJECTIVE / OVERNIGHT EVENTS: No acute events overnights. Denies any headaches this AM.     ADDITIONAL REVIEW OF SYSTEMS:    MEDICATIONS  (STANDING):  artificial tears (preservative free) Ophthalmic Solution 2 Drop(s) Both EYES four times a day  aspirin  chewable 81 milliGRAM(s) Oral daily  dextrose 5%. 1000 milliLiter(s) (50 mL/Hr) IV Continuous <Continuous>  dextrose 5%. 1000 milliLiter(s) (100 mL/Hr) IV Continuous <Continuous>  dextrose 50% Injectable 25 Gram(s) IV Push once  dextrose 50% Injectable 25 Gram(s) IV Push once  dextrose 50% Injectable 12.5 Gram(s) IV Push once  enoxaparin Injectable 40 milliGRAM(s) SubCutaneous every 24 hours  glucagon  Injectable 1 milliGRAM(s) IntraMuscular once  influenza   Vaccine 0.5 milliLiter(s) IntraMuscular once  insulin glargine Injectable (LANTUS) 14 Unit(s) SubCutaneous at bedtime  insulin lispro (ADMELOG) corrective regimen sliding scale   SubCutaneous at bedtime  insulin lispro (ADMELOG) corrective regimen sliding scale   SubCutaneous three times a day before meals  lactulose Syrup 10 Gram(s) Oral two times a day  lidocaine   4% Patch 1 Patch Transdermal daily  metFORMIN Extended-Release 500 milliGRAM(s) Oral daily  senna 2 Tablet(s) Oral at bedtime    MEDICATIONS  (PRN):  acetaminophen     Tablet .. 650 milliGRAM(s) Oral every 6 hours PRN Temp greater or equal to 38C (100.4F), Mild Pain (1 - 3)  cyclobenzaprine 5 milliGRAM(s) Oral three times a day PRN Muscle Spasm  dextrose Oral Gel 15 Gram(s) Oral once PRN Blood Glucose LESS THAN 70 milliGRAM(s)/deciliter  melatonin 3 milliGRAM(s) Oral at bedtime PRN Insomnia  traMADol 25 milliGRAM(s) Oral every 6 hours PRN Severe Pain (7 - 10)      I&O's Summary      PHYSICAL EXAM:  Vital Signs Last 24 Hrs  T(C): 36.6 (22 Oct 2023 05:58), Max: 36.9 (21 Oct 2023 21:00)  T(F): 97.9 (22 Oct 2023 05:58), Max: 98.5 (21 Oct 2023 21:00)  HR: 86 (22 Oct 2023 05:58) (86 - 98)  BP: 120/66 (22 Oct 2023 05:58) (120/66 - 132/76)  BP(mean): --  RR: 18 (22 Oct 2023 05:58) (18 - 18)  SpO2: 97% (22 Oct 2023 05:58) (97% - 100%)    Parameters below as of 22 Oct 2023 05:58  Patient On (Oxygen Delivery Method): room air      CONSTITUTIONAL: NAD, well-developed, well-groomed  RESPIRATORY: Normal respiratory effort; lungs are clear to auscultation bilaterally  CARDIOVASCULAR: Regular rate and rhythm, normal S1 and S2, no murmur/rub/gallop  ABDOMEN: Nontender to palpation, normoactive bowel sounds, no rebound/guarding; No hepatosplenomegaly  NEUROLOGY: moves all extremities  SKIN: No rashes; no palpable lesions    LABS:                        10.8   5.89  )-----------( 198      ( 21 Oct 2023 06:09 )             34.5     10-21    138  |  102  |  14  ----------------------------<  146<H>  4.3   |  27  |  0.82    Ca    9.6      21 Oct 2023 06:09  Phos  3.2     10-21  Mg     2.10     10-21            Urinalysis Basic - ( 21 Oct 2023 06:09 )    Color: x / Appearance: x / SG: x / pH: x  Gluc: 146 mg/dL / Ketone: x  / Bili: x / Urobili: x   Blood: x / Protein: x / Nitrite: x   Leuk Esterase: x / RBC: x / WBC x   Sq Epi: x / Non Sq Epi: x / Bacteria: x        COVID-19 PCR: NotDetec (11 Oct 2023 17:00)  SARS-CoV-2: NotDetec (27 Sep 2023 09:24)      RADIOLOGY & ADDITIONAL TESTS:  New Results Reviewed Today: yes   New Imaging Personally Reviewed Today: yes

## 2023-10-23 LAB
GLUCOSE BLDC GLUCOMTR-MCNC: 134 MG/DL — HIGH (ref 70–99)
GLUCOSE BLDC GLUCOMTR-MCNC: 134 MG/DL — HIGH (ref 70–99)
GLUCOSE BLDC GLUCOMTR-MCNC: 138 MG/DL — HIGH (ref 70–99)
GLUCOSE BLDC GLUCOMTR-MCNC: 138 MG/DL — HIGH (ref 70–99)
GLUCOSE BLDC GLUCOMTR-MCNC: 140 MG/DL — HIGH (ref 70–99)
GLUCOSE BLDC GLUCOMTR-MCNC: 140 MG/DL — HIGH (ref 70–99)
GLUCOSE BLDC GLUCOMTR-MCNC: 210 MG/DL — HIGH (ref 70–99)
GLUCOSE BLDC GLUCOMTR-MCNC: 210 MG/DL — HIGH (ref 70–99)

## 2023-10-23 PROCEDURE — 99232 SBSQ HOSP IP/OBS MODERATE 35: CPT

## 2023-10-23 RX ADMIN — Medication 650 MILLIGRAM(S): at 01:00

## 2023-10-23 RX ADMIN — LIDOCAINE 1 PATCH: 4 CREAM TOPICAL at 19:22

## 2023-10-23 RX ADMIN — METFORMIN HYDROCHLORIDE 500 MILLIGRAM(S): 850 TABLET ORAL at 13:50

## 2023-10-23 RX ADMIN — LIDOCAINE 1 PATCH: 4 CREAM TOPICAL at 12:48

## 2023-10-23 RX ADMIN — Medication 2: at 12:48

## 2023-10-23 RX ADMIN — TRAMADOL HYDROCHLORIDE 25 MILLIGRAM(S): 50 TABLET ORAL at 07:56

## 2023-10-23 RX ADMIN — INSULIN GLARGINE 14 UNIT(S): 100 INJECTION, SOLUTION SUBCUTANEOUS at 22:31

## 2023-10-23 RX ADMIN — TRAMADOL HYDROCHLORIDE 25 MILLIGRAM(S): 50 TABLET ORAL at 08:56

## 2023-10-23 RX ADMIN — Medication 650 MILLIGRAM(S): at 00:24

## 2023-10-23 RX ADMIN — Medication 81 MILLIGRAM(S): at 12:51

## 2023-10-23 RX ADMIN — LIDOCAINE 1 PATCH: 4 CREAM TOPICAL at 03:18

## 2023-10-23 RX ADMIN — Medication 650 MILLIGRAM(S): at 19:48

## 2023-10-23 RX ADMIN — Medication 650 MILLIGRAM(S): at 19:08

## 2023-10-23 NOTE — PROVIDER CONTACT NOTE (OTHER) - ASSESSMENT
patient complained of stomach pain and immediately had an episode of emesis (200ml) after emesis patient stated she does not have stomach pain at this moment. Patient was recently started on metformin
patient stated " I feel like my sugar is low". Patient stated she knows when her sugar is dropping. patient stated she is having a mild headache
pt tachy 107 hr due to pain and all other vitals are stable, pt crying and holding head due to pain
Pt AOx4, walked off unit when asked to come back onto unit became agitated and annoyed. Pt started standing in doorway and wouldn't go back into room, explained we cannot have her walking off the unit and became more annoyed.
Patient is A&Ox4, Patient denies pain, chest pain, shortness of breath, nausea, vomiting or dizziness.
Pt AOx4, visually impaired, having pressure behind the eyes, new tingling in B/L feet and not feeling right. States she has had a previous stroke and was concerned.
Patient is A&Ox4, Patient denies pain, chest pain, shortness of breath, nausea, vomiting or dizziness.
Pt A+Ox4. OOB standby assist. VS stable. No acute s/s of distress.

## 2023-10-23 NOTE — PROVIDER CONTACT NOTE (OTHER) - BACKGROUND
52 year old female admitted for retinal detachment of left eye
Pt admitted for vision loss, PMHx DM2, HLD, and right eye blindness.
52 year old female admitted for serous retinal detachment of left eye
Pt admitted with serious retinal detachment of left eye.
52 year old female admitted for serous retinal detachment of left eye
Pt admitted for vision loss, PMHx DM2, HLD, and right eye blindness.
pt admitted for serous retinal detachment of left eye
Pt admitted for serous retinal detachment of L eye.

## 2023-10-23 NOTE — PROVIDER CONTACT NOTE (OTHER) - REASON
Patient's blood glucose is 287, insulin administered per sliding scale and pre meal dose for breakfast.
Pt refusing pre meal insulin
patient complained of stomach pain and immediately had an episode of emesis (200ml) after emesis patient stated she does not have stomach pain
Pt refusing to take premeal insulin pt is going to eat dinner
Pt c/o pressure behind eyes, tingling in B/L feet, "doesn't feel right".
Pt having really bad headache and requesting to see provider
Pt walked off unit standing by elevators, not wantings to come back on to floor, able to finally convince her to go back to her room
Patient is willing to received breakfast sliding scale dose of insulin as well as premeal
patient stated " I feel like my sugar is low"

## 2023-10-23 NOTE — PROGRESS NOTE ADULT - SUBJECTIVE AND OBJECTIVE BOX
Patient is a 52y old  Female who presents with a chief complaint of L eye vision loss (22 Oct 2023 12:02)      SUBJECTIVE / OVERNIGHT EVENTS: Pt seen and examined at 11:55am, overnight events noted, pt is calm now, family visiting, reports having multiple loose stools last night, none this am, reportedly "seeing brown wiggly lines when she closed her eyes last night", but not this am, no other new issues reported, agrees to give stool sample for stool studies if persists to have loose stools.    MEDICATIONS  (STANDING):  artificial tears (preservative free) Ophthalmic Solution 2 Drop(s) Both EYES four times a day  aspirin  chewable 81 milliGRAM(s) Oral daily  dextrose 5%. 1000 milliLiter(s) (100 mL/Hr) IV Continuous <Continuous>  dextrose 5%. 1000 milliLiter(s) (50 mL/Hr) IV Continuous <Continuous>  dextrose 50% Injectable 25 Gram(s) IV Push once  dextrose 50% Injectable 12.5 Gram(s) IV Push once  dextrose 50% Injectable 25 Gram(s) IV Push once  enoxaparin Injectable 40 milliGRAM(s) SubCutaneous every 24 hours  glucagon  Injectable 1 milliGRAM(s) IntraMuscular once  influenza   Vaccine 0.5 milliLiter(s) IntraMuscular once  insulin glargine Injectable (LANTUS) 14 Unit(s) SubCutaneous at bedtime  insulin lispro (ADMELOG) corrective regimen sliding scale   SubCutaneous three times a day before meals  insulin lispro (ADMELOG) corrective regimen sliding scale   SubCutaneous at bedtime  lactulose Syrup 10 Gram(s) Oral two times a day  lidocaine   4% Patch 1 Patch Transdermal daily  metFORMIN Extended-Release 500 milliGRAM(s) Oral daily  senna 2 Tablet(s) Oral at bedtime    MEDICATIONS  (PRN):  acetaminophen     Tablet .. 650 milliGRAM(s) Oral every 6 hours PRN Temp greater or equal to 38C (100.4F), Mild Pain (1 - 3)  cyclobenzaprine 5 milliGRAM(s) Oral three times a day PRN Muscle Spasm  dextrose Oral Gel 15 Gram(s) Oral once PRN Blood Glucose LESS THAN 70 milliGRAM(s)/deciliter  melatonin 3 milliGRAM(s) Oral at bedtime PRN Insomnia  traMADol 25 milliGRAM(s) Oral every 6 hours PRN Severe Pain (7 - 10)      Vital Signs Last 24 Hrs  T(C): 36.6 (23 Oct 2023 11:38), Max: 36.6 (22 Oct 2023 20:56)  T(F): 97.8 (23 Oct 2023 11:38), Max: 97.8 (22 Oct 2023 20:56)  HR: 90 (23 Oct 2023 11:38) (90 - 98)  BP: 139/82 (23 Oct 2023 11:38) (132/78 - 139/82)  BP(mean): --  RR: 18 (23 Oct 2023 11:38) (18 - 18)  SpO2: 100% (23 Oct 2023 11:38) (98% - 100%)    Parameters below as of 22 Oct 2023 20:56  Patient On (Oxygen Delivery Method): room air      CAPILLARY BLOOD GLUCOSE      POCT Blood Glucose.: 210 mg/dL (23 Oct 2023 12:29)  POCT Blood Glucose.: 140 mg/dL (23 Oct 2023 08:22)  POCT Blood Glucose.: 160 mg/dL (22 Oct 2023 22:01)  POCT Blood Glucose.: 120 mg/dL (22 Oct 2023 17:16)    I&O's Summary      PHYSICAL EXAM:  CONSTITUTIONAL: NAD, well-developed, well-groomed  RESPIRATORY: Normal respiratory effort; lungs are clear to auscultation bilaterally  CARDIOVASCULAR: Regular rate and rhythm, normal S1 and S2, no murmur/rub/gallop  ABDOMEN: Soft, Nontender to palpation, nondistended  MUSCULOSKELETAL: moves all extremities, no LE edema  NEURO: AA  answers questions appropriately      LABS:                    RADIOLOGY & ADDITIONAL TESTS:    Imaging Personally Reviewed:    Consultant(s) Notes Reviewed:      Care Discussed with Consultants/Other Providers:

## 2023-10-23 NOTE — PROVIDER CONTACT NOTE (OTHER) - ACTION/TREATMENT ORDERED:
ACP notified, VSS, FS WNL, ACP will come to bedside to assess pt, labs ordered and will give pt Tylenol.
ACP notified, came to bedside to assess patient.
Provider made aware, stated to see how much food the pt eats and if she eats all of it to give 4 units of insulin instead as long as the pt agrees. Pt stated she ate all her food and agreed to 4 units
PA made aware, stated to check blood glucose and can give apple juice if sugar is <150. Notify PA if patient still has symptoms
As per provider Roberto Erickson, hold insulin administration for lunch time. RN will continue to monitor.
As per provider Roberto Erickson, repeat fingerstick and administer insulin dose as prescribed. RN will continue to monitor.
tylenol will be given and CT of head will be done
Provider made aware, no interventions at this time.

## 2023-10-23 NOTE — PROVIDER CONTACT NOTE (OTHER) - NAME OF MD/NP/PA/DO NOTIFIED:
Graciela Kay NP
Brianna Guzman
Isaac Motley, ACP
Rich Ramos
Samina Holley
Mona Camacho
Pau Oh, ACP
Roberto Erickson
Roberto Erickson

## 2023-10-23 NOTE — CHART NOTE - NSCHARTNOTEFT_GEN_A_CORE
Notified by RN pt walked out of the unit towards the elevator stating she wants to go out, and its too dark in the room. Patient seen in room sitting up in bed, appears agitated and uncooperative with questions stating " what do you want/...  get out of here..., what you white people think you doing".. tried to engage in conversation but pt appeared to be more agitated and was again not  answering questions appropriately, therefore mental status assessment is somewhat limited. When asked if she needs any help or any updates or if she has any questions for me - she declined, states she almost had no sleep overnight and she wants to go home today later after she speaks to her family.  Upon reassessment pt became more cooperative and calmer, even apologetic, she was very upset that we don't have a bright lytes in the rooms, and therefore she want to go out to lighter area. She also reports having multiple episode of loose stools overnight, unable to describe how many times,  will check stool studies, education provided, all questions answered, will c/t monitor.  Vital Signs Last 24 Hrs  T(C): 36.6 (22 Oct 2023 20:56), Max: 36.6 (22 Oct 2023 05:58)  T(F): 97.8 (22 Oct 2023 20:56), Max: 97.9 (22 Oct 2023 05:58)  HR: 98 (22 Oct 2023 20:56) (86 - 98)  BP: 138/80 (22 Oct 2023 20:56) (120/66 - 138/80)  RR: 18 (22 Oct 2023 20:56) (18 - 18)  SpO2: 99% (22 Oct 2023 20:56) (97% - 99%)

## 2023-10-23 NOTE — PROVIDER CONTACT NOTE (OTHER) - DATE AND TIME:
20-Oct-2023 04:00
02-Oct-2023 17:23
28-Sep-2023 12:00
28-Sep-2023 10:30
02-Oct-2023 00:58
09-Oct-2023 17:50
12-Oct-2023 20:05
16-Oct-2023 21:52
23-Oct-2023 03:54

## 2023-10-23 NOTE — PROVIDER CONTACT NOTE (OTHER) - SITUATION
Pt walked off unit standing by elevators, not wanting to come back on to floor, able to finally convince her to go back to her room. Became agitated and annoyed, standing in doorway of her room angry.
Patient is willing to received breakfast sliding scale dose of insulin as well as premeal
Pt's fingerstick came back 83, sliding scale was held for parameter. Due to fingerstick being 83, pt refused pre meal insulin of 9 units because she felt like she did not need it.
patient complained of stomach pain and immediately had an episode of emesis (200ml) after emesis patient stated she does not have stomach pain
patient stated " I feel like my sugar is low"
Patient's blood glucose is 287, insulin administered per sliding scale and pre meal dose for breakfast.
Pt refusing to take premeal insulin pt is going to eat dinner
Pt c/o pressure behind eyes, tingling in B/L feet, "doesn't feel right". Had a previous stroke and was concerned.
pt having really bad headache and pt states she has never felt this before and is requesting to see provider

## 2023-10-24 LAB
ANION GAP SERPL CALC-SCNC: 12 MMOL/L — SIGNIFICANT CHANGE UP (ref 7–14)
ANION GAP SERPL CALC-SCNC: 12 MMOL/L — SIGNIFICANT CHANGE UP (ref 7–14)
BUN SERPL-MCNC: 20 MG/DL — SIGNIFICANT CHANGE UP (ref 7–23)
BUN SERPL-MCNC: 20 MG/DL — SIGNIFICANT CHANGE UP (ref 7–23)
CALCIUM SERPL-MCNC: 9.4 MG/DL — SIGNIFICANT CHANGE UP (ref 8.4–10.5)
CALCIUM SERPL-MCNC: 9.4 MG/DL — SIGNIFICANT CHANGE UP (ref 8.4–10.5)
CHLORIDE SERPL-SCNC: 99 MMOL/L — SIGNIFICANT CHANGE UP (ref 98–107)
CHLORIDE SERPL-SCNC: 99 MMOL/L — SIGNIFICANT CHANGE UP (ref 98–107)
CO2 SERPL-SCNC: 28 MMOL/L — SIGNIFICANT CHANGE UP (ref 22–31)
CO2 SERPL-SCNC: 28 MMOL/L — SIGNIFICANT CHANGE UP (ref 22–31)
CREAT SERPL-MCNC: 1.12 MG/DL — SIGNIFICANT CHANGE UP (ref 0.5–1.3)
CREAT SERPL-MCNC: 1.12 MG/DL — SIGNIFICANT CHANGE UP (ref 0.5–1.3)
EGFR: 59 ML/MIN/1.73M2 — LOW
EGFR: 59 ML/MIN/1.73M2 — LOW
GLUCOSE BLDC GLUCOMTR-MCNC: 118 MG/DL — HIGH (ref 70–99)
GLUCOSE BLDC GLUCOMTR-MCNC: 118 MG/DL — HIGH (ref 70–99)
GLUCOSE BLDC GLUCOMTR-MCNC: 144 MG/DL — HIGH (ref 70–99)
GLUCOSE BLDC GLUCOMTR-MCNC: 144 MG/DL — HIGH (ref 70–99)
GLUCOSE BLDC GLUCOMTR-MCNC: 148 MG/DL — HIGH (ref 70–99)
GLUCOSE BLDC GLUCOMTR-MCNC: 148 MG/DL — HIGH (ref 70–99)
GLUCOSE BLDC GLUCOMTR-MCNC: 156 MG/DL — HIGH (ref 70–99)
GLUCOSE BLDC GLUCOMTR-MCNC: 156 MG/DL — HIGH (ref 70–99)
GLUCOSE SERPL-MCNC: 172 MG/DL — HIGH (ref 70–99)
GLUCOSE SERPL-MCNC: 172 MG/DL — HIGH (ref 70–99)
HCT VFR BLD CALC: 35.2 % — SIGNIFICANT CHANGE UP (ref 34.5–45)
HCT VFR BLD CALC: 35.2 % — SIGNIFICANT CHANGE UP (ref 34.5–45)
HGB BLD-MCNC: 11.5 G/DL — SIGNIFICANT CHANGE UP (ref 11.5–15.5)
HGB BLD-MCNC: 11.5 G/DL — SIGNIFICANT CHANGE UP (ref 11.5–15.5)
MAGNESIUM SERPL-MCNC: 2.3 MG/DL — SIGNIFICANT CHANGE UP (ref 1.6–2.6)
MAGNESIUM SERPL-MCNC: 2.3 MG/DL — SIGNIFICANT CHANGE UP (ref 1.6–2.6)
MCHC RBC-ENTMCNC: 27.2 PG — SIGNIFICANT CHANGE UP (ref 27–34)
MCHC RBC-ENTMCNC: 27.2 PG — SIGNIFICANT CHANGE UP (ref 27–34)
MCHC RBC-ENTMCNC: 32.7 GM/DL — SIGNIFICANT CHANGE UP (ref 32–36)
MCHC RBC-ENTMCNC: 32.7 GM/DL — SIGNIFICANT CHANGE UP (ref 32–36)
MCV RBC AUTO: 83.2 FL — SIGNIFICANT CHANGE UP (ref 80–100)
MCV RBC AUTO: 83.2 FL — SIGNIFICANT CHANGE UP (ref 80–100)
NRBC # BLD: 0 /100 WBCS — SIGNIFICANT CHANGE UP (ref 0–0)
NRBC # BLD: 0 /100 WBCS — SIGNIFICANT CHANGE UP (ref 0–0)
NRBC # FLD: 0 K/UL — SIGNIFICANT CHANGE UP (ref 0–0)
NRBC # FLD: 0 K/UL — SIGNIFICANT CHANGE UP (ref 0–0)
PHOSPHATE SERPL-MCNC: 4.4 MG/DL — SIGNIFICANT CHANGE UP (ref 2.5–4.5)
PHOSPHATE SERPL-MCNC: 4.4 MG/DL — SIGNIFICANT CHANGE UP (ref 2.5–4.5)
PLATELET # BLD AUTO: 229 K/UL — SIGNIFICANT CHANGE UP (ref 150–400)
PLATELET # BLD AUTO: 229 K/UL — SIGNIFICANT CHANGE UP (ref 150–400)
POTASSIUM SERPL-MCNC: 4.5 MMOL/L — SIGNIFICANT CHANGE UP (ref 3.5–5.3)
POTASSIUM SERPL-MCNC: 4.5 MMOL/L — SIGNIFICANT CHANGE UP (ref 3.5–5.3)
POTASSIUM SERPL-SCNC: 4.5 MMOL/L — SIGNIFICANT CHANGE UP (ref 3.5–5.3)
POTASSIUM SERPL-SCNC: 4.5 MMOL/L — SIGNIFICANT CHANGE UP (ref 3.5–5.3)
RBC # BLD: 4.23 M/UL — SIGNIFICANT CHANGE UP (ref 3.8–5.2)
RBC # BLD: 4.23 M/UL — SIGNIFICANT CHANGE UP (ref 3.8–5.2)
RBC # FLD: 13.7 % — SIGNIFICANT CHANGE UP (ref 10.3–14.5)
RBC # FLD: 13.7 % — SIGNIFICANT CHANGE UP (ref 10.3–14.5)
SODIUM SERPL-SCNC: 139 MMOL/L — SIGNIFICANT CHANGE UP (ref 135–145)
SODIUM SERPL-SCNC: 139 MMOL/L — SIGNIFICANT CHANGE UP (ref 135–145)
WBC # BLD: 5.96 K/UL — SIGNIFICANT CHANGE UP (ref 3.8–10.5)
WBC # BLD: 5.96 K/UL — SIGNIFICANT CHANGE UP (ref 3.8–10.5)
WBC # FLD AUTO: 5.96 K/UL — SIGNIFICANT CHANGE UP (ref 3.8–10.5)
WBC # FLD AUTO: 5.96 K/UL — SIGNIFICANT CHANGE UP (ref 3.8–10.5)

## 2023-10-24 PROCEDURE — 99232 SBSQ HOSP IP/OBS MODERATE 35: CPT

## 2023-10-24 RX ADMIN — LIDOCAINE 1 PATCH: 4 CREAM TOPICAL at 00:05

## 2023-10-24 RX ADMIN — INSULIN GLARGINE 14 UNIT(S): 100 INJECTION, SOLUTION SUBCUTANEOUS at 22:12

## 2023-10-24 RX ADMIN — Medication 81 MILLIGRAM(S): at 17:27

## 2023-10-24 NOTE — CHART NOTE - NSCHARTNOTEFT_GEN_A_CORE
Discharge planning. Patient is appropriate for long term care. Discussed with Dr. Foreman, who is in agreement.      Cha Perez PA-C  Department of Medicine  Pager [phone]

## 2023-10-24 NOTE — PROVIDER CONTACT NOTE (MEDICATION) - ACTION/TREATMENT ORDERED:
pt dose of metformin was held, stat labs completed and the nurse manager Janelle took the bottle of metformin and vitamin, and lock both medication was locked in the [facility] cabinet. plan of care continues.

## 2023-10-24 NOTE — PROGRESS NOTE ADULT - SUBJECTIVE AND OBJECTIVE BOX
Patient is a 52y old  Female who presents with a chief complaint of L eye vision loss (23 Oct 2023 15:16)      SUBJECTIVE / OVERNIGHT EVENTS: Pt seen and examined at 11:40am, no overnight events noted, pt denies any complaints, no more loose stools, per nsg pt took her own metformin, no other new issues reported.      MEDICATIONS  (STANDING):  artificial tears (preservative free) Ophthalmic Solution 2 Drop(s) Both EYES four times a day  aspirin  chewable 81 milliGRAM(s) Oral daily  dextrose 5%. 1000 milliLiter(s) (100 mL/Hr) IV Continuous <Continuous>  dextrose 5%. 1000 milliLiter(s) (50 mL/Hr) IV Continuous <Continuous>  dextrose 50% Injectable 25 Gram(s) IV Push once  dextrose 50% Injectable 25 Gram(s) IV Push once  dextrose 50% Injectable 12.5 Gram(s) IV Push once  enoxaparin Injectable 40 milliGRAM(s) SubCutaneous every 24 hours  glucagon  Injectable 1 milliGRAM(s) IntraMuscular once  influenza   Vaccine 0.5 milliLiter(s) IntraMuscular once  insulin glargine Injectable (LANTUS) 14 Unit(s) SubCutaneous at bedtime  insulin lispro (ADMELOG) corrective regimen sliding scale   SubCutaneous at bedtime  insulin lispro (ADMELOG) corrective regimen sliding scale   SubCutaneous three times a day before meals  lactulose Syrup 10 Gram(s) Oral two times a day  lidocaine   4% Patch 1 Patch Transdermal daily  metFORMIN Extended-Release 500 milliGRAM(s) Oral daily  senna 2 Tablet(s) Oral at bedtime    MEDICATIONS  (PRN):  acetaminophen     Tablet .. 650 milliGRAM(s) Oral every 6 hours PRN Temp greater or equal to 38C (100.4F), Mild Pain (1 - 3)  cyclobenzaprine 5 milliGRAM(s) Oral three times a day PRN Muscle Spasm  dextrose Oral Gel 15 Gram(s) Oral once PRN Blood Glucose LESS THAN 70 milliGRAM(s)/deciliter  melatonin 3 milliGRAM(s) Oral at bedtime PRN Insomnia  traMADol 25 milliGRAM(s) Oral every 6 hours PRN Severe Pain (7 - 10)      Vital Signs Last 24 Hrs  T(C): 36.7 (24 Oct 2023 11:39), Max: 36.7 (24 Oct 2023 04:50)  T(F): 98 (24 Oct 2023 11:39), Max: 98.1 (24 Oct 2023 04:50)  HR: 100 (24 Oct 2023 11:39) (92 - 100)  BP: 100/64 (24 Oct 2023 11:39) (100/64 - 139/69)  BP(mean): --  RR: 18 (24 Oct 2023 11:39) (18 - 18)  SpO2: 100% (24 Oct 2023 11:39) (96% - 100%)    Parameters below as of 24 Oct 2023 11:39  Patient On (Oxygen Delivery Method): room air      CAPILLARY BLOOD GLUCOSE      POCT Blood Glucose.: 148 mg/dL (24 Oct 2023 11:44)  POCT Blood Glucose.: 118 mg/dL (24 Oct 2023 08:22)  POCT Blood Glucose.: 138 mg/dL (23 Oct 2023 22:07)  POCT Blood Glucose.: 134 mg/dL (23 Oct 2023 17:21)    I&O's Summary      PHYSICAL EXAM:  CONSTITUTIONAL: NAD, well-developed, well-groomed  RESPIRATORY: Normal respiratory effort; lungs are clear to auscultation bilaterally  CARDIOVASCULAR: Regular rate and rhythm, normal S1 and S2, no murmur/rub/gallop  ABDOMEN: Soft, Nontender to palpation, nondistended  MUSCULOSKELETAL: moves all extremities, no LE edema  NEURO: AA  answers questions appropriately      LABS:                        11.5   5.96  )-----------( 229      ( 24 Oct 2023 13:14 )             35.2     10-24    139  |  99  |  20  ----------------------------<  172<H>  4.5   |  28  |  1.12    Ca    9.4      24 Oct 2023 13:14  Phos  4.4     10-24  Mg     2.30     10-24            Urinalysis Basic - ( 24 Oct 2023 13:14 )    Color: x / Appearance: x / SG: x / pH: x  Gluc: 172 mg/dL / Ketone: x  / Bili: x / Urobili: x   Blood: x / Protein: x / Nitrite: x   Leuk Esterase: x / RBC: x / WBC x   Sq Epi: x / Non Sq Epi: x / Bacteria: x        RADIOLOGY & ADDITIONAL TESTS:    Imaging Personally Reviewed:    Consultant(s) Notes Reviewed:      Care Discussed with Consultants/Other Providers:

## 2023-10-24 NOTE — PROVIDER CONTACT NOTE (MEDICATION) - REASON
pt refused insulin and Lovenox
pt refusing lantus
Patient is refusing insulin coverage
Patient stated that she took her home dose of metformin
pt blood glucose is 88 and 4 units of insulin is due before meal
pt refused morning dose of insulin
pt refused pre meal insulin

## 2023-10-24 NOTE — PROVIDER CONTACT NOTE (MEDICATION) - DATE AND TIME:
19-Oct-2023 22:33
18-Oct-2023 09:08
24-Oct-2023 12:06
22-Oct-2023 09:14
18-Oct-2023 17:33
28-Sep-2023 09:57
19-Oct-2023 09:36

## 2023-10-24 NOTE — CHART NOTE - NSCHARTNOTESELECT_GEN_ALL_CORE
Event Note
Follow Up/Nutrition Services
Event Note

## 2023-10-24 NOTE — PROVIDER CONTACT NOTE (MEDICATION) - NAME OF MD/NP/PA/DO NOTIFIED:
CHIDI Queen
CHIDI Perez
CHIDI Queen
CHIDI Queen
Guanako Queen, ACP
juan jose Reed
Roberto Erickson ([employer]

## 2023-10-24 NOTE — PROVIDER CONTACT NOTE (MEDICATION) - SITUATION
Patient is refusing insulin coverage. RN educated patient about medication dosing and adherence. Teach back provided.
pt blood glucose is 94 and the pt refused pre meal insulin. patient educated on the indication of pre meal insulin.
pt blood glucose is 88, 4 units of insulin is due before meals. the pt stated my blood glucose is 88 and insulin will drop my glucose." pt educated on the indication of the insulin
I walked in the patient room at 12:00 to give her afternoon medication. the patient stated that she took her own dose of metformin at 12:00. I educated the patient why its important to not take home medication while in the hospital. I ask the patient to give me the bottle of metformin and she refused.
pt refusing bedtime lantus. pt states shes taking metformin during the day and does not want lantus
pt refused insulin and Lovenox
pt refused morning dose of insulin and stated shes won't take it and she needs metformin. pt was educated on the indication of insulin.

## 2023-10-24 NOTE — PROVIDER CONTACT NOTE (MEDICATION) - ASSESSMENT
pt is A&Ox4, no s/s of acute distress, vs are stable, and no c/o pain
pt is A&Ox4, no s/s of acute distress, vs remain stable.
pt is A&OX4, no s/s of acute distress, vs remain stable and no c/o pain
Patient is A&Ox4, Patient denies pain, chest pain, shortness of breath, nausea, vomiting or dizziness.
pt is A&Ox4, no s/s of acute distress, and vs remain stable.
pt VSS. FS stable. pt showing no s/s at this time
pt is A&OX4, no s/s of acute distress, vs remain stable and no c/o pain. blood glucose is 88

## 2023-10-24 NOTE — PROVIDER CONTACT NOTE (MEDICATION) - BACKGROUND
pt admitted for Serous retinal detachment of left eye
pt admitted with serous retinal detachment of the left eye
pt admitted with serous retinal detachment of the left eye
52 year old female admitted for serous retinal detachment of left eye
pt admitted with retinol detachment of the left eye
pt admitted with serous retinal detachment of the left eye and as a PMH of type 2 diabetes.
pt admitted with serous retinal detachment of the left eye and has a PMH of type two diabetes

## 2023-10-25 LAB
GLUCOSE BLDC GLUCOMTR-MCNC: 136 MG/DL — HIGH (ref 70–99)
GLUCOSE BLDC GLUCOMTR-MCNC: 136 MG/DL — HIGH (ref 70–99)
GLUCOSE BLDC GLUCOMTR-MCNC: 143 MG/DL — HIGH (ref 70–99)
GLUCOSE BLDC GLUCOMTR-MCNC: 143 MG/DL — HIGH (ref 70–99)
GLUCOSE BLDC GLUCOMTR-MCNC: 147 MG/DL — HIGH (ref 70–99)
GLUCOSE BLDC GLUCOMTR-MCNC: 147 MG/DL — HIGH (ref 70–99)
GLUCOSE BLDC GLUCOMTR-MCNC: 215 MG/DL — HIGH (ref 70–99)
GLUCOSE BLDC GLUCOMTR-MCNC: 215 MG/DL — HIGH (ref 70–99)

## 2023-10-25 PROCEDURE — 99232 SBSQ HOSP IP/OBS MODERATE 35: CPT

## 2023-10-25 RX ORDER — LACTOBACILLUS ACIDOPHILUS 100MM CELL
1 CAPSULE ORAL DAILY
Refills: 0 | Status: DISCONTINUED | OUTPATIENT
Start: 2023-10-25 | End: 2023-10-26

## 2023-10-25 RX ADMIN — INSULIN GLARGINE 14 UNIT(S): 100 INJECTION, SOLUTION SUBCUTANEOUS at 23:18

## 2023-10-25 RX ADMIN — Medication 1 TABLET(S): at 11:22

## 2023-10-25 RX ADMIN — Medication 2: at 17:47

## 2023-10-25 RX ADMIN — Medication 81 MILLIGRAM(S): at 11:22

## 2023-10-25 RX ADMIN — METFORMIN HYDROCHLORIDE 500 MILLIGRAM(S): 850 TABLET ORAL at 11:22

## 2023-10-25 NOTE — PROGRESS NOTE ADULT - SUBJECTIVE AND OBJECTIVE BOX
Patient is a 52y old  Female who presents with a chief complaint of L eye vision loss (24 Oct 2023 16:05)      SUBJECTIVE / OVERNIGHT EVENTS: Pt seen and examined at 10:55am, no overnight events noted, pt denies any complaints, no other new issues reported.      MEDICATIONS  (STANDING):  artificial tears (preservative free) Ophthalmic Solution 2 Drop(s) Both EYES four times a day  aspirin  chewable 81 milliGRAM(s) Oral daily  dextrose 5%. 1000 milliLiter(s) (50 mL/Hr) IV Continuous <Continuous>  dextrose 5%. 1000 milliLiter(s) (100 mL/Hr) IV Continuous <Continuous>  dextrose 50% Injectable 25 Gram(s) IV Push once  dextrose 50% Injectable 12.5 Gram(s) IV Push once  dextrose 50% Injectable 25 Gram(s) IV Push once  enoxaparin Injectable 40 milliGRAM(s) SubCutaneous every 24 hours  glucagon  Injectable 1 milliGRAM(s) IntraMuscular once  influenza   Vaccine 0.5 milliLiter(s) IntraMuscular once  insulin glargine Injectable (LANTUS) 14 Unit(s) SubCutaneous at bedtime  insulin lispro (ADMELOG) corrective regimen sliding scale   SubCutaneous at bedtime  insulin lispro (ADMELOG) corrective regimen sliding scale   SubCutaneous three times a day before meals  lactobacillus acidophilus 1 Tablet(s) Oral daily  lactulose Syrup 10 Gram(s) Oral two times a day  lidocaine   4% Patch 1 Patch Transdermal daily  metFORMIN Extended-Release 500 milliGRAM(s) Oral daily  senna 2 Tablet(s) Oral at bedtime    MEDICATIONS  (PRN):  acetaminophen     Tablet .. 650 milliGRAM(s) Oral every 6 hours PRN Temp greater or equal to 38C (100.4F), Mild Pain (1 - 3)  cyclobenzaprine 5 milliGRAM(s) Oral three times a day PRN Muscle Spasm  dextrose Oral Gel 15 Gram(s) Oral once PRN Blood Glucose LESS THAN 70 milliGRAM(s)/deciliter  melatonin 3 milliGRAM(s) Oral at bedtime PRN Insomnia  traMADol 25 milliGRAM(s) Oral every 6 hours PRN Severe Pain (7 - 10)      Vital Signs Last 24 Hrs  T(C): 36.8 (25 Oct 2023 12:38), Max: 36.8 (25 Oct 2023 12:38)  T(F): 98.2 (25 Oct 2023 12:38), Max: 98.2 (25 Oct 2023 12:38)  HR: 69 (25 Oct 2023 12:38) (69 - 98)  BP: 147/83 (25 Oct 2023 12:38) (132/69 - 147/83)  BP(mean): --  RR: 18 (25 Oct 2023 12:38) (18 - 18)  SpO2: 100% (25 Oct 2023 12:38) (100% - 100%)    Parameters below as of 25 Oct 2023 12:38  Patient On (Oxygen Delivery Method): room air      CAPILLARY BLOOD GLUCOSE      POCT Blood Glucose.: 136 mg/dL (25 Oct 2023 11:59)  POCT Blood Glucose.: 147 mg/dL (25 Oct 2023 08:23)  POCT Blood Glucose.: 156 mg/dL (24 Oct 2023 22:08)  POCT Blood Glucose.: 144 mg/dL (24 Oct 2023 17:16)    I&O's Summary      PHYSICAL EXAM:  CONSTITUTIONAL: NAD, well-developed, well-groomed  RESPIRATORY: Normal respiratory effort; lungs are clear to auscultation bilaterally  CARDIOVASCULAR: Regular rate and rhythm, normal S1 and S2, no murmur/rub/gallop  ABDOMEN: Soft, Nontender to palpation, nondistended  MUSCULOSKELETAL: moves all extremities, no LE edema  NEURO: AA  answers questions appropriately        LABS:                        11.5   5.96  )-----------( 229      ( 24 Oct 2023 13:14 )             35.2     10-24    139  |  99  |  20  ----------------------------<  172<H>  4.5   |  28  |  1.12    Ca    9.4      24 Oct 2023 13:14  Phos  4.4     10-24  Mg     2.30     10-24            Urinalysis Basic - ( 24 Oct 2023 13:14 )    Color: x / Appearance: x / SG: x / pH: x  Gluc: 172 mg/dL / Ketone: x  / Bili: x / Urobili: x   Blood: x / Protein: x / Nitrite: x   Leuk Esterase: x / RBC: x / WBC x   Sq Epi: x / Non Sq Epi: x / Bacteria: x        RADIOLOGY & ADDITIONAL TESTS:    Imaging Personally Reviewed:    Consultant(s) Notes Reviewed:      Care Discussed with Consultants/Other Providers:

## 2023-10-26 ENCOUNTER — TRANSCRIPTION ENCOUNTER (OUTPATIENT)
Age: 53
End: 2023-10-26

## 2023-10-26 ENCOUNTER — INPATIENT (INPATIENT)
Facility: HOSPITAL | Age: 53
LOS: 0 days | Discharge: HOME | End: 2023-10-27
Attending: STUDENT IN AN ORGANIZED HEALTH CARE EDUCATION/TRAINING PROGRAM | Admitting: STUDENT IN AN ORGANIZED HEALTH CARE EDUCATION/TRAINING PROGRAM
Payer: MEDICAID

## 2023-10-26 VITALS
TEMPERATURE: 99 F | OXYGEN SATURATION: 100 % | SYSTOLIC BLOOD PRESSURE: 148 MMHG | RESPIRATION RATE: 18 BRPM | DIASTOLIC BLOOD PRESSURE: 87 MMHG | HEART RATE: 98 BPM

## 2023-10-26 LAB
GLUCOSE BLDC GLUCOMTR-MCNC: 146 MG/DL — HIGH (ref 70–99)
GLUCOSE BLDC GLUCOMTR-MCNC: 146 MG/DL — HIGH (ref 70–99)
GLUCOSE BLDC GLUCOMTR-MCNC: 148 MG/DL — HIGH (ref 70–99)
GLUCOSE BLDC GLUCOMTR-MCNC: 148 MG/DL — HIGH (ref 70–99)
GLUCOSE BLDC GLUCOMTR-MCNC: 150 MG/DL — HIGH (ref 70–99)
GLUCOSE BLDC GLUCOMTR-MCNC: 150 MG/DL — HIGH (ref 70–99)

## 2023-10-26 PROCEDURE — 99239 HOSP IP/OBS DSCHRG MGMT >30: CPT

## 2023-10-26 PROCEDURE — 99285 EMERGENCY DEPT VISIT HI MDM: CPT | Mod: 25

## 2023-10-26 RX ORDER — LACTOBACILLUS ACIDOPHILUS 100MM CELL
1 CAPSULE ORAL
Qty: 0 | Refills: 0 | DISCHARGE
Start: 2023-10-26

## 2023-10-26 RX ORDER — CYCLOBENZAPRINE HYDROCHLORIDE 10 MG/1
1 TABLET, FILM COATED ORAL
Qty: 0 | Refills: 0 | DISCHARGE
Start: 2023-10-26

## 2023-10-26 RX ORDER — SENNA PLUS 8.6 MG/1
2 TABLET ORAL
Qty: 0 | Refills: 0 | DISCHARGE
Start: 2023-10-26

## 2023-10-26 RX ORDER — TRAMADOL HYDROCHLORIDE 50 MG/1
0.5 TABLET ORAL
Qty: 0 | Refills: 0 | DISCHARGE
Start: 2023-10-26

## 2023-10-26 RX ORDER — LIDOCAINE 4 G/100G
1 CREAM TOPICAL
Qty: 0 | Refills: 0 | DISCHARGE
Start: 2023-10-26

## 2023-10-26 RX ORDER — ISOPROPYL ALCOHOL, BENZOCAINE .7; .06 ML/ML; ML/ML
0 SWAB TOPICAL
Qty: 100 | Refills: 1
Start: 2023-10-26

## 2023-10-26 RX ORDER — ASPIRIN/CALCIUM CARB/MAGNESIUM 324 MG
1 TABLET ORAL
Qty: 0 | Refills: 0 | DISCHARGE
Start: 2023-10-26

## 2023-10-26 RX ORDER — LACTULOSE 10 G/15ML
15 SOLUTION ORAL
Qty: 0 | Refills: 0 | DISCHARGE
Start: 2023-10-26

## 2023-10-26 RX ADMIN — LIDOCAINE 1 PATCH: 4 CREAM TOPICAL at 12:13

## 2023-10-26 RX ADMIN — TRAMADOL HYDROCHLORIDE 25 MILLIGRAM(S): 50 TABLET ORAL at 03:00

## 2023-10-26 RX ADMIN — TRAMADOL HYDROCHLORIDE 25 MILLIGRAM(S): 50 TABLET ORAL at 02:34

## 2023-10-26 RX ADMIN — METFORMIN HYDROCHLORIDE 500 MILLIGRAM(S): 850 TABLET ORAL at 12:12

## 2023-10-26 RX ADMIN — Medication 81 MILLIGRAM(S): at 12:13

## 2023-10-26 RX ADMIN — Medication 1 TABLET(S): at 12:13

## 2023-10-26 NOTE — PROGRESS NOTE ADULT - PROBLEM SELECTOR PLAN 3
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 Discussed with patient if willing to try another type of statin or reduced dosage but patient does not want to restart currently
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 Discussed with patient if willing to try another type of statin or reduced dosage but patient does not want to restart currently
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 Discussed with patient if willing to try another type of statin or reduced dosage but patient does not want to restart currently
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 Discussed with patient if willing to try another type of statin or reduced dosage but patient does not want to restart currently
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 Discussed with patient if willing to try another type of statin or reduced dosage but patient does not want to restart currently
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 Discussed with patient if willing to try another type of statin or reduced dosage but patient does not want to restart currently
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 Discussed with patient if willing to try another type of statin or reduced dosage but patient does not want to restart currently
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 Discussed with patient if willing to try another type of statin or reduced dosage but patient does not want to restart currently
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 Discussed with patient if willing to try another type of statin or reduced dosage but patient does not want to restart currently
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 Discussed with patient if willing to try another type of statin or reduced dosage but patient does not want to restart currently

## 2023-10-26 NOTE — PROGRESS NOTE ADULT - PROBLEM SELECTOR PLAN 2
History of IDDM with recent insurance switch causing issues regarding obtaining medications. Also with new acute blindness, patient is not comfortable injecting insulin  - A1c 8.5 (improved from prior hospitalization of >11). Partly due to decreased PO intake in recent months.   - has not used insulin in recent months reportedly used old leftover Metformin in the interim  -c/w current basal/bolus coverage while patient is in the hospital  -on discharge, will start metformin and jardiance (medicine delivered to her bedside)  -also will provide her with freestyle chas for FS monitoring (pharmacy does not have glucose reader but recommend using nikhil on phone). Patient does not want to use her phone and wants speaking glucometer. [facility] does not have one, will cont to investigate
History of IDDM with recent insurance switch causing issues regarding obtaining medications. Also with new acute blindness, patient is not comfortable injecting insulin  - A1c 8.5 (improved from prior hospitalization of >11). Partly due to decreased PO intake in recent months.   - has not used insulin in recent months reportedly used old leftover Metformin in the interim  -c/w current basal/bolus coverage while patient is in the hospital  -on discharge, will start metformin and jardiance.
History of IDDM with recent insurance switch causing issues regarding obtaining medications. Also with new acute blindness, patient is not comfortable injecting insulin  - A1c 8.5 (improved from prior hospitalization of >11). Partly due to decreased PO intake in recent months.   - has not used insulin in recent months reportedly used old leftover Metformin in the interim  -c/w current basal/bolus coverage while patient is in the hospital  -on discharge, will start metformin and jardiance (medicine delivered to her bedside)  -also will provide her with freestyle gianna for FS monitoring (pharmacy does not have glucose reader but recommend using nikhil on phone). Patient does not want to use her phone and wants speaking glucometer. [facility] does not have one. Will need to send to outside pharmacy to obtain one if they carry it.
History of IDDM with recent insurance switch causing issues regarding obtaining medications. Also with new acute blindness, patient is not comfortable injecting insulin  - A1c 8.5 (improved from prior hospitalization of >11). Partly due to decreased PO intake in recent months.   - has not used insulin in recent months reportedly used old leftover Metformin in the interim  -c/w current basal/bolus coverage while patient is in the hospital  -on discharge, will start metformin and jardiance (medicine delivered to her bedside)  pharmacist working with pt to use glucometer    now transitioning to metformin while in house, titrating down pre-meal coverage
History of IDDM with recent insurance switch causing issues regarding obtaining medications. Also with new acute blindness, patient is not comfortable injecting insulin  - A1c 8.5 (improved from prior hospitalization of >11). Partly due to decreased PO intake in recent months.   - has not used insulin in recent months reportedly used old leftover Metformin in the interim  -c/w current basal/bolus coverage while patient is in the hospital  -on discharge, will start metformin and jardiance.  -also will provide her with freestyle gianna for FS monitoring (pharmacy does not have glucose reader but recommend using nikhil on phone). Patient does not want to use her phone and wants speaking glucometer. [facility] does not have one. Diabetes educator assisting to obtain one.
History of IDDM with recent insurance switch causing issues regarding obtaining medications. Also with new acute blindness, patient is not comfortable injecting insulin  - A1c 8.5 (improved from prior hospitalization of >11). Partly due to decreased PO intake in recent months.   - has not used insulin in recent months reportedly used old leftover Metformin in the interim  -c/w current basal/bolus coverage while patient is in the hospital; premeal now stopped due to metformin  -on discharge, will start metformin and jardiance (medicine delivered to her bedside)  pharmacist working with pt to use glucometer    now transitioning to metformin while in house, titrating down pre-meal coverage; will d/w endo for further revision of DM regimen due to pt c/o diarrhea
History of IDDM with recent insurance switch causing issues regarding obtaining medications. Also with new acute blindness, patient is not comfortable injecting insulin  - A1c 8.5 (improved from prior hospitalization of >11). Partly due to decreased PO intake in recent months.   - has not used insulin in recent months reportedly used old leftover Metformin in the interim  -c/w current basal/bolus coverage while patient is in the hospital; pre meal now stopped due to metformin  -on discharge, will start metformin and Jardiance (medicine delivered to her bedside)  pharmacist working with pt to use glucometer    now on metformin 500mg daily, will monitor loose stools, if persists to check stool studies- reports no further loose stools
History of IDDM with recent insurance switch causing issues regarding obtaining medications. Also with new acute blindness, patient is not comfortable injecting insulin  - A1c 8.5 (improved from prior hospitalization of >11). Partly due to decreased PO intake in recent months.   - has not used insulin in recent months reportedly used old leftover Metformin in the interim  -c/w current basal/bolus coverage while patient is in the hospital  -on discharge, will start metformin and jardiance (medicine delivered to her bedside)  pharmacist working with pt to use glucometer
History of IDDM with recent insurance switch causing issues regarding obtaining medications. Also with new acute blindness, patient is not comfortable injecting insulin  - A1c 8.5 (improved from prior hospitalization of >11). Partly due to decreased PO intake in recent months.   - has not used insulin in recent months reportedly used old leftover Metformin in the interim  -c/w current basal/bolus coverage while patient is in the hospital  -on discharge, will start metformin and jardiance (medicine delivered to her bedside)  pharmacist working with pt to use glucometer    now transitioning to metformin while in house, titrating down pre-meal coverage
History of IDDM with recent insurance switch causing issues regarding obtaining medications. Also with new acute blindness, patient is not comfortable injecting insulin  - A1c 8.5 (improved from prior hospitalization of >11). Partly due to decreased PO intake in recent months.   - has not used insulin in recent months reportedly used old leftover Metformin in the interim  -c/w current basal/bolus coverage while patient is in the hospital  -on discharge, will start metformin and jardiance.  -also will provide her with freestyle gianna for FS monitoring
History of IDDM with recent insurance switch causing issues regarding obtaining medications. Also with new acute blindness, patient is not comfortable injecting insulin  - A1c 8.5 (improved from prior hospitalization of >11). Partly due to decreased PO intake in recent months.   - has not used insulin in recent months reportedly used old leftover Metformin in the interim  -c/w current basal/bolus coverage while patient is in the hospital; premeal now stopped due to metformin  -on discharge, will start metformin and jardiance (medicine delivered to her bedside)  pharmacist working with pt to use glucometer    now transitioning to metformin while in house, titrating down pre-meal coverage; will d/w endo for further revision of DM regimen due to pt c/o diarrhea
History of IDDM with recent insurance switch causing issues regarding obtaining medications. Also with new acute blindness, patient is not comfortable injecting insulin  - A1c 8.5 (improved from prior hospitalization of >11). Partly due to decreased PO intake in recent months.   - has not used insulin in recent months reportedly used old leftover Metformin in the interim  -c/w current basal/bolus coverage while patient is in the hospital  -on discharge, will start metformin and jardiance (medicine delivered to her bedside)  -also will provide her with freestyle chas for FS monitoring (pharmacy does not have glucose reader but recommend using nikhil on phone). Patient does not want to use her phone and wants speaking glucometer. [facility] does not have one, will cont to investigate
History of IDDM with recent insurance switch causing issues regarding obtaining medications. Also with new acute blindness, patient is not comfortable injecting insulin  - A1c 8.5 (improved from prior hospitalization of >11). Partly due to decreased PO intake in recent months.   - has not used insulin in recent months reportedly used old leftover Metformin in the interim  -c/w current basal/bolus coverage while patient is in the hospital  -on discharge, will start metformin and jardiance (medicine delivered to her bedside)  pharmacist working with pt to use glucometer
History of IDDM with recent insurance switch causing issues regarding obtaining medications  - A1c 8.5 (improved from prior hospitalization of >11)  - supposed to be on Basaglar 17 units QHS and Humalog 10 units TID premeals  - has not used insulin in around a month, reportedly used old leftover Metformin in the interim  - will restart basal-bolus at reduced dosage (0.8x home dosage), adjust as needed based on correctional  - double check insulin insurance coverage prior to discharge  - b/l feet numbness likely iso neuropathy from diabetes. Will obtain folate, B12 to assess further given poor diet  - diabetes specialist, nutritionist
History of IDDM with recent insurance switch causing issues regarding obtaining medications. Also with new acute blindness, patient is not comfortable injecting insulin  - A1c 8.5 (improved from prior hospitalization of >11). Partly due to decreased PO intake in recent months.   - has not used insulin in recent months reportedly used old leftover Metformin in the interim  -c/w current basal/bolus coverage while patient is in the hospital  -on discharge, will start metformin and jardiance.  -also will provide her with freestyle gianna for FS monitoring (pharmacy does not have glucose reader but recommend using nikhil on phone)
History of IDDM with recent insurance switch causing issues regarding obtaining medications. Also with new acute blindness, patient is not comfortable injecting insulin  - A1c 8.5 (improved from prior hospitalization of >11). Partly due to decreased PO intake in recent months.   - supposed to be on Basaglar 17 units QHS and Humalog 10 units TID premeals  - has not used insulin in recent months reportedly used old leftover Metformin in the interim  - will restart basal-bolus at reduced dosage (0.8x home dosage), adjust as needed based on correctional  -endo eval for possible switching her regimen as she is unable to inject insulin 4 times a day currently. Is oral regimen an option for her? If not she will need NH placement
History of IDDM with recent insurance switch causing issues regarding obtaining medications. Also with new acute blindness, patient is not comfortable injecting insulin  - A1c 8.5 (improved from prior hospitalization of >11). Partly due to decreased PO intake in recent months.   - has not used insulin in recent months reportedly used old leftover Metformin in the interim  -c/w current basal/bolus coverage while patient is in the hospital  -on discharge, will start metformin and jardiance (medicine delivered to her bedside)  pharmacist working with pt to use glucometer    now transitioning to metformin while in house, titrating down pre-meal coverage
History of IDDM with recent insurance switch causing issues regarding obtaining medications. Also with new acute blindness, patient is not comfortable injecting insulin  - A1c 8.5 (improved from prior hospitalization of >11). Partly due to decreased PO intake in recent months.   - has not used insulin in recent months reportedly used old leftover Metformin in the interim  -c/w current basal/bolus coverage while patient is in the hospital; pre meal now stopped due to metformin  -on discharge, will start metformin and Jardiance (medicine delivered to her bedside)  pharmacist working with pt to use glucometer    now transitioning to metformin while in house, titrating down pre-meal coverage; will d/w endo for further revision of DM regimen due to pt c/o diarrhea
History of IDDM with recent insurance switch causing issues regarding obtaining medications. Also with new acute blindness, patient is not comfortable injecting insulin  - A1c 8.5 (improved from prior hospitalization of >11). Partly due to decreased PO intake in recent months.   - has not used insulin in recent months reportedly used old leftover Metformin in the interim  -c/w current basal/bolus coverage while patient is in the hospital  -on discharge, will start metformin and jardiance (medicine delivered to her bedside)  -also will provide her with freestyle chas for FS monitoring (pharmacy does not have glucose reader but recommend using nikhil on phone). Patient does not want to use her phone and wants speaking glucometer. [facility] does not have one, will cont to investigate
History of IDDM with recent insurance switch causing issues regarding obtaining medications. Also with new acute blindness, patient is not comfortable injecting insulin  - A1c 8.5 (improved from prior hospitalization of >11). Partly due to decreased PO intake in recent months.   - has not used insulin in recent months reportedly used old leftover Metformin in the interim  -c/w current basal/bolus coverage while patient is in the hospital; pre meal now stopped due to metformin  -on discharge, will start metformin and Jardiance (medicine delivered to her bedside)  pharmacist working with pt to use glucometer    now on metformin 500mg daily, will monitor loose stools, if persists to check stool studies
History of IDDM with recent insurance switch causing issues regarding obtaining medications. Also with new acute blindness, patient is not comfortable injecting insulin  - A1c 8.5 (improved from prior hospitalization of >11). Partly due to decreased PO intake in recent months.   - has not used insulin in recent months reportedly used old leftover Metformin in the interim  -c/w current basal/bolus coverage while patient is in the hospital  -on discharge, will start metformin and jardiance (medicine delivered to her bedside)  -also will provide her with freestyle chas for FS monitoring (pharmacy does not have glucose reader but recommend using nikhil on phone). Patient does not want to use her phone and wants speaking glucometer. [facility] does not have one, will cont to investigate
History of IDDM with recent insurance switch causing issues regarding obtaining medications. Also with new acute blindness, patient is not comfortable injecting insulin  - A1c 8.5 (improved from prior hospitalization of >11). Partly due to decreased PO intake in recent months.   - has not used insulin in recent months reportedly used old leftover Metformin in the interim  -c/w current basal/bolus coverage while patient is in the hospital  -on discharge, will start metformin and jardiance.  -also will provide her with freestyle gianna for FS monitoring
History of IDDM with recent insurance switch causing issues regarding obtaining medications. Also with new acute blindness, patient is not comfortable injecting insulin  - A1c 8.5 (improved from prior hospitalization of >11). Partly due to decreased PO intake in recent months.   - has not used insulin in recent months reportedly used old leftover Metformin in the interim  -c/w current basal/bolus coverage while patient is in the hospital  -on discharge, will start metformin and jardiance (medicine delivered to her bedside)  -also will provide her with freestyle chas for FS monitoring (pharmacy does not have glucose reader but recommend using nikhil on phone). Patient does not want to use her phone and wants speaking glucometer. [facility] does not have one, will cont to investigate
History of IDDM with recent insurance switch causing issues regarding obtaining medications. Also with new acute blindness, patient is not comfortable injecting insulin  - A1c 8.5 (improved from prior hospitalization of >11). Partly due to decreased PO intake in recent months.   - has not used insulin in recent months reportedly used old leftover Metformin in the interim  -c/w current basal/bolus coverage while patient is in the hospital  -on discharge, will start metformin and jardiance (medicine delivered to her bedside)  -also will provide her with freestyle chas for FS monitoring (pharmacy does not have glucose reader but recommend using nikhil on phone). Patient does not want to use her phone and wants speaking glucometer. [facility] does not have one, will cont to investigate
History of IDDM with recent insurance switch causing issues regarding obtaining medications. Also with new acute blindness, patient is not comfortable injecting insulin  - A1c 8.5 (improved from prior hospitalization of >11). Partly due to decreased PO intake in recent months.   - has not used insulin in recent months reportedly used old leftover Metformin in the interim  -c/w current basal/bolus coverage while patient is in the hospital; pre meal now stopped due to metformin  -on discharge, will start metformin and Jardiance (medicine delivered to her bedside)  pharmacist working with pt to use glucometer    now on metformin 500mg daily, will monitor loose stools, if persists to check stool studies- reports no further loose stools
History of IDDM with recent insurance switch causing issues regarding obtaining medications. Also with new acute blindness, patient is not comfortable injecting insulin  - A1c 8.5 (improved from prior hospitalization of >11). Partly due to decreased PO intake in recent months.   - has not used insulin in recent months reportedly used old leftover Metformin in the interim  -c/w current basal/bolus coverage while patient is in the hospital; premeal now stopped due to metformin  -on discharge, will start metformin and jardiance (medicine delivered to her bedside)  pharmacist working with pt to use glucometer    now transitioning to metformin while in house, titrating down pre-meal coverage; will d/w endo for further revision of DM regimen due to pt c/o diarrhea
History of IDDM with recent insurance switch causing issues regarding obtaining medications. Also with new acute blindness, patient is not comfortable injecting insulin  - A1c 8.5 (improved from prior hospitalization of >11). Partly due to decreased PO intake in recent months.   - has not used insulin in recent months reportedly used old leftover Metformin in the interim  -c/w current basal/bolus coverage while patient is in the hospital; pre meal now stopped due to metformin  -on discharge, will start metformin and Jardiance (medicine delivered to her bedside)  pharmacist working with pt to use glucometer    now on metformin 500mg daily, will monitor loose stools, if persists to check stool studies- reports no further loose stools
History of IDDM with recent insurance switch causing issues regarding obtaining medications  - A1c 8.5 (improved from prior hospitalization of >11)  - supposed to be on Basaglar 17 units QHS and Humalog 10 units TID premeals  - has not used insulin in around a month, reportedly used old leftover Metformin in the interim  - will restart basal-bolus at reduced dosage (0.8x home dosage), adjust as needed based on correctional  - double check insulin insurance coverage prior to discharge  - b/l feet numbness likely iso neuropathy from diabetes. Will obtain folate, B12 to assess further given poor diet  - diabetes specialist, nutritionist

## 2023-10-26 NOTE — DISCHARGE NOTE NURSING/CASE MANAGEMENT/SOCIAL WORK - PATIENT PORTAL LINK FT
You can access the FollowMyHealth Patient Portal offered by [facility] by registering at the following website: [email or web address]. By joining [facility]’s FollowMyHealth portal, you will also be able to view your health information using other applications (apps) compatible with our system.

## 2023-10-26 NOTE — PROGRESS NOTE ADULT - PROBLEM SELECTOR PLAN 5
- associated with HA  - ophtho to re-eval, no further interventions; recommend artificial tears
- associated with HA  - ophtho to re-eval, no further interventions; recommend artificial tears
DVT ppx: Lovenox  Diet: DASH/TLC, Consistent Carb
- associated with HA  - ophtho to re-eval, no further interventions; recommend artificial tears
- changed miralax--> lactulose; changed senna--> dulcolax on 10/7  - ordered dulcolax suppository 10/8
DVT ppx: Lovenox  Diet: DASH/TLC, Consistent Carb    DC planning complex due to new blindness; SW/CM and hospital admin aware
DVT ppx: Lovenox  Diet: DASH/TLC, Consistent Carb    DC planning. Discussed with her that shelter is not appropriate setting. She is not amenable to SNF, SW exploring poss assisted living, pt informed SW that the CM at the shelter was working on placement in apartment; SW to f/u with shelter CM; await safe dispo or will be AMA
DVT ppx: Lovenox  Diet: DASH/TLC, Consistent Carb  PT/OT to assess balance and function with vision changes  SW to help with insurance and medication issues
- changed miralax--> lactulose; changed senna--> dulcolax on 10/7  - ordered dulcolax suppository 10/8
DVT ppx: Lovenox  Diet: DASH/TLC, Consistent Carb    DC planning. Discussed with her that shelter is not appropriate setting. She is not amenable to SNF now as she heard bad reviews. I explained to her that DC will be AMA until we can find her a safe place to go.
DVT ppx: Lovenox  Diet: DASH/TLC, Consistent Carb    DC planning. Discussed with her that shelter is not appropriate setting. She is now amenable to SNF
DVT ppx: Lovenox  Diet: DASH/TLC, Consistent Carb    DC planning complex due to new blindness; SW/CM and hospital admin aware
- associated with HA  - ophtho to re-eval, no further interventions; recommend artificial tears
- changed miralax--> lactulose; changed senna--> dulcolax on 10/7  - ordered dulcolax suppository 10/8
- changed miralax--> lactulose; changed senna--> dulcolax on 10/7  - ordered dulcolax suppository 10/8
DVT ppx: Lovenox  Diet: DASH/TLC, Consistent Carb  PT/OT to assess balance and function with vision changes  SW to help with insurance and medication issues
- changed miralax--> lactulose; changed senna--> dulcolax on 10/7  - ordered dulcolax suppository 10/8
DVT ppx: Lovenox  Diet: DASH/TLC, Consistent Carb    DC planning complex due to new blindness; SW/CM and hospital admin aware
DVT ppx: Lovenox  Diet: DASH/TLC, Consistent Carb    DC planning. Discussed with her that shelter is not appropriate setting. She is not amenable to SNF now as she heard bad reviews. I explained to her that DC will be AMA until we can find her a safe place to go.
DVT ppx: Lovenox  Diet: DASH/TLC, Consistent Carb    DC planning complex due to new blindness; SW/CM and hospital admin aware
- changed miralax--> lactulose; changed senna--> dulcolax on 10/7  - ordered dulcolax suppository 10/8
- changed miralax--> lactulose; changed senna--> dulcolax on 10/7  - ordered dulcolax suppository 10/8
DVT ppx: Lovenox  Diet: DASH/TLC, Consistent Carb    DC planning. Discussed with her that shelter is not appropriate setting. She is not amenable to SNF, SW exploring poss assisted living, pt informed SW that the CM at the shelter was working on placement in apartment; SW to f/u with shelter CM; await safe dispo or will be AMA
DVT ppx: Lovenox  Diet: DASH/TLC, Consistent Carb    DC planning. Discussed with her that shelter is not appropriate setting. She is not amenable to SNF now as she heard bad reviews. I explained to her that DC will be AMA until we can find her a safe place to go.
DVT ppx: Lovenox  Diet: DASH/TLC, Consistent Carb    DC planning. Does not want SNF
- associated with HA  - ophtho to re-eval, no further interventions; recommend artificial tears
DVT ppx: Lovenox  Diet: DASH/TLC, Consistent Carb    DC planning. Discussed with her that shelter is not appropriate setting. She is not amenable to SNF, SW exploring poss assisted living, pt informed SW that the CM at the shelter was working on placement in apartment; SW to f/u with shelter CM; await safe dispo or will be AMA
DVT ppx: Lovenox  Diet: DASH/TLC, Consistent Carb

## 2023-10-26 NOTE — PROGRESS NOTE ADULT - SUBJECTIVE AND OBJECTIVE BOX
Patient is a 52y old  Female who presents with a chief complaint of L eye vision loss (25 Oct 2023 14:43)      SUBJECTIVE / OVERNIGHT EVENTS: Pt seen and examined at 11:10am, no overnight events noted, pt denies any complaints, no other new issues reported.      MEDICATIONS  (STANDING):  artificial tears (preservative free) Ophthalmic Solution 2 Drop(s) Both EYES four times a day  aspirin  chewable 81 milliGRAM(s) Oral daily  dextrose 5%. 1000 milliLiter(s) (100 mL/Hr) IV Continuous <Continuous>  dextrose 5%. 1000 milliLiter(s) (50 mL/Hr) IV Continuous <Continuous>  dextrose 50% Injectable 25 Gram(s) IV Push once  dextrose 50% Injectable 12.5 Gram(s) IV Push once  dextrose 50% Injectable 25 Gram(s) IV Push once  enoxaparin Injectable 40 milliGRAM(s) SubCutaneous every 24 hours  glucagon  Injectable 1 milliGRAM(s) IntraMuscular once  influenza   Vaccine 0.5 milliLiter(s) IntraMuscular once  insulin glargine Injectable (LANTUS) 14 Unit(s) SubCutaneous at bedtime  insulin lispro (ADMELOG) corrective regimen sliding scale   SubCutaneous at bedtime  insulin lispro (ADMELOG) corrective regimen sliding scale   SubCutaneous three times a day before meals  lactobacillus acidophilus 1 Tablet(s) Oral daily  lactulose Syrup 10 Gram(s) Oral two times a day  lidocaine   4% Patch 1 Patch Transdermal daily  metFORMIN Extended-Release 500 milliGRAM(s) Oral daily  senna 2 Tablet(s) Oral at bedtime    MEDICATIONS  (PRN):  acetaminophen     Tablet .. 650 milliGRAM(s) Oral every 6 hours PRN Temp greater or equal to 38C (100.4F), Mild Pain (1 - 3)  cyclobenzaprine 5 milliGRAM(s) Oral three times a day PRN Muscle Spasm  dextrose Oral Gel 15 Gram(s) Oral once PRN Blood Glucose LESS THAN 70 milliGRAM(s)/deciliter  melatonin 3 milliGRAM(s) Oral at bedtime PRN Insomnia  traMADol 25 milliGRAM(s) Oral every 6 hours PRN Severe Pain (7 - 10)      Vital Signs Last 24 Hrs  T(C): 36.6 (26 Oct 2023 05:18), Max: 37 (25 Oct 2023 21:35)  T(F): 97.8 (26 Oct 2023 05:18), Max: 98.6 (25 Oct 2023 21:35)  HR: 90 (26 Oct 2023 05:18) (90 - 90)  BP: 122/65 (26 Oct 2023 05:18) (122/65 - 136/75)  BP(mean): --  RR: 18 (26 Oct 2023 05:18) (18 - 18)  SpO2: 100% (26 Oct 2023 05:18) (96% - 100%)    Parameters below as of 26 Oct 2023 05:18  Patient On (Oxygen Delivery Method): room air      CAPILLARY BLOOD GLUCOSE      POCT Blood Glucose.: 150 mg/dL (26 Oct 2023 11:59)  POCT Blood Glucose.: 146 mg/dL (26 Oct 2023 08:32)  POCT Blood Glucose.: 143 mg/dL (25 Oct 2023 22:59)  POCT Blood Glucose.: 215 mg/dL (25 Oct 2023 17:42)    I&O's Summary      PHYSICAL EXAM:  CONSTITUTIONAL: NAD, well-developed, well-groomed  RESPIRATORY: Normal respiratory effort; lungs are clear to auscultation bilaterally  CARDIOVASCULAR: Regular rate and rhythm, normal S1 and S2, no murmur/rub/gallop  ABDOMEN: Soft, Nontender to palpation, nondistended  MUSCULOSKELETAL: moves all extremities, no LE edema  NEURO: AA  answers questions appropriately      LABS:                    RADIOLOGY & ADDITIONAL TESTS:    Imaging Personally Reviewed:    Consultant(s) Notes Reviewed:      Care Discussed with Consultants/Other Providers:

## 2023-10-26 NOTE — PROGRESS NOTE ADULT - PROBLEM SELECTOR PLAN 1
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  - Ophthalmology eval appreciated, c/f patient's left tractional RD progressing to involve fovea leading to worsening vision along with vit hemorrhage  - no acute ophthalmological intervention currently overnight -- recommending outpt retina specialist f/u with possible surgical evaluation   - admitted in setting of social situation with undomiciled state and now near-blindness  - f/u ophtho recs
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  - Ophthalmology eval appreciated, c/f patient's left tractional RD progressing to involve fovea leading to worsening vision along with vit hemorrhage  - no acute ophthalmological intervention currently overnight -- recommending outpt retina specialist f/u with possible surgical evaluation   - admitted in setting of social situation with undomiciled state and now near-blindness  - f/u ophtho recs
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  - Ophthalmology eval appreciated, c/f patient's left tractional RD progressing to involve fovea leading to worsening vision along with vit hemorrhage  - no acute ophthalmological intervention-- recommending outpt retina specialist f/u with possible surgical evaluation   - admitted in setting of social situation with un-domiciled state and now near-blindness
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  - Ophthalmology eval appreciated, c/f patient's left tractional RD progressing to involve fovea leading to worsening vision along with vit hemorrhage  - no acute ophthalmological intervention currently overnight -- recommending outpt retina specialist f/u with possible surgical evaluation   - admitted in setting of social situation with undomiciled state and now near-blindness  - f/u PT, OT  - f/u ophtho recs
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  - Ophthalmology eval appreciated, c/f patient's left tractional RD progressing to involve fovea leading to worsening vision along with vit hemorrhage  - no acute ophthalmological intervention currently overnight -- recommending outpt retina specialist f/u with possible surgical evaluation   - admitted in setting of social situation with undomiciled state and now near-blindness  - f/u ophtho recs
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  - Ophthalmology eval appreciated, c/f patient's left tractional RD progressing to involve fovea leading to worsening vision along with vit hemorrhage  - no acute ophthalmological intervention-- recommending outpt retina specialist f/u with possible surgical evaluation   - admitted in setting of social situation with un-domiciled state and now near-blindness  -f/u ophthal recs  -outpt f/u with opthal
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  - Ophthalmology eval appreciated, c/f patient's left tractional RD progressing to involve fovea leading to worsening vision along with vit hemorrhage  - no acute ophthalmological intervention-- recommending outpt retina specialist f/u with possible surgical evaluation   - admitted in setting of social situation with undomiciled state and now near-blindness
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  - Ophthalmology eval appreciated, c/f patient's left tractional RD progressing to involve fovea leading to worsening vision along with vit hemorrhage  - no acute ophthalmological intervention-- recommending outpt retina specialist f/u with possible surgical evaluation   - admitted in setting of social situation with un-domiciled state and now near-blindness  -f/u ophthal recs
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  - Ophthalmology eval appreciated, c/f patient's left tractional RD progressing to involve fovea leading to worsening vision along with vit hemorrhage  - no acute ophthalmological intervention-- recommending outpt retina specialist f/u with possible surgical evaluation   - admitted in setting of social situation with undomiciled state and now near-blindness
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  - Ophthalmology eval appreciated, c/f patient's left tractional RD progressing to involve fovea leading to worsening vision along with vit hemorrhage  - no acute ophthalmological intervention-- recommending outpt retina specialist f/u with possible surgical evaluation   - admitted in setting of social situation with un-domiciled state and now near-blindness  -f/u ophthal recs
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  - Ophthalmology eval appreciated, c/f patient's left tractional RD progressing to involve fovea leading to worsening vision along with vit hemorrhage  - no acute ophthalmological intervention-- recommending outpt retina specialist f/u with possible surgical evaluation   - admitted in setting of social situation with undomiciled state and now near-blindness
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  - Ophthalmology eval appreciated, c/f patient's left tractional RD progressing to involve fovea leading to worsening vision along with vit hemorrhage  - no acute ophthalmological intervention-- recommending outpt retina specialist f/u with possible surgical evaluation   - admitted in setting of social situation with undomiciled state and now near-blindness  - f/u ophtho recs
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  - Ophthalmology eval appreciated, c/f patient's left tractional RD progressing to involve fovea leading to worsening vision along with vit hemorrhage  - no acute ophthalmological intervention-- recommending outpt retina specialist f/u with possible surgical evaluation   - admitted in setting of social situation with undomiciled state and now near-blindness
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  - Ophthalmology eval appreciated, c/f patient's left tractional RD progressing to involve fovea leading to worsening vision along with vit hemorrhage  - no acute ophthalmological intervention currently overnight -- recommending outpt retina specialist f/u with possible surgical evaluation   - admitted in setting of social situation with undomiciled state and now near-blindness  - f/u ophtho recs
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  - Ophthalmology eval appreciated, c/f patient's left tractional RD progressing to involve fovea leading to worsening vision along with vit hemorrhage  - no acute ophthalmological intervention-- recommending outpt retina specialist f/u with possible surgical evaluation   - admitted in setting of social situation with undomiciled state and now near-blindness
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  - Ophthalmology eval appreciated, c/f patient's left tractional RD progressing to involve fovea leading to worsening vision along with vit hemorrhage  - no acute ophthalmological intervention-- recommending outpt retina specialist f/u with possible surgical evaluation   - admitted in setting of social situation with un-domiciled state and now near-blindness  -f/u ophthal recs  -outpt f/u with opthal
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  - Ophthalmology eval appreciated, c/f patient's left tractional RD progressing to involve fovea leading to worsening vision along with vit hemorrhage  - no acute ophthalmological intervention currently overnight -- recommending outpt retina specialist f/u with possible surgical evaluation   - admitted in setting of social situation with undomiciled state and now near-blindness  - f/u PT, OT  - f/u ophtho recs
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  - Ophthalmology eval appreciated, c/f patient's left tractional RD progressing to involve fovea leading to worsening vision along with vit hemorrhage  - no acute ophthalmological intervention currently overnight -- recommending outpt retina specialist f/u with possible surgical evaluation   - admitted in setting of social situation with undomiciled state and now near-blindness  - f/u ophtho recs
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  - Ophthalmology eval appreciated, c/f patient's left tractional RD progressing to involve fovea leading to worsening vision along with vit hemorrhage  - no acute ophthalmological intervention-- recommending outpt retina specialist f/u with possible surgical evaluation   - admitted in setting of social situation with undomiciled state and now near-blindness
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  - Ophthalmology eval appreciated, c/f patient's left tractional RD progressing to involve fovea leading to worsening vision along with vit hemorrhage  - no acute ophthalmological intervention-- recommending outpt retina specialist f/u with possible surgical evaluation   - admitted in setting of social situation with undomiciled state and now near-blindness
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  - Ophthalmology eval appreciated, c/f patient's left tractional RD progressing to involve fovea leading to worsening vision along with vit hemorrhage  - no acute ophthalmological intervention currently overnight -- recommending outpt retina specialist f/u with possible surgical evaluation   - admitted in setting of social situation with undomiciled state and now near-blindness  - f/u ophtho recs
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  - Ophthalmology eval appreciated, c/f patient's left tractional RD progressing to involve fovea leading to worsening vision along with vit hemorrhage  - no acute ophthalmological intervention currently overnight -- recommending outpt retina specialist f/u with possible surgical evaluation   - admitted in setting of social situation with undomiciled state and now near-blindness  - f/u ophtho recs
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  - Ophthalmology eval appreciated, c/f patient's left tractional RD progressing to involve fovea leading to worsening vision along with vit hemorrhage  - no acute ophthalmological intervention-- recommending outpt retina specialist f/u with possible surgical evaluation   - admitted in setting of social situation with undomiciled state and now near-blindness

## 2023-10-26 NOTE — PROGRESS NOTE ADULT - PROBLEM SELECTOR PROBLEM 4
Hyperlipidemia

## 2023-10-26 NOTE — PROGRESS NOTE ADULT - PROBLEM SELECTOR PROBLEM 2
Type 2 diabetes mellitus
Hyperlipidemia
Hyperlipidemia
Type 2 diabetes mellitus
Type 2 diabetes mellitus
Hyperlipidemia
Hyperlipidemia
Type 2 diabetes mellitus

## 2023-10-26 NOTE — PROGRESS NOTE ADULT - PROBLEM SELECTOR PROBLEM 6
Left shoulder pain
Left shoulder pain
Tension headache
Left shoulder pain
Tension headache
Left shoulder pain
Left shoulder pain
Tension headache

## 2023-10-26 NOTE — PROGRESS NOTE ADULT - PROBLEM SELECTOR PLAN 4
- as above, would prefer to be on high-dose statin but pt unamenable currently
- as above, would recommend to be on high-dose statin but pt not amenable currently    #Constipation  - changed miralax--> lactulose; changed senna--> dulcolax
- as above, would recommend to be on high-dose statin but pt not amenable currently
- as above, would recommend to be on high-dose statin but pt not amenable currently
- as above, would recommend to be on high-dose statin but pt unamenable currently
- as above, would prefer to be on high-dose statin but pt unamenable currently
- as above, would recommend to be on high-dose statin but pt not amenable currently
- as above, would prefer to be on high-dose statin but pt unamenable currently
- as above, would recommend to be on high-dose statin but pt not amenable currently
- as above, would recommend to be on high-dose statin but pt unamenable currently
- as above, would recommend to be on high-dose statin but pt not amenable currently
- as above, would recommend to be on high-dose statin but pt not amenable currently
- as above, would prefer to be on high-dose statin but pt unamenable currently
- as above, would recommend to be on high-dose statin but pt not amenable currently
- as above, would prefer to be on high-dose statin but pt unamenable currently
- as above, would prefer to be on high-dose statin but pt unamenable currently
- as above, would recommend to be on high-dose statin but pt not amenable currently
- as above, would recommend to be on high-dose statin but pt not amenable currently
- as above, would prefer to be on high-dose statin but pt unamenable currently
- as above, would recommend to be on high-dose statin but pt not amenable currently

## 2023-10-26 NOTE — PROGRESS NOTE ADULT - PROBLEM SELECTOR PLAN 7
DVT ppx: Lovenox  Diet: DASH/TLC, Consistent Carb    DC planning complex due to new blindness; SW/CM and hospital admin aware
DVT ppx: Lovenox  Diet: DASH/TLC, Consistent Carb    DC planning complex due to new blindness; SW/CM and hospital admin aware
DVT ppx: Lovenox  Diet: DASH/TLC, Consistent Carb    DC planning complex due to new blindness; SW/CM and hospital admin aware  f/u today's labs if done      Plan discussed with ACP
DVT ppx: Lovenox  Diet: DASH/TLC, Consistent Carb    DC planning complex due to new blindness; SW/CM and hospital admin aware
associated with HA  ophtho to re-eval, no further interventions; recommend artificial tears
DVT ppx: Lovenox  Diet: DASH/TLC, Consistent Carb    DC planning complex due to new blindness; SW/CM and hospital admin aware
DVT ppx: Lovenox  Diet: DASH/TLC, Consistent Carb    DC planning complex due to new blindness; SW/CM and hospital admin aware  dc today, dc planning time spent in coordination 38mts ( preparing dc summary and plan)      Plan discussed with ACP
DVT ppx: Lovenox  Diet: DASH/TLC, Consistent Carb    DC planning complex due to new blindness; SW/CM and hospital admin aware
DVT ppx: Lovenox  Diet: DASH/TLC, Consistent Carb    DC planning complex due to new blindness; SW/CM and hospital admin aware    f/u labs  Plan discussed with ACP
DVT ppx: Lovenox  Diet: DASH/TLC, Consistent Carb    DC planning complex due to new blindness; SW/CM and hospital admin aware      Plan discussed with ACP
associated with HA  ophtho to re-eval
DVT ppx: Lovenox  Diet: DASH/TLC, Consistent Carb    DC planning complex due to new blindness; SW/CM and hospital admin aware

## 2023-10-26 NOTE — PROGRESS NOTE ADULT - PROBLEM SELECTOR PLAN 6
- now improved with pain medication, started on tramadol   CT neg  tylenol helps will continue   does not appear to have temporal tenderness  mildly elevated ESR is non specific, CRP neg, low suspicion for TA
patient w/ left shoulder pain this AM, w/ associated left paraspinal neck pain. Likely muscular in origin. ROM intact on exam with pain with abduction and flexion.     - cyclobenzaprine 5mg ordered prn  - lidocaine patch ordered   - monitor for improvement of symptoms
- now improved with pain medication, started on tramadol   CT neg  tylenol helps will continue   does not appear to have temporal tenderness  mildly elevated ESR is non specific, CRP neg, low suspicion for TA
patient w/ left shoulder pain this AM, w/ associated left paraspinal neck pain. Likely muscular in origin. ROM intact on exam with pain with abduction and flexion.     - cyclobenzaprine 5mg ordered prn  - lidocaine patch ordered   - monitor for improvement of symptoms
- now improved with pain medication, started on tramadol   CT neg  tylenol helps will continue   does not appear to have temporal tenderness  mildly elevated ESR is non specific, CRP neg, low suspicion for TA
new HA  CT neg  tylenol helps will continue   does not appear to have temporal tenderness  mildly elevated ESR is non specific, CRP neg, low suspicion for TA
- now improved with pain medication, started on tramadol   CT neg  tylenol helps will continue   does not appear to have temporal tenderness  mildly elevated ESR is non specific, CRP neg, low suspicion for TA
patient w/ left shoulder pain this AM, w/ associated left paraspinal neck pain. Likely muscular in origin. ROM intact on exam with pain with abduction and flexion.     - cyclobenzaprine 5mg ordered prn  - lidocaine patch ordered   - monitor for improvement of symptoms
- now improved with pain medication, started on tramadol   CT neg  tylenol helps will continue   does not appear to have temporal tenderness  mildly elevated ESR is non specific, CRP neg, low suspicion for TA
new HA  CT neg  tylenol helps  does not appear to have temporal tenderness    mildly elevated ESR is non specific, CRP neg, low suspicion for TA
patient w/ left shoulder pain this AM, w/ associated left paraspinal neck pain. Likely muscular in origin. ROM intact on exam with pain with abduction and flexion.     - cyclobenzaprine 5mg ordered prn  - lidocaine patch ordered   - monitor for improvement of symptoms
patient w/ left shoulder pain this AM, w/ associated left paraspinal neck pain. Likely muscular in origin. ROM intact on exam with pain with abduction and flexion.     - cyclobenzaprine 5mg ordered prn  - lidocaine patch ordered   - monitor for improvement of symptoms

## 2023-10-26 NOTE — PROGRESS NOTE ADULT - PROBLEM SELECTOR PROBLEM 5
Constipation
Eye pain, right
Need for prophylactic measure
Eye pain, right
Need for prophylactic measure
Need for prophylactic measure
Constipation
Need for prophylactic measure
Need for prophylactic measure
Constipation
Need for prophylactic measure
Eye pain, right
Need for prophylactic measure
Constipation

## 2023-10-26 NOTE — PROGRESS NOTE ADULT - PROBLEM SELECTOR PROBLEM 7
Need for prophylactic measure
Eye pain, right
Need for prophylactic measure
Eye pain, right
Need for prophylactic measure

## 2023-10-26 NOTE — PROGRESS NOTE ADULT - PROVIDER SPECIALTY LIST ADULT
Endocrinology
Hospitalist
Ophthalmology
Ophthalmology
Hospitalist
Hospitalist
Endocrinology
Hospitalist
Hospitalist
Endocrinology
Endocrinology
Hospitalist
Internal Medicine
Hospitalist
Internal Medicine
Hospitalist
Hospitalist

## 2023-10-26 NOTE — PROGRESS NOTE ADULT - PROBLEM SELECTOR PROBLEM 3
CVA (cerebrovascular accident)
Screening for hypertension
CVA (cerebrovascular accident)
Screening for hypertension
Screening for hypertension
CVA (cerebrovascular accident)
CVA (cerebrovascular accident)
Screening for hypertension
CVA (cerebrovascular accident)

## 2023-10-26 NOTE — PROGRESS NOTE ADULT - PROBLEM SELECTOR PROBLEM 1
Type 2 diabetes mellitus
Vision loss of left eye
Vision loss of left eye
Type 2 diabetes mellitus
Vision loss of left eye
Type 2 diabetes mellitus
Vision loss of left eye
Vision loss of left eye
Type 2 diabetes mellitus
Vision loss of left eye

## 2023-10-26 NOTE — PROGRESS NOTE ADULT - REASON FOR ADMISSION
L eye vision loss

## 2023-10-27 ENCOUNTER — TRANSCRIPTION ENCOUNTER (OUTPATIENT)
Age: 53
End: 2023-10-27

## 2023-10-27 VITALS
DIASTOLIC BLOOD PRESSURE: 70 MMHG | TEMPERATURE: 97 F | HEART RATE: 98 BPM | RESPIRATION RATE: 16 BRPM | SYSTOLIC BLOOD PRESSURE: 121 MMHG | HEIGHT: 62 IN | OXYGEN SATURATION: 100 %

## 2023-10-27 VITALS
TEMPERATURE: 98 F | SYSTOLIC BLOOD PRESSURE: 123 MMHG | OXYGEN SATURATION: 100 % | RESPIRATION RATE: 17 BRPM | HEART RATE: 93 BPM | DIASTOLIC BLOOD PRESSURE: 77 MMHG

## 2023-10-27 DIAGNOSIS — M79.602 PAIN IN LEFT ARM: ICD-10-CM

## 2023-10-27 DIAGNOSIS — Z29.9 ENCOUNTER FOR PROPHYLACTIC MEASURES, UNSPECIFIED: ICD-10-CM

## 2023-10-27 DIAGNOSIS — Z86.73 PERSONAL HISTORY OF TRANSIENT ISCHEMIC ATTACK (TIA), AND CEREBRAL INFARCTION WITHOUT RESIDUAL DEFICITS: ICD-10-CM

## 2023-10-27 DIAGNOSIS — E11.9 TYPE 2 DIABETES MELLITUS WITHOUT COMPLICATIONS: ICD-10-CM

## 2023-10-27 DIAGNOSIS — Z86.69 PERSONAL HISTORY OF OTHER DISEASES OF THE NERVOUS SYSTEM AND SENSE ORGANS: ICD-10-CM

## 2023-10-27 DIAGNOSIS — E78.5 HYPERLIPIDEMIA, UNSPECIFIED: ICD-10-CM

## 2023-10-27 LAB
ALBUMIN SERPL ELPH-MCNC: 3.8 G/DL — SIGNIFICANT CHANGE UP (ref 3.3–5)
ALBUMIN SERPL ELPH-MCNC: 3.8 G/DL — SIGNIFICANT CHANGE UP (ref 3.3–5)
ALP SERPL-CCNC: 107 U/L — SIGNIFICANT CHANGE UP (ref 40–120)
ALP SERPL-CCNC: 107 U/L — SIGNIFICANT CHANGE UP (ref 40–120)
ALT FLD-CCNC: 18 U/L — SIGNIFICANT CHANGE UP (ref 4–33)
ALT FLD-CCNC: 18 U/L — SIGNIFICANT CHANGE UP (ref 4–33)
ANION GAP SERPL CALC-SCNC: 13 MMOL/L — SIGNIFICANT CHANGE UP (ref 7–14)
ANION GAP SERPL CALC-SCNC: 13 MMOL/L — SIGNIFICANT CHANGE UP (ref 7–14)
AST SERPL-CCNC: 33 U/L — HIGH (ref 4–32)
AST SERPL-CCNC: 33 U/L — HIGH (ref 4–32)
BASOPHILS # BLD AUTO: 0.04 K/UL — SIGNIFICANT CHANGE UP (ref 0–0.2)
BASOPHILS # BLD AUTO: 0.04 K/UL — SIGNIFICANT CHANGE UP (ref 0–0.2)
BASOPHILS NFR BLD AUTO: 0.6 % — SIGNIFICANT CHANGE UP (ref 0–2)
BASOPHILS NFR BLD AUTO: 0.6 % — SIGNIFICANT CHANGE UP (ref 0–2)
BILIRUB SERPL-MCNC: <0.2 MG/DL — SIGNIFICANT CHANGE UP (ref 0.2–1.2)
BILIRUB SERPL-MCNC: <0.2 MG/DL — SIGNIFICANT CHANGE UP (ref 0.2–1.2)
BUN SERPL-MCNC: 23 MG/DL — SIGNIFICANT CHANGE UP (ref 7–23)
BUN SERPL-MCNC: 23 MG/DL — SIGNIFICANT CHANGE UP (ref 7–23)
CALCIUM SERPL-MCNC: 9.2 MG/DL — SIGNIFICANT CHANGE UP (ref 8.4–10.5)
CALCIUM SERPL-MCNC: 9.2 MG/DL — SIGNIFICANT CHANGE UP (ref 8.4–10.5)
CHLORIDE SERPL-SCNC: 101 MMOL/L — SIGNIFICANT CHANGE UP (ref 98–107)
CHLORIDE SERPL-SCNC: 101 MMOL/L — SIGNIFICANT CHANGE UP (ref 98–107)
CO2 SERPL-SCNC: 20 MMOL/L — LOW (ref 22–31)
CO2 SERPL-SCNC: 20 MMOL/L — LOW (ref 22–31)
CREAT SERPL-MCNC: 0.98 MG/DL — SIGNIFICANT CHANGE UP (ref 0.5–1.3)
CREAT SERPL-MCNC: 0.98 MG/DL — SIGNIFICANT CHANGE UP (ref 0.5–1.3)
EGFR: 69 ML/MIN/1.73M2 — SIGNIFICANT CHANGE UP
EGFR: 69 ML/MIN/1.73M2 — SIGNIFICANT CHANGE UP
EOSINOPHIL # BLD AUTO: 0.2 K/UL — SIGNIFICANT CHANGE UP (ref 0–0.5)
EOSINOPHIL # BLD AUTO: 0.2 K/UL — SIGNIFICANT CHANGE UP (ref 0–0.5)
EOSINOPHIL NFR BLD AUTO: 3 % — SIGNIFICANT CHANGE UP (ref 0–6)
EOSINOPHIL NFR BLD AUTO: 3 % — SIGNIFICANT CHANGE UP (ref 0–6)
GLUCOSE BLDC GLUCOMTR-MCNC: 130 MG/DL — HIGH (ref 70–99)
GLUCOSE BLDC GLUCOMTR-MCNC: 130 MG/DL — HIGH (ref 70–99)
GLUCOSE BLDC GLUCOMTR-MCNC: 134 MG/DL — HIGH (ref 70–99)
GLUCOSE BLDC GLUCOMTR-MCNC: 134 MG/DL — HIGH (ref 70–99)
GLUCOSE BLDC GLUCOMTR-MCNC: 139 MG/DL — HIGH (ref 70–99)
GLUCOSE BLDC GLUCOMTR-MCNC: 139 MG/DL — HIGH (ref 70–99)
GLUCOSE BLDC GLUCOMTR-MCNC: 188 MG/DL — HIGH (ref 70–99)
GLUCOSE BLDC GLUCOMTR-MCNC: 188 MG/DL — HIGH (ref 70–99)
GLUCOSE SERPL-MCNC: 129 MG/DL — HIGH (ref 70–99)
GLUCOSE SERPL-MCNC: 129 MG/DL — HIGH (ref 70–99)
HCT VFR BLD CALC: 34.2 % — LOW (ref 34.5–45)
HCT VFR BLD CALC: 34.2 % — LOW (ref 34.5–45)
HGB BLD-MCNC: 10.9 G/DL — LOW (ref 11.5–15.5)
HGB BLD-MCNC: 10.9 G/DL — LOW (ref 11.5–15.5)
IANC: 3.47 K/UL — SIGNIFICANT CHANGE UP (ref 1.8–7.4)
IANC: 3.47 K/UL — SIGNIFICANT CHANGE UP (ref 1.8–7.4)
IMM GRANULOCYTES NFR BLD AUTO: 0.1 % — SIGNIFICANT CHANGE UP (ref 0–0.9)
IMM GRANULOCYTES NFR BLD AUTO: 0.1 % — SIGNIFICANT CHANGE UP (ref 0–0.9)
LYMPHOCYTES # BLD AUTO: 2.55 K/UL — SIGNIFICANT CHANGE UP (ref 1–3.3)
LYMPHOCYTES # BLD AUTO: 2.55 K/UL — SIGNIFICANT CHANGE UP (ref 1–3.3)
LYMPHOCYTES # BLD AUTO: 37.9 % — SIGNIFICANT CHANGE UP (ref 13–44)
LYMPHOCYTES # BLD AUTO: 37.9 % — SIGNIFICANT CHANGE UP (ref 13–44)
MCHC RBC-ENTMCNC: 26.9 PG — LOW (ref 27–34)
MCHC RBC-ENTMCNC: 26.9 PG — LOW (ref 27–34)
MCHC RBC-ENTMCNC: 31.9 GM/DL — LOW (ref 32–36)
MCHC RBC-ENTMCNC: 31.9 GM/DL — LOW (ref 32–36)
MCV RBC AUTO: 84.4 FL — SIGNIFICANT CHANGE UP (ref 80–100)
MCV RBC AUTO: 84.4 FL — SIGNIFICANT CHANGE UP (ref 80–100)
MONOCYTES # BLD AUTO: 0.45 K/UL — SIGNIFICANT CHANGE UP (ref 0–0.9)
MONOCYTES # BLD AUTO: 0.45 K/UL — SIGNIFICANT CHANGE UP (ref 0–0.9)
MONOCYTES NFR BLD AUTO: 6.7 % — SIGNIFICANT CHANGE UP (ref 2–14)
MONOCYTES NFR BLD AUTO: 6.7 % — SIGNIFICANT CHANGE UP (ref 2–14)
NEUTROPHILS # BLD AUTO: 3.47 K/UL — SIGNIFICANT CHANGE UP (ref 1.8–7.4)
NEUTROPHILS # BLD AUTO: 3.47 K/UL — SIGNIFICANT CHANGE UP (ref 1.8–7.4)
NEUTROPHILS NFR BLD AUTO: 51.7 % — SIGNIFICANT CHANGE UP (ref 43–77)
NEUTROPHILS NFR BLD AUTO: 51.7 % — SIGNIFICANT CHANGE UP (ref 43–77)
NRBC # BLD: 0 /100 WBCS — SIGNIFICANT CHANGE UP (ref 0–0)
NRBC # BLD: 0 /100 WBCS — SIGNIFICANT CHANGE UP (ref 0–0)
NRBC # FLD: 0 K/UL — SIGNIFICANT CHANGE UP (ref 0–0)
NRBC # FLD: 0 K/UL — SIGNIFICANT CHANGE UP (ref 0–0)
PLATELET # BLD AUTO: 203 K/UL — SIGNIFICANT CHANGE UP (ref 150–400)
PLATELET # BLD AUTO: 203 K/UL — SIGNIFICANT CHANGE UP (ref 150–400)
POTASSIUM SERPL-MCNC: 5.2 MMOL/L — SIGNIFICANT CHANGE UP (ref 3.5–5.3)
POTASSIUM SERPL-MCNC: 5.2 MMOL/L — SIGNIFICANT CHANGE UP (ref 3.5–5.3)
POTASSIUM SERPL-SCNC: 5.2 MMOL/L — SIGNIFICANT CHANGE UP (ref 3.5–5.3)
POTASSIUM SERPL-SCNC: 5.2 MMOL/L — SIGNIFICANT CHANGE UP (ref 3.5–5.3)
PROT SERPL-MCNC: 7 G/DL — SIGNIFICANT CHANGE UP (ref 6–8.3)
PROT SERPL-MCNC: 7 G/DL — SIGNIFICANT CHANGE UP (ref 6–8.3)
RBC # BLD: 4.05 M/UL — SIGNIFICANT CHANGE UP (ref 3.8–5.2)
RBC # BLD: 4.05 M/UL — SIGNIFICANT CHANGE UP (ref 3.8–5.2)
RBC # FLD: 13.8 % — SIGNIFICANT CHANGE UP (ref 10.3–14.5)
RBC # FLD: 13.8 % — SIGNIFICANT CHANGE UP (ref 10.3–14.5)
SODIUM SERPL-SCNC: 134 MMOL/L — LOW (ref 135–145)
SODIUM SERPL-SCNC: 134 MMOL/L — LOW (ref 135–145)
WBC # BLD: 6.72 K/UL — SIGNIFICANT CHANGE UP (ref 3.8–10.5)
WBC # BLD: 6.72 K/UL — SIGNIFICANT CHANGE UP (ref 3.8–10.5)
WBC # FLD AUTO: 6.72 K/UL — SIGNIFICANT CHANGE UP (ref 3.8–10.5)
WBC # FLD AUTO: 6.72 K/UL — SIGNIFICANT CHANGE UP (ref 3.8–10.5)

## 2023-10-27 PROCEDURE — 99236 HOSP IP/OBS SAME DATE HI 85: CPT

## 2023-10-27 RX ORDER — METFORMIN HYDROCHLORIDE 850 MG/1
1000 TABLET ORAL DAILY
Refills: 0 | Status: DISCONTINUED | OUTPATIENT
Start: 2023-10-27 | End: 2023-10-27

## 2023-10-27 RX ORDER — DEXTROSE 50 % IN WATER 50 %
12.5 SYRINGE (ML) INTRAVENOUS ONCE
Refills: 0 | Status: DISCONTINUED | OUTPATIENT
Start: 2023-10-27 | End: 2023-10-27

## 2023-10-27 RX ORDER — INSULIN LISPRO 100/ML
VIAL (ML) SUBCUTANEOUS AT BEDTIME
Refills: 0 | Status: DISCONTINUED | OUTPATIENT
Start: 2023-10-27 | End: 2023-10-27

## 2023-10-27 RX ORDER — DEXTROSE 50 % IN WATER 50 %
15 SYRINGE (ML) INTRAVENOUS ONCE
Refills: 0 | Status: DISCONTINUED | OUTPATIENT
Start: 2023-10-27 | End: 2023-10-27

## 2023-10-27 RX ORDER — DEXTROSE 50 % IN WATER 50 %
25 SYRINGE (ML) INTRAVENOUS ONCE
Refills: 0 | Status: DISCONTINUED | OUTPATIENT
Start: 2023-10-27 | End: 2023-10-27

## 2023-10-27 RX ORDER — SODIUM CHLORIDE 9 MG/ML
1000 INJECTION, SOLUTION INTRAVENOUS
Refills: 0 | Status: DISCONTINUED | OUTPATIENT
Start: 2023-10-27 | End: 2023-10-27

## 2023-10-27 RX ORDER — INSULIN GLARGINE 100 [IU]/ML
12 INJECTION, SOLUTION SUBCUTANEOUS AT BEDTIME
Refills: 0 | Status: DISCONTINUED | OUTPATIENT
Start: 2023-10-27 | End: 2023-10-27

## 2023-10-27 RX ORDER — ENOXAPARIN SODIUM 100 MG/ML
40 INJECTION SUBCUTANEOUS EVERY 24 HOURS
Refills: 0 | Status: DISCONTINUED | OUTPATIENT
Start: 2023-10-27 | End: 2023-10-27

## 2023-10-27 RX ORDER — METFORMIN HYDROCHLORIDE 850 MG/1
1 TABLET ORAL
Qty: 30 | Refills: 1
Start: 2023-10-27

## 2023-10-27 RX ORDER — IBUPROFEN 200 MG
400 TABLET ORAL ONCE
Refills: 0 | Status: COMPLETED | OUTPATIENT
Start: 2023-10-27 | End: 2023-10-27

## 2023-10-27 RX ORDER — GLUCAGON INJECTION, SOLUTION 0.5 MG/.1ML
1 INJECTION, SOLUTION SUBCUTANEOUS ONCE
Refills: 0 | Status: DISCONTINUED | OUTPATIENT
Start: 2023-10-27 | End: 2023-10-27

## 2023-10-27 RX ORDER — INSULIN LISPRO 100/ML
VIAL (ML) SUBCUTANEOUS
Refills: 0 | Status: DISCONTINUED | OUTPATIENT
Start: 2023-10-27 | End: 2023-10-27

## 2023-10-27 RX ORDER — ASPIRIN/CALCIUM CARB/MAGNESIUM 324 MG
81 TABLET ORAL DAILY
Refills: 0 | Status: DISCONTINUED | OUTPATIENT
Start: 2023-10-27 | End: 2023-10-27

## 2023-10-27 RX ORDER — ACETAMINOPHEN 500 MG
650 TABLET ORAL EVERY 6 HOURS
Refills: 0 | Status: DISCONTINUED | OUTPATIENT
Start: 2023-10-27 | End: 2023-10-27

## 2023-10-27 RX ORDER — ACETAMINOPHEN 500 MG
2 TABLET ORAL
Qty: 0 | Refills: 0 | DISCHARGE
Start: 2023-10-27

## 2023-10-27 RX ADMIN — Medication 400 MILLIGRAM(S): at 01:56

## 2023-10-27 RX ADMIN — Medication 1: at 12:56

## 2023-10-27 NOTE — CHART NOTE - NSCHARTNOTEFT_GEN_A_CORE
Patient was discharge from this facility to [facility] on 10/26 and returned the ED same day with complaints of not liking the facility. Per sign-out from overnight , one of patient's concerns is that she was in a room with 3 other persons.  contacted a hospital liaison from [facility], Harsha ([phone]), and asked for assistance with speaking to admissions at [place] [facility] to determine if they will accept patient back to the facility and assist with getting a 2 person room if patient is in agreement to return.      received call back from Harsha stating the facility will accept patient back, and he will follow-up to discuss availability of 2 bed rooms if patient agrees to return.      met with patient at bedside to discuss placement.  inquired if patient is open to returning to the facility if a 2 bed room is available and she responded "no".  Patient reported the facility was dark and the building is old. She also voiced that she does not want to go to another facility because she would not be able to "come and go as (she) pleases" and "it's like being in a long term."     Patient reported she wants to be discharged back to the shelter system.  informed patient that, per her previous shelter, she's not able to return because she requires more assistance 2/2 to her impaired vision that cannot be met by the shelter. Patient reported she wants to be discharged so she can go to central intake who will find another shelter for her. She was advised that's not a safe discharge plan. Patient was becoming increasingly agitated so  informed her social work will follow up.    discussed case with , Ángela, who will also follow up with patient. Social work remains available.
Patient presents from [facility]. LCSW sent referral to facility, [facility] confirms that they are able to accept patient back to their facility today.      and Dr. Cardenas met with patient to explore discharge options.  informed patient that [facility] is able to accept her back. Patient is adamant that she will not return to [facility].  explored if patient is interested in going to another facility, patient persistent that she does not want to go to a facility.  discussed other options. Patient reports that she is open to going to a shelter. Per previous social work note from last admissions, patient’s shelter unable to accept patient back. LCSW made patient aware of such. Patient reports that she can stay with her mother, Michelle post hospitalization. Patient initially very guarded to provide any contact information for her mother, however, ultimately states that her mother can be reached at [phone] and her address is [address] [place] [address].  attempted to contact patient’s mother 3x. Call goes to voicemail; LCSW left voicemail with contact information requesting a return call. Patient continues to report that she does not want to remain in the hospital and would like to be discharged as soon as possible. Patient states that she does not want to wait for medical team to speak to her mother to verify discharge plans. Patient states that she is not interested in getting any additional services or support in the community.     Patient requesting taxi ride to her mother’s residence. [facility] booking# [id number].

## 2023-10-27 NOTE — PROVIDER CONTACT NOTE (OTHER) - BACKGROUND
pt admitted L arm pain
hallways. low ceilings, dark, and doesn't know how anyone could live there. Pt encouraged to return as d.c was at night possible different during day, but pt continues on about 4 beds to room,

## 2023-10-27 NOTE — H&P ADULT - ASSESSMENT
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 SW arranged transportation home for 6pm. Patient does have capacity at this time.

## 2023-10-27 NOTE — DISCHARGE NOTE NURSING/CASE MANAGEMENT/SOCIAL WORK - PATIENT PORTAL LINK FT
You can access the FollowMyHealth Patient Portal offered by [facility] by registering at the following website: [email or web address]. By joining [facility]’s FollowMyHealth portal, you will also be able to view your health information using other applications (apps) compatible with our system.

## 2023-10-27 NOTE — H&P ADULT - NSHPREVIEWOFSYSTEMS_GEN_ALL_CORE
ANTICOAGULATION  MANAGEMENT PROGRAM    Obi Messer is overdue for INR check.     Left message to call and schedule INR appointment as soon as possible.      Christelle Huerta RN     REVIEW OF SYSTEMS:    CONSTITUTIONAL: As HPI  EYES/ENT: As HPI  NECK: No pain or stiffness  RESPIRATORY: No cough, wheezing, hemoptysis; No shortness of breath  CARDIOVASCULAR: No chest pain or palpitations  GASTROINTESTINAL: No abdominal or epigastric pain. No nausea, vomiting, or hematemesis; No diarrhea or constipation. No melena or hematochezia.  GENITOURINARY: No dysuria, frequency or hematuria  NEUROLOGICAL: No numbness or weakness  SKIN: No itching, rashes

## 2023-10-27 NOTE — PROVIDER CONTACT NOTE (OTHER) - ASSESSMENT
discrimination, tax dollars/insurance paying for this, and that she will not live there. Pt reports she rather return to shelter. Pt becomes further irritable reporting having 50 dollars and that she will just leave. Case to be further discussed with team. MD aware.
pt a+o x4. no IV meds ordered at this time.

## 2023-10-27 NOTE — ED ADULT NURSE NOTE - OBJECTIVE STATEMENT
Pt from , was DC from [facility] today. C/o left hand pain. Also states does not like roommate and nursing home. PMHx DM2, HLD

## 2023-10-27 NOTE — DISCHARGE NOTE PROVIDER - REASON FOR ADMISSION
Discharged to [facility] yesterday from [facility] and was not happy with the place

## 2023-10-27 NOTE — DISCHARGE NOTE PROVIDER - CARE PROVIDER_API CALL
Followup with PCP,   Phone: (   )    -  Fax: (   )    -  Follow Up Time:    Kate Tate  Internal Medicine  [address] [place] [address]  Phone: [phone]  Fax: [phone]  Established Patient  Follow Up Time: 1 week

## 2023-10-27 NOTE — ED ADULT NURSE NOTE - CHIEF COMPLAINT QUOTE
Pt from [facility], was DC from [facility] today. C/o left hand pain. Also states does not like roommate and nursing home. PMHx DM2, HLD

## 2023-10-27 NOTE — H&P ADULT - NSHPPHYSICALEXAM_GEN_ALL_CORE
Vital Signs Last 24 Hrs  T(C): 36.9 (27 Oct 2023 09:30), Max: 36.9 (27 Oct 2023 09:30)  T(F): 98.5 (27 Oct 2023 09:30), Max: 98.5 (27 Oct 2023 09:30)  HR: 93 (27 Oct 2023 09:30) (89 - 98)  BP: 123/77 (27 Oct 2023 09:30) (121/70 - 127/64)  BP(mean): --  RR: 17 (27 Oct 2023 09:30) (16 - 17)  SpO2: 100% (27 Oct 2023 09:30) (99% - 100%)    Parameters below as of 27 Oct 2023 09:30  Patient On (Oxygen Delivery Method): room air      PHYSICAL EXAM:  GENERAL: NAD, well-groomed, well-developed  HEAD:  Atraumatic, Normocephalic  ENMT: No tonsillar erythema, exudates, or enlargement; Moist mucous membranes, Good dentition, No lesions  NECK: Supple, No JVD, Normal thyroid  NERVOUS SYSTEM:  Alert & Oriented X3, Good concentration; Motor Strength 5/5 B/L upper and lower extremities  CHEST/LUNG: Clear to percussion bilaterally; No rales, rhonchi, wheezing, or rubs  HEART: Regular rate and rhythm; No murmurs, rubs, or gallops  ABDOMEN: Soft, Nontender, Nondistended; Bowel sounds present  EXTREMITIES:  2+ Peripheral Pulses, No clubbing, cyanosis, or edema  LYMPH: No lymphadenopathy noted  SKIN: No rashes or lesions

## 2023-10-27 NOTE — PROVIDER CONTACT NOTE (OTHER) - SITUATION
Pt arrives from [facility] following d/c from 10/26 PM.  Pt is visually impaired. Pt with complaints against discharge placement. Pt reports that the facility was like a [facility], narrow
pt has no IV access. RN attempted 3x, unsuccessful.

## 2023-10-27 NOTE — H&P ADULT - REASON FOR ADMISSION
Discharged to [facility] yesterday from [facility] and was not happy with the place

## 2023-10-27 NOTE — DISCHARGE NOTE NURSING/CASE MANAGEMENT/SOCIAL WORK - NSDCPEFALRISK_GEN_ALL_CORE
For information on Fall & Injury Prevention, visit: [email or web address] OR  [email or web address] OR  https://www.cdc.gov/steadi/patient.html

## 2023-10-27 NOTE — H&P ADULT - HISTORY OF PRESENT ILLNESS
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 Patient denies any new symptoms. While in the ER patient decided that she does not want to go to any facilities and that she rather go back to the shelter where she came from, however per SW notes that is not a safe plan for her because she requires more assistance 2/2 to her impaired vision that cannot be met by the shelter, so patient now wants to go to her mother's place.  Upon interview patient feels well and just wants to be discharged, didn't really want to talk to me and said that she doesn't need anything from me/us and just wants to be discharged, although she is waiting for her mother to come and pick her up.      VSS in ER  Received ibuprofen 400mg

## 2023-10-27 NOTE — H&P ADULT - PROBLEM SELECTOR PLAN 3
History of old/chronic infarcts likely from small vessel disease seen on imaging during 2022  - c/w ASA 81mg daily  - Should ideally be on high dose statin but patient is not amenable to it at this time  - Compliance encouraged with meds and follow up's

## 2023-10-27 NOTE — H&P ADULT - PROBLEM SELECTOR PLAN 1
History of tractional retinal detachment of right eye leading to R eye blindness  - Recent admission with sudden acute painless vision loss and blurry vision of left eye  - Seen by Ophthalmology on recent admission, c/f patient's left tractional RD progressing to involve fovea leading to worsening vision along with vit hemorrhage  - Recommended outpt retina specialist f/u with possible surgical evaluation  - Compliance encouraged with meds and follow up's

## 2023-10-27 NOTE — H&P ADULT - PROBLEM SELECTOR PLAN 2
History of IDDM with recent insurance switch causing issues regarding obtaining medications. Also with new acute blindness, patient is not comfortable injecting insulin  - A1c 8.5  - Was on lantus 14U qhs on recent admission, c/w lantus but a slightly lower dose of 12U qhs  - Will be discharged on metformin 1000 mg daily and Jardiance  - Compliance encouraged with meds and follow up's

## 2023-10-27 NOTE — H&P ADULT - PROBLEM SELECTOR PLAN 4
- Should ideally be on high dose statin but patient is not amenable to it at this time  - Compliance encouraged with meds and follow up's

## 2023-10-27 NOTE — ED ADULT NURSE NOTE - NSFALLUNIVINTERV_ED_ALL_ED
Bed/Stretcher in lowest position, wheels locked, appropriate side rails in place/Call bell, personal items and telephone in reach/Instruct patient to call for assistance before getting out of bed/chair/stretcher/Non-slip footwear applied when patient is off stretcher/[place] to call system/Physically safe environment - no spills, clutter or unnecessary equipment/Purposeful proactive rounding/Room/bathroom lighting operational, light cord in reach

## 2023-10-27 NOTE — ED PROVIDER NOTE - ATTENDING CONTRIBUTION TO CARE
HPI: 52 year-old female with history of insulin-dependent diabetes, HLD, and history of R-sided tractional retinal detachment (leading to R eye blindness in 2022), and CVA (2022) presents now with sudden acute painless left-sided vision loss that was discharged 4 hours prior to arrival to a [facility] after a lengthy stay in [facility] for sudden vision loss that now returns because she did not like her care facility. Per pt, prior to discharge to the SNF, she called to check on the room that she was to be in, when she arrived she did not like the room that she was placed in, did not like her roommates, states it was a room full of cots to sleep in so requested to come back to the hospital for another placement. Prior to this pt was in shelter as she is undomiciled. Pt also complaints of left arm pain (per triage note states hand pain) but pt states she thinks she pulled a muscle and pinching a nerve as her arm is painful from shoulder radiating down to her forearm, throbbing, no chest pain, no SOB. No trauma or rashes reported. May have bumped her arm without knowing as she cannot see well secondary to vision loss. Still able to use her arm normally. Has not taken meds for this. no fever, chills, N/V.     EXAM: Well appearing, speaking full sentences, left arm with FROM at shoulder/elbow/wrist/fingers, neurovasc intact,  strength 5/5. Cap refill < 2 sec.     MDM: pt with multiple medical problems that presents with complaints that she was not happy with her placement and came back to ED for re-placement. also complains of left arm pain. Benign exam, has a small area of ecchymosis on left forearm, may have bumped her arm per pt report. FROM and neuro intact. Will provide IBU and call SW for possible replacement or pt may require to be readmitted for placement. No clinical indication for imaging at this time. May need basic labs if pt is readmitted.

## 2023-10-27 NOTE — H&P ADULT - PROBLEM/PLAN-4
[address] [place] [address] PROGRESS NOTE    Name:  Shan Sethi  MR#:  [id number]  :  1941  ACCOUNT #:  [de-identified]  DATE OF SERVICE:  2019      TREATING PHYSICIAN:  Jun Nino DPM    SUBJECTIVE:  The patient presents today for followup to a left third toe ulceration. Denies any nausea, vomiting, fevers, night sweats, or chills. He states he is doing his wound care as instructed. Denies any pain in the wound site, but states the tip of his toe hurts over the nail. OBJECTIVE:  VITAL SIGNS:  Stable. The patient is afebrile. WOUND EXAMINATION:  Left Foot Exam:  There is a 0.9 x 0.6 x 0.2 cm wound along the dorsal aspect of the left third toe. The left third toenail is very thick and elongated. DP and PT pulses are nonpalpable. Protective sensation is absent. ASSESSMENT:  1. Left third toe ulcer. 2.  Diabetes with peripheral neuropathy. 3.  Onychomycosis. PLAN:  1. At today's visit, I had a long discussion with the patient. Clinically, his wound appears to be dry and stable. There is no sign of infection. We will continue Betadine and dry dressing every day. 2.  At today's visit, the left third toenail was debrided with relief. 3.  The patient is to follow up in two weeks.         KINA Charlton/THOM_I/  D:  2019 15:10  T:  12/10/2019 5:52  JOB #:  [id number] DISPLAY PLAN FREE TEXT

## 2023-10-27 NOTE — ED PROVIDER NOTE - PROGRESS NOTE DETAILS
DUSTIN: Sw was unable to convince pt to go back to [facility], I was also unable to convince pt to go back to [facility], even asking if pt would like to try to go back int he morning to see if the place was improved as she was there at night and her complaints of it being too dark may be resolved by then and may be related to the night time arrival. Pt continues to refuse and is becoming agitated with our recommendations. At this time, we will have to admit pt back to the hospital for placement.

## 2023-10-27 NOTE — H&P ADULT - NSHPLABSRESULTS_GEN_ALL_CORE
.  LABS:                         10.9   6.72  )-----------( 203      ( 27 Oct 2023 04:35 )             34.2     10-27    134<L>  |  101  |  23  ----------------------------<  129<H>  5.2   |  20<L>  |  0.98    Ca    9.2      27 Oct 2023 04:35    TPro  7.0  /  Alb  3.8  /  TBili  <0.2  /  DBili  x   /  AST  33<H>  /  ALT  18  /  AlkPhos  107  10-27      Urinalysis Basic - ( 27 Oct 2023 04:35 )    Color: x / Appearance: x / SG: x / pH: x  Gluc: 129 mg/dL / Ketone: x  / Bili: x / Urobili: x   Blood: x / Protein: x / Nitrite: x   Leuk Esterase: x / RBC: x / WBC x   Sq Epi: x / Non Sq Epi: x / Bacteria: x            RADIOLOGY, EKG & ADDITIONAL TESTS: Reviewed.

## 2023-10-27 NOTE — ED ADULT TRIAGE NOTE - CHIEF COMPLAINT QUOTE
Pt from [facility], was DC from [facility] today. C/o left hand pain. Also states does not like roommate and nursing home. PMHx DM2, HLD

## 2023-10-27 NOTE — DISCHARGE NOTE PROVIDER - NSDCCPCAREPLAN_GEN_ALL_CORE_FT
PRINCIPAL DISCHARGE DIAGNOSIS  Diagnosis: Vision loss  Assessment and Plan of Treatment: You were recently admitted to [facility] for vision loss of left eye, seen by Ophthalmology and they felt that this is secondary to your history of prior retinal detachment of R eye that is progressing to fovea is leading to worsening vision along with vit hemorrhage. You were discharged to Rehab and didn't like it there so returned to ER and now have opted to be discharged home instead of going to another facility. Please follow up with Ophthalmology as soon as possible, information is below.      SECONDARY DISCHARGE DIAGNOSES  Diagnosis: Diabetes mellitus  Assessment and Plan of Treatment: Please take metformin and jardiance as prescribed. Please check your fingersticks regularly and please follow up with your PMD in 1-2 weeks as outpatient.

## 2023-10-27 NOTE — ED PROVIDER NOTE - OBJECTIVE STATEMENT
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  States that there are only a couple rooms, and that she had 3 other people in her room.  Also notes that the lights were very low which made it extremely difficult for her to see anything given her vision impairment.    She denies any new symptoms at this time.  Though does note some left arm pain going from her shoulder into her hand, as well as a small amount of numbness on the left lateral aspect of her pinky finger patient states this been going on for a year since her stroke.  Denies any new change in symptoms.  Denies any other issues since leaving the hospital.

## 2023-10-27 NOTE — ED PROVIDER NOTE - CLINICAL SUMMARY MEDICAL DECISION MAKING FREE TEXT BOX
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  Patient states that the state of the facility was unacceptable for multiple reasons, and that she would prefer to come back to the hospital for placement at a new place.  In triage note notes some left hand pain, the patient states that this has been chronic and ongoing ever since the stroke that she had in 2022.  Denies any recent changes.  At this time patient has no other complaints or new symptoms or changes since being discharged from the hospital several hours ago, will treat left hand pain with ibuprofen, and engage social work to see if it is possible to find a facility for patient to discharge to, though may require admission for placement.

## 2023-10-27 NOTE — DISCHARGE NOTE PROVIDER - NSDCMRMEDTOKEN_GEN_ALL_CORE_FT
acetaminophen 325 mg oral tablet: 2 tab(s) orally every 6 hours As needed Temp greater or equal to 38C (100.4F), Mild Pain (1 - 3)  aspirin 81 mg oral tablet, chewable: 1 tab(s) orally once a day  cyclobenzaprine 5 mg oral tablet: 1 tab(s) orally 3 times a day As needed Muscle Spasm  Jardiance 10 mg oral tablet: 1 tab(s) orally  lactobacillus acidophilus oral capsule: 1 tab(s) orally once a day  lactulose 10 g/15 mL oral syrup: 15 milliliter(s) orally 2 times a day  lidocaine 4% topical film: Apply topically to affected area every 24 hours Keep applied for 12 hours and removed for 12 hours before next application  metFORMIN 1000 mg oral tablet: 1 tab(s) orally once a day  ocular lubricant ophthalmic solution: 2 drop(s) to each affected eye 4 times a day  senna leaf extract oral tablet: 2 tab(s) orally once a day (at bedtime)  traMADol 50 mg oral tablet: 0.5 tab(s) orally every 6 hours As needed Severe Pain (7 - 10)

## 2023-10-27 NOTE — DISCHARGE NOTE PROVIDER - HOSPITAL COURSE
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 Patient denies any new symptoms. While in the ER patient decided that she does not want to go to any facilities and that she rather go back to the shelter where she came from, however per SW notes that is not a safe plan for her because she requires more assistance 2/2 to her impaired vision that cannot be met by the shelter, so patient now wants to go to her mother's place and  has set up transportation home. Compliance encouraged with meds and follow up's. Medically stable for discharge home, patient does have capacity.   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 Patient denies any new symptoms. While in the ER patient decided that she does not want to go to any facilities and that she rather go back to the shelter where she came from, however per SW notes that is not a safe plan for her because she requires more assistance 2/2 to her impaired vision that cannot be met by the shelter, so patient now wants to go to her mother's place and  has set up transportation home. Compliance encouraged with meds and follow up's. Medically stable for discharge home, patient does have capacity.

## 2023-10-27 NOTE — ED PROVIDER NOTE - PHYSICAL EXAMINATION
Not sure ,previous sig said . 8 tablets as well. Constitutional: Well nourished, well developed, appears stated age.   Eyes: PERRL, EOMI. No scleral icterus  HENT: Moist mucous membranes. No posterior oropharyngeal erythema.   Neck: Supple. No goiter. No C-spine TTP. No meningismus  CV: RRR, no m/r/g. Well perfused extremities.   RESP: Lungs CTAB, normal respiratory effort  GI: Soft, non-tender, no massess appreciated  MSK: Moving all four extremities. No obvious deformity  Skin: Warm, dry, no rashes  Neuro: Alert and oriented. CNII-XII grossly intact. Normal strength and sensation of UEs and LEs  Psych: Appropriate mood and affect

## 2023-10-27 NOTE — DISCHARGE NOTE PROVIDER - PROVIDER TOKENS
FREE:[LAST:[Followup with PCP],PHONE:[(   )    -],FAX:[(   )    -]] PROVIDER:[TOKEN:[[id number]],FOLLOWUP:[1 week],ESTABLISHEDPATIENT:[T]]

## 2023-10-28 PROBLEM — H54.40 BLINDNESS, ONE EYE, UNSPECIFIED EYE: Chronic | Status: ACTIVE | Noted: 2023-09-22

## 2023-10-28 PROBLEM — E78.5 HYPERLIPIDEMIA, UNSPECIFIED: Chronic | Status: ACTIVE | Noted: 2023-09-22

## 2023-12-09 NOTE — PROGRESS NOTE ADULT - ASSESSMENT
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

## 2024-07-08 ENCOUNTER — EMERGENCY (EMERGENCY)
Facility: HOSPITAL | Age: 54
LOS: 1 days | Discharge: HOME | End: 2024-07-10
Attending: STUDENT IN AN ORGANIZED HEALTH CARE EDUCATION/TRAINING PROGRAM | Admitting: STUDENT IN AN ORGANIZED HEALTH CARE EDUCATION/TRAINING PROGRAM
Payer: MEDICAID

## 2024-07-08 VITALS
HEART RATE: 94 BPM | HEIGHT: 62 IN | WEIGHT: 132.28 LBS | DIASTOLIC BLOOD PRESSURE: 82 MMHG | OXYGEN SATURATION: 96 % | TEMPERATURE: 99 F | RESPIRATION RATE: 18 BRPM | SYSTOLIC BLOOD PRESSURE: 131 MMHG

## 2024-07-08 DIAGNOSIS — E78.5 HYPERLIPIDEMIA, UNSPECIFIED: ICD-10-CM

## 2024-07-08 DIAGNOSIS — R20.0 ANESTHESIA OF SKIN: ICD-10-CM

## 2024-07-08 DIAGNOSIS — Z79.84 LONG TERM (CURRENT) USE OF ORAL HYPOGLYCEMIC DRUGS: ICD-10-CM

## 2024-07-08 DIAGNOSIS — E11.9 TYPE 2 DIABETES MELLITUS WITHOUT COMPLICATIONS: ICD-10-CM

## 2024-07-08 DIAGNOSIS — I63.9 CEREBRAL INFARCTION, UNSPECIFIED: ICD-10-CM

## 2024-07-08 DIAGNOSIS — E78.00 PURE HYPERCHOLESTEROLEMIA, UNSPECIFIED: ICD-10-CM

## 2024-07-08 DIAGNOSIS — I10 ESSENTIAL (PRIMARY) HYPERTENSION: ICD-10-CM

## 2024-07-08 DIAGNOSIS — H54.61 UNQUALIFIED VISUAL LOSS, RIGHT EYE, NORMAL VISION LEFT EYE: ICD-10-CM

## 2024-07-08 DIAGNOSIS — Z86.73 PERSONAL HISTORY OF TRANSIENT ISCHEMIC ATTACK (TIA), AND CEREBRAL INFARCTION WITHOUT RESIDUAL DEFICITS: ICD-10-CM

## 2024-07-08 DIAGNOSIS — Z29.9 ENCOUNTER FOR PROPHYLACTIC MEASURES, UNSPECIFIED: ICD-10-CM

## 2024-07-08 DIAGNOSIS — R53.1 WEAKNESS: ICD-10-CM

## 2024-07-08 DIAGNOSIS — H54.7 UNSPECIFIED VISUAL LOSS: ICD-10-CM

## 2024-07-08 LAB
ALBUMIN SERPL ELPH-MCNC: 3.6 G/DL — SIGNIFICANT CHANGE UP (ref 3.3–5)
ALP SERPL-CCNC: 111 U/L — SIGNIFICANT CHANGE UP (ref 40–120)
ALT FLD-CCNC: 19 U/L — SIGNIFICANT CHANGE UP (ref 12–78)
ANION GAP SERPL CALC-SCNC: 6 MMOL/L — SIGNIFICANT CHANGE UP (ref 5–17)
APTT BLD: 31.5 SEC — SIGNIFICANT CHANGE UP (ref 24.5–35.6)
AST SERPL-CCNC: 9 U/L — LOW (ref 15–37)
BASOPHILS # BLD AUTO: 0.03 K/UL — SIGNIFICANT CHANGE UP (ref 0–0.2)
BASOPHILS NFR BLD AUTO: 0.5 % — SIGNIFICANT CHANGE UP (ref 0–2)
BILIRUB SERPL-MCNC: 0.2 MG/DL — SIGNIFICANT CHANGE UP (ref 0.2–1.2)
BUN SERPL-MCNC: 19 MG/DL — SIGNIFICANT CHANGE UP (ref 7–23)
CALCIUM SERPL-MCNC: 9.8 MG/DL — SIGNIFICANT CHANGE UP (ref 8.5–10.1)
CHLORIDE SERPL-SCNC: 102 MMOL/L — SIGNIFICANT CHANGE UP (ref 96–108)
CO2 SERPL-SCNC: 29 MMOL/L — SIGNIFICANT CHANGE UP (ref 22–31)
CREAT SERPL-MCNC: 1.08 MG/DL — SIGNIFICANT CHANGE UP (ref 0.5–1.3)
EGFR: 61 ML/MIN/1.73M2 — SIGNIFICANT CHANGE UP
EOSINOPHIL # BLD AUTO: 0.15 K/UL — SIGNIFICANT CHANGE UP (ref 0–0.5)
EOSINOPHIL NFR BLD AUTO: 2.5 % — SIGNIFICANT CHANGE UP (ref 0–6)
GLUCOSE BLDC GLUCOMTR-MCNC: 179 MG/DL — HIGH (ref 70–99)
GLUCOSE BLDC GLUCOMTR-MCNC: 242 MG/DL — HIGH (ref 70–99)
GLUCOSE SERPL-MCNC: 243 MG/DL — HIGH (ref 70–99)
HCT VFR BLD CALC: 35.5 % — SIGNIFICANT CHANGE UP (ref 34.5–45)
HGB BLD-MCNC: 11.6 G/DL — SIGNIFICANT CHANGE UP (ref 11.5–15.5)
IMM GRANULOCYTES NFR BLD AUTO: 0.2 % — SIGNIFICANT CHANGE UP (ref 0–0.9)
INR BLD: 0.87 RATIO — SIGNIFICANT CHANGE UP (ref 0.85–1.18)
LYMPHOCYTES # BLD AUTO: 2.21 K/UL — SIGNIFICANT CHANGE UP (ref 1–3.3)
LYMPHOCYTES # BLD AUTO: 37.5 % — SIGNIFICANT CHANGE UP (ref 13–44)
MCHC RBC-ENTMCNC: 27.4 PG — SIGNIFICANT CHANGE UP (ref 27–34)
MCHC RBC-ENTMCNC: 32.7 G/DL — SIGNIFICANT CHANGE UP (ref 32–36)
MCV RBC AUTO: 83.7 FL — SIGNIFICANT CHANGE UP (ref 80–100)
MONOCYTES # BLD AUTO: 0.43 K/UL — SIGNIFICANT CHANGE UP (ref 0–0.9)
MONOCYTES NFR BLD AUTO: 7.3 % — SIGNIFICANT CHANGE UP (ref 2–14)
NEUTROPHILS # BLD AUTO: 3.07 K/UL — SIGNIFICANT CHANGE UP (ref 1.8–7.4)
NEUTROPHILS NFR BLD AUTO: 52 % — SIGNIFICANT CHANGE UP (ref 43–77)
NRBC # BLD: 0 /100 WBCS — SIGNIFICANT CHANGE UP (ref 0–0)
PLATELET # BLD AUTO: 207 K/UL — SIGNIFICANT CHANGE UP (ref 150–400)
POTASSIUM SERPL-MCNC: 4.1 MMOL/L — SIGNIFICANT CHANGE UP (ref 3.5–5.3)
POTASSIUM SERPL-SCNC: 4.1 MMOL/L — SIGNIFICANT CHANGE UP (ref 3.5–5.3)
PROT SERPL-MCNC: 7.8 GM/DL — SIGNIFICANT CHANGE UP (ref 6–8.3)
PROTHROM AB SERPL-ACNC: 10.4 SEC — SIGNIFICANT CHANGE UP (ref 9.5–13)
RBC # BLD: 4.24 M/UL — SIGNIFICANT CHANGE UP (ref 3.8–5.2)
RBC # FLD: 13.2 % — SIGNIFICANT CHANGE UP (ref 10.3–14.5)
SODIUM SERPL-SCNC: 137 MMOL/L — SIGNIFICANT CHANGE UP (ref 135–145)
TROPONIN I, HIGH SENSITIVITY RESULT: 3 NG/L — SIGNIFICANT CHANGE UP
WBC # BLD: 5.9 K/UL — SIGNIFICANT CHANGE UP (ref 3.8–10.5)
WBC # FLD AUTO: 5.9 K/UL — SIGNIFICANT CHANGE UP (ref 3.8–10.5)

## 2024-07-08 PROCEDURE — 70450 CT HEAD/BRAIN W/O DYE: CPT | Mod: 26,MC,59

## 2024-07-08 PROCEDURE — 0042T: CPT | Mod: MC

## 2024-07-08 PROCEDURE — 70496 CT ANGIOGRAPHY HEAD: CPT | Mod: 26,MC

## 2024-07-08 PROCEDURE — 70498 CT ANGIOGRAPHY NECK: CPT | Mod: 26,MC

## 2024-07-08 PROCEDURE — 99223 1ST HOSP IP/OBS HIGH 75: CPT

## 2024-07-08 PROCEDURE — 93010 ELECTROCARDIOGRAM REPORT: CPT

## 2024-07-08 PROCEDURE — 99235 HOSP IP/OBS SAME DATE MOD 70: CPT

## 2024-07-08 PROCEDURE — 99285 EMERGENCY DEPT VISIT HI MDM: CPT

## 2024-07-08 RX ORDER — DEXTROSE 30 % IN WATER 30 %
25 VIAL (ML) INTRAVENOUS ONCE
Refills: 0 | Status: DISCONTINUED | OUTPATIENT
Start: 2024-07-08 | End: 2024-07-10

## 2024-07-08 RX ORDER — ASPIRIN 325 MG/1
81 TABLET, FILM COATED ORAL DAILY
Refills: 0 | Status: DISCONTINUED | OUTPATIENT
Start: 2024-07-08 | End: 2024-07-10

## 2024-07-08 RX ORDER — DEXTROSE 30 % IN WATER 30 %
15 VIAL (ML) INTRAVENOUS ONCE
Refills: 0 | Status: DISCONTINUED | OUTPATIENT
Start: 2024-07-08 | End: 2024-07-10

## 2024-07-08 RX ORDER — SODIUM CHLORIDE 0.9 % (FLUSH) 0.9 %
1000 SYRINGE (ML) INJECTION
Refills: 0 | Status: DISCONTINUED | OUTPATIENT
Start: 2024-07-08 | End: 2024-07-10

## 2024-07-08 RX ORDER — ATORVASTATIN CALCIUM 20 MG/1
80 TABLET, FILM COATED ORAL AT BEDTIME
Refills: 0 | Status: DISCONTINUED | OUTPATIENT
Start: 2024-07-08 | End: 2024-07-10

## 2024-07-08 RX ORDER — DEXTROSE 30 % IN WATER 30 %
12.5 VIAL (ML) INTRAVENOUS ONCE
Refills: 0 | Status: DISCONTINUED | OUTPATIENT
Start: 2024-07-08 | End: 2024-07-10

## 2024-07-08 RX ORDER — PANTOPRAZOLE SODIUM 40 MG/10ML
40 INJECTION, POWDER, FOR SOLUTION INTRAVENOUS
Refills: 0 | Status: DISCONTINUED | OUTPATIENT
Start: 2024-07-08 | End: 2024-07-10

## 2024-07-08 RX ORDER — DEXTROSE MONOHYDRATE AND SODIUM CHLORIDE 5; .3 G/100ML; G/100ML
1000 INJECTION, SOLUTION INTRAVENOUS
Refills: 0 | Status: DISCONTINUED | OUTPATIENT
Start: 2024-07-08 | End: 2024-07-10

## 2024-07-08 RX ORDER — INSULIN LISPRO 100 [IU]/ML
INJECTION, SOLUTION SUBCUTANEOUS AT BEDTIME
Refills: 0 | Status: DISCONTINUED | OUTPATIENT
Start: 2024-07-08 | End: 2024-07-10

## 2024-07-08 RX ORDER — HEPARIN SODIUM 50 [USP'U]/ML
5000 INJECTION, SOLUTION INTRAVENOUS EVERY 12 HOURS
Refills: 0 | Status: DISCONTINUED | OUTPATIENT
Start: 2024-07-08 | End: 2024-07-10

## 2024-07-08 RX ORDER — INSULIN LISPRO 100 [IU]/ML
INJECTION, SOLUTION SUBCUTANEOUS
Refills: 0 | Status: DISCONTINUED | OUTPATIENT
Start: 2024-07-08 | End: 2024-07-10

## 2024-07-08 RX ORDER — DEXTROSE MONOHYDRATE 100 MG/ML
125 INJECTION, SOLUTION INTRAVENOUS ONCE
Refills: 0 | Status: DISCONTINUED | OUTPATIENT
Start: 2024-07-08 | End: 2024-07-10

## 2024-07-08 RX ORDER — GLUCAGON HYDROCHLORIDE 1 MG/ML
1 INJECTION, POWDER, FOR SOLUTION INTRAMUSCULAR; INTRAVENOUS; SUBCUTANEOUS ONCE
Refills: 0 | Status: DISCONTINUED | OUTPATIENT
Start: 2024-07-08 | End: 2024-07-10

## 2024-07-08 RX ADMIN — Medication 100 MILLILITER(S): at 19:34

## 2024-07-09 ENCOUNTER — RESULT REVIEW (OUTPATIENT)
Age: 54
End: 2024-07-09

## 2024-07-09 LAB
A1C WITH ESTIMATED AVERAGE GLUCOSE RESULT: 8.4 % — HIGH (ref 4–5.6)
A1C WITH ESTIMATED AVERAGE GLUCOSE RESULT: 8.4 % — HIGH (ref 4–5.6)
ALBUMIN SERPL ELPH-MCNC: 3.3 G/DL — SIGNIFICANT CHANGE UP (ref 3.3–5)
ALP SERPL-CCNC: 93 U/L — SIGNIFICANT CHANGE UP (ref 40–120)
ALT FLD-CCNC: 16 U/L — SIGNIFICANT CHANGE UP (ref 12–78)
ANION GAP SERPL CALC-SCNC: 7 MMOL/L — SIGNIFICANT CHANGE UP (ref 5–17)
AST SERPL-CCNC: 7 U/L — LOW (ref 15–37)
BILIRUB DIRECT SERPL-MCNC: <0.05 — SIGNIFICANT CHANGE UP (ref 0–0.3)
BILIRUB INDIRECT FLD-MCNC: 0.2 MG/DL — SIGNIFICANT CHANGE UP (ref 0.2–1)
BILIRUB SERPL-MCNC: 0.2 MG/DL — SIGNIFICANT CHANGE UP (ref 0.2–1.2)
BUN SERPL-MCNC: 14 MG/DL — SIGNIFICANT CHANGE UP (ref 7–23)
CALCIUM SERPL-MCNC: 9.2 MG/DL — SIGNIFICANT CHANGE UP (ref 8.5–10.1)
CHLORIDE SERPL-SCNC: 105 MMOL/L — SIGNIFICANT CHANGE UP (ref 96–108)
CHOLEST SERPL-MCNC: 211 MG/DL — HIGH
CO2 SERPL-SCNC: 26 MMOL/L — SIGNIFICANT CHANGE UP (ref 22–31)
CREAT SERPL-MCNC: 0.85 MG/DL — SIGNIFICANT CHANGE UP (ref 0.5–1.3)
EGFR: 82 ML/MIN/1.73M2 — SIGNIFICANT CHANGE UP
ESTIMATED AVERAGE GLUCOSE: 194 MG/DL — HIGH (ref 68–114)
ESTIMATED AVERAGE GLUCOSE: 194 MG/DL — HIGH (ref 68–114)
GLUCOSE BLDC GLUCOMTR-MCNC: 170 MG/DL — HIGH (ref 70–99)
GLUCOSE BLDC GLUCOMTR-MCNC: 178 MG/DL — HIGH (ref 70–99)
GLUCOSE BLDC GLUCOMTR-MCNC: 201 MG/DL — HIGH (ref 70–99)
GLUCOSE BLDC GLUCOMTR-MCNC: 224 MG/DL — HIGH (ref 70–99)
GLUCOSE BLDC GLUCOMTR-MCNC: 277 MG/DL — HIGH (ref 70–99)
GLUCOSE SERPL-MCNC: 169 MG/DL — HIGH (ref 70–99)
HDLC SERPL-MCNC: 44 MG/DL — LOW
LIPID PNL WITH DIRECT LDL SERPL: 149 MG/DL — HIGH
NON HDL CHOLESTEROL: 167 MG/DL — HIGH
POTASSIUM SERPL-MCNC: 3.9 MMOL/L — SIGNIFICANT CHANGE UP (ref 3.5–5.3)
POTASSIUM SERPL-SCNC: 3.9 MMOL/L — SIGNIFICANT CHANGE UP (ref 3.5–5.3)
PROT SERPL-MCNC: 6.9 GM/DL — SIGNIFICANT CHANGE UP (ref 6–8.3)
SODIUM SERPL-SCNC: 138 MMOL/L — SIGNIFICANT CHANGE UP (ref 135–145)
TRIGL SERPL-MCNC: 97 MG/DL — SIGNIFICANT CHANGE UP

## 2024-07-09 PROCEDURE — 93306 TTE W/DOPPLER COMPLETE: CPT | Mod: 26

## 2024-07-09 PROCEDURE — 93356 MYOCRD STRAIN IMG SPCKL TRCK: CPT

## 2024-07-09 PROCEDURE — 70551 MRI BRAIN STEM W/O DYE: CPT | Mod: 26,MC

## 2024-07-09 RX ADMIN — PANTOPRAZOLE SODIUM 40 MILLIGRAM(S): 40 INJECTION, POWDER, FOR SOLUTION INTRAVENOUS at 05:21

## 2024-07-09 RX ADMIN — Medication 100 MILLILITER(S): at 00:13

## 2024-07-09 RX ADMIN — INSULIN LISPRO 4: 100 INJECTION, SOLUTION SUBCUTANEOUS at 17:39

## 2024-07-09 RX ADMIN — Medication 100 MILLILITER(S): at 05:24

## 2024-07-09 RX ADMIN — INSULIN LISPRO 4: 100 INJECTION, SOLUTION SUBCUTANEOUS at 09:11

## 2024-07-09 RX ADMIN — HEPARIN SODIUM 5000 UNIT(S): 50 INJECTION, SOLUTION INTRAVENOUS at 17:41

## 2024-07-09 RX ADMIN — INSULIN LISPRO 6: 100 INJECTION, SOLUTION SUBCUTANEOUS at 12:36

## 2024-07-09 RX ADMIN — ASPIRIN 81 MILLIGRAM(S): 325 TABLET, FILM COATED ORAL at 12:37

## 2024-07-10 ENCOUNTER — TRANSCRIPTION ENCOUNTER (OUTPATIENT)
Age: 54
End: 2024-07-10

## 2024-07-10 VITALS
SYSTOLIC BLOOD PRESSURE: 131 MMHG | OXYGEN SATURATION: 98 % | TEMPERATURE: 99 F | RESPIRATION RATE: 18 BRPM | HEART RATE: 87 BPM | DIASTOLIC BLOOD PRESSURE: 85 MMHG

## 2024-07-10 LAB
ANION GAP SERPL CALC-SCNC: 6 MMOL/L — SIGNIFICANT CHANGE UP (ref 5–17)
BUN SERPL-MCNC: 15 MG/DL — SIGNIFICANT CHANGE UP (ref 7–23)
CALCIUM SERPL-MCNC: 9.2 MG/DL — SIGNIFICANT CHANGE UP (ref 8.5–10.1)
CHLORIDE SERPL-SCNC: 103 MMOL/L — SIGNIFICANT CHANGE UP (ref 96–108)
CO2 SERPL-SCNC: 28 MMOL/L — SIGNIFICANT CHANGE UP (ref 22–31)
CREAT SERPL-MCNC: 0.82 MG/DL — SIGNIFICANT CHANGE UP (ref 0.5–1.3)
EGFR: 85 ML/MIN/1.73M2 — SIGNIFICANT CHANGE UP
GLUCOSE BLDC GLUCOMTR-MCNC: 176 MG/DL — HIGH (ref 70–99)
GLUCOSE BLDC GLUCOMTR-MCNC: 199 MG/DL — HIGH (ref 70–99)
GLUCOSE BLDC GLUCOMTR-MCNC: 241 MG/DL — HIGH (ref 70–99)
GLUCOSE SERPL-MCNC: 188 MG/DL — HIGH (ref 70–99)
HCT VFR BLD CALC: 35.4 % — SIGNIFICANT CHANGE UP (ref 34.5–45)
HGB BLD-MCNC: 11.6 G/DL — SIGNIFICANT CHANGE UP (ref 11.5–15.5)
MCHC RBC-ENTMCNC: 27.5 PG — SIGNIFICANT CHANGE UP (ref 27–34)
MCHC RBC-ENTMCNC: 32.8 G/DL — SIGNIFICANT CHANGE UP (ref 32–36)
MCV RBC AUTO: 83.9 FL — SIGNIFICANT CHANGE UP (ref 80–100)
NRBC # BLD: 0 /100 WBCS — SIGNIFICANT CHANGE UP (ref 0–0)
PLATELET # BLD AUTO: 194 K/UL — SIGNIFICANT CHANGE UP (ref 150–400)
POTASSIUM SERPL-MCNC: 4.1 MMOL/L — SIGNIFICANT CHANGE UP (ref 3.5–5.3)
POTASSIUM SERPL-SCNC: 4.1 MMOL/L — SIGNIFICANT CHANGE UP (ref 3.5–5.3)
RBC # BLD: 4.22 M/UL — SIGNIFICANT CHANGE UP (ref 3.8–5.2)
RBC # FLD: 13.2 % — SIGNIFICANT CHANGE UP (ref 10.3–14.5)
SODIUM SERPL-SCNC: 137 MMOL/L — SIGNIFICANT CHANGE UP (ref 135–145)
WBC # BLD: 4.69 K/UL — SIGNIFICANT CHANGE UP (ref 3.8–10.5)
WBC # FLD AUTO: 4.69 K/UL — SIGNIFICANT CHANGE UP (ref 3.8–10.5)

## 2024-07-10 PROCEDURE — 99239 HOSP IP/OBS DSCHRG MGMT >30: CPT

## 2024-07-10 RX ORDER — PANTOPRAZOLE SODIUM 40 MG/10ML
1 INJECTION, POWDER, FOR SOLUTION INTRAVENOUS
Qty: 30 | Refills: 0
Start: 2024-07-10 | End: 2024-08-08

## 2024-07-10 RX ORDER — CLOPIDOGREL BISULFATE 75 MG/1
1 TABLET, FILM COATED ORAL
Qty: 21 | Refills: 0
Start: 2024-07-10 | End: 2024-07-30

## 2024-07-10 RX ORDER — EMPAGLIFLOZIN 25 MG/1
1 TABLET, FILM COATED ORAL
Qty: 30 | Refills: 1
Start: 2024-07-10

## 2024-07-10 RX ORDER — CLOPIDOGREL BISULFATE 75 MG/1
75 TABLET, FILM COATED ORAL DAILY
Refills: 0 | Status: DISCONTINUED | OUTPATIENT
Start: 2024-07-10 | End: 2024-07-10

## 2024-07-10 RX ORDER — ATORVASTATIN CALCIUM 20 MG/1
1 TABLET, FILM COATED ORAL
Qty: 30 | Refills: 0
Start: 2024-07-10 | End: 2024-08-08

## 2024-07-10 RX ORDER — ACETAMINOPHEN 325 MG
650 TABLET ORAL ONCE
Refills: 0 | Status: COMPLETED | OUTPATIENT
Start: 2024-07-10 | End: 2024-07-10

## 2024-07-10 RX ORDER — ASPIRIN 325 MG/1
1 TABLET, FILM COATED ORAL
Qty: 0 | Refills: 0 | DISCHARGE
Start: 2024-07-10

## 2024-07-10 RX ADMIN — INSULIN LISPRO 2: 100 INJECTION, SOLUTION SUBCUTANEOUS at 08:57

## 2024-07-10 RX ADMIN — INSULIN LISPRO 4: 100 INJECTION, SOLUTION SUBCUTANEOUS at 11:55

## 2024-07-10 RX ADMIN — ASPIRIN 81 MILLIGRAM(S): 325 TABLET, FILM COATED ORAL at 11:55

## 2024-07-10 RX ADMIN — PANTOPRAZOLE SODIUM 40 MILLIGRAM(S): 40 INJECTION, POWDER, FOR SOLUTION INTRAVENOUS at 06:34

## 2024-07-10 RX ADMIN — Medication 650 MILLIGRAM(S): at 06:33

## 2024-07-10 RX ADMIN — Medication 650 MILLIGRAM(S): at 07:03

## 2024-09-19 NOTE — DIETITIAN INITIAL EVALUATION ADULT - ENTER TO (CAL/KG)
Quality 226: Preventive Care And Screening: Tobacco Use: Screening And Cessation Intervention: Patient screened for tobacco use, is a smoker AND received Cessation Counseling within measurement period or in the six months prior to the measurement period Quality 130: Documentation Of Current Medications In The Medical Record: Current Medications Documented Detail Level: Detailed 30

## 2024-12-18 NOTE — ED ADULT NURSE NOTE - NSFALLRISKASMT_ED_ALL_ED_DT
Pt's spouse is made aware of below information. Verbalized understanding and in agreement with plan.    30-Aug-2023 20:31

## 2025-05-18 NOTE — ED ADULT NURSE NOTE - CADM POA PRESS ULCER
Goal Outcome Evaluation:    PRIMARY Concern: Generalized weakness, positive orthostatics.   SAFETY RISK Concerns (fall risk, behaviors, etc.): Fall.     Aggression Tool Color: Green.   Isolation/Type: None.   Tests/Procedures for NEXT shift: Orthos q 8 hrs  Consults? (Pending/following, signed-off?) PT rec TCU. SW/CC following.  Where is patient from? (Home, TCU, etc.): Home  Other Important info for NEXT shift: recently hospitalized from 5/11-5/13 with pneumonia, on PO antibiotics   Anticipated DC date & active delays: Has been accepted to [facility] for Sunday 5/18/25, time requested for 2pm but the earliest available ride for tomorrow is [id number]  _____________________________________________________________________________  SUMMARY NOTE:   Orientation/Cognitive: A&Ox4. Very pleasant  Observation Goals (Met/ Not Met): INPATIENT  Mobility Level/Assist Equipment: Up Ax1 GB/walker. Up in chair for meals  Antibiotics & Plan (IV/po, length of tx left): Completed 5 days azithromycin. PO Vantin and Valacyclovir   Pain Management: Denies.  Complete Pain Reassessment: Y Due next: Next shift.  Tele/VS/O2: VSS on RA. Orthos   ABNL Lab/BG: Hgb 12.3  Diet: Regular, needs help ordering meals.  Bowel/Bladder: Incontinent of urine and stool. External cath in place.3 BM   Skin Concerns: Blanchable sacrum, mepilex in place CDI. Bruising and scabs to KAVEH shins.   Drains/Devices: PIV SL. Pt had 500 ml LR bolus.   No